# Patient Record
Sex: FEMALE | Race: WHITE | NOT HISPANIC OR LATINO | Employment: FULL TIME | URBAN - METROPOLITAN AREA
[De-identification: names, ages, dates, MRNs, and addresses within clinical notes are randomized per-mention and may not be internally consistent; named-entity substitution may affect disease eponyms.]

---

## 2018-03-19 ENCOUNTER — HOSPITAL ENCOUNTER (INPATIENT)
Facility: HOSPITAL | Age: 28
LOS: 9 days | Discharge: HOME | DRG: 871 | End: 2018-03-28
Attending: EMERGENCY MEDICINE | Admitting: INTERNAL MEDICINE
Payer: COMMERCIAL

## 2018-03-19 ENCOUNTER — APPOINTMENT (EMERGENCY)
Dept: RADIOLOGY | Facility: HOSPITAL | Age: 28
DRG: 871 | End: 2018-03-19
Payer: COMMERCIAL

## 2018-03-19 DIAGNOSIS — E86.0 DEHYDRATION: ICD-10-CM

## 2018-03-19 DIAGNOSIS — N17.9 ACUTE KIDNEY INJURY (HCC): ICD-10-CM

## 2018-03-19 DIAGNOSIS — D50.9 IRON DEFICIENCY ANEMIA, UNSPECIFIED IRON DEFICIENCY ANEMIA TYPE: ICD-10-CM

## 2018-03-19 DIAGNOSIS — J18.9 PNEUMONIA: Primary | ICD-10-CM

## 2018-03-19 DIAGNOSIS — A41.9 SEPSIS (HCC): ICD-10-CM

## 2018-03-19 LAB
ALBUMIN SERPL BCP-MCNC: 2.8 G/DL (ref 3.5–5)
ALP SERPL-CCNC: 60 U/L (ref 46–116)
ALT SERPL W P-5'-P-CCNC: 18 U/L (ref 12–78)
ANION GAP SERPL CALCULATED.3IONS-SCNC: 14 MMOL/L (ref 4–13)
ANISOCYTOSIS BLD QL SMEAR: PRESENT
APTT PPP: 38 SECONDS (ref 23–35)
AST SERPL W P-5'-P-CCNC: 27 U/L (ref 5–45)
BACTERIA UR QL AUTO: ABNORMAL /HPF
BASOPHILS # BLD MANUAL: 0 THOUSAND/UL (ref 0–0.1)
BASOPHILS NFR MAR MANUAL: 0 % (ref 0–1)
BILIRUB SERPL-MCNC: 0.9 MG/DL (ref 0.2–1)
BILIRUB UR QL STRIP: NEGATIVE
BUN SERPL-MCNC: 42 MG/DL (ref 5–25)
CALCIUM SERPL-MCNC: 8.2 MG/DL (ref 8.3–10.1)
CHLORIDE SERPL-SCNC: 98 MMOL/L (ref 100–108)
CLARITY UR: CLEAR
CO2 SERPL-SCNC: 23 MMOL/L (ref 21–32)
COLOR UR: YELLOW
CREAT SERPL-MCNC: 2.02 MG/DL (ref 0.6–1.3)
EOSINOPHIL # BLD MANUAL: 0 THOUSAND/UL (ref 0–0.4)
EOSINOPHIL NFR BLD MANUAL: 0 % (ref 0–6)
ERYTHROCYTE [DISTWIDTH] IN BLOOD BY AUTOMATED COUNT: 15.1 % (ref 11.6–15.1)
EXT PREG TEST URINE: NEGATIVE
GFR SERPL CREATININE-BSD FRML MDRD: 33 ML/MIN/1.73SQ M
GLUCOSE SERPL-MCNC: 125 MG/DL (ref 65–140)
GLUCOSE UR STRIP-MCNC: NEGATIVE MG/DL
HCT VFR BLD AUTO: 30.1 % (ref 34.8–46.1)
HGB BLD-MCNC: 10.3 G/DL (ref 11.5–15.4)
HGB UR QL STRIP.AUTO: ABNORMAL
INR PPP: 1.41 (ref 0.86–1.16)
KETONES UR STRIP-MCNC: NEGATIVE MG/DL
LACTATE SERPL-SCNC: 2 MMOL/L (ref 0.5–2)
LACTATE SERPL-SCNC: 2.8 MMOL/L (ref 0.5–2)
LEUKOCYTE ESTERASE UR QL STRIP: NEGATIVE
LIPASE SERPL-CCNC: 34 U/L (ref 73–393)
LYMPHOCYTES # BLD AUTO: 0.35 THOUSAND/UL (ref 0.6–4.47)
LYMPHOCYTES # BLD AUTO: 3 % (ref 14–44)
MCH RBC QN AUTO: 27 PG (ref 26.8–34.3)
MCHC RBC AUTO-ENTMCNC: 34.2 G/DL (ref 31.4–37.4)
MCV RBC AUTO: 79 FL (ref 82–98)
METAMYELOCYTES NFR BLD MANUAL: 1 % (ref 0–1)
MICROCYTES BLD QL AUTO: PRESENT
MONOCYTES # BLD AUTO: 0.23 THOUSAND/UL (ref 0–1.22)
MONOCYTES NFR BLD: 2 % (ref 4–12)
NEUTROPHILS # BLD MANUAL: 10.87 THOUSAND/UL (ref 1.85–7.62)
NEUTS BAND NFR BLD MANUAL: 22 % (ref 0–8)
NEUTS SEG NFR BLD AUTO: 72 % (ref 43–75)
NITRITE UR QL STRIP: NEGATIVE
NON-SQ EPI CELLS URNS QL MICRO: ABNORMAL /HPF
NRBC BLD AUTO-RTO: 0 /100 WBCS
PH UR STRIP.AUTO: 5.5 [PH] (ref 4.5–8)
PLATELET # BLD AUTO: 183 THOUSANDS/UL (ref 149–390)
PLATELET BLD QL SMEAR: ADEQUATE
PMV BLD AUTO: 11 FL (ref 8.9–12.7)
POTASSIUM SERPL-SCNC: 3.9 MMOL/L (ref 3.5–5.3)
PROT SERPL-MCNC: 6.8 G/DL (ref 6.4–8.2)
PROT UR STRIP-MCNC: ABNORMAL MG/DL
PROTHROMBIN TIME: 17.3 SECONDS (ref 12.1–14.4)
RBC # BLD AUTO: 3.82 MILLION/UL (ref 3.81–5.12)
RBC #/AREA URNS AUTO: ABNORMAL /HPF
SODIUM SERPL-SCNC: 135 MMOL/L (ref 136–145)
SP GR UR STRIP.AUTO: 1.01 (ref 1–1.03)
TOTAL CELLS COUNTED SPEC: 100
UROBILINOGEN UR QL STRIP.AUTO: 0.2 E.U./DL
WBC # BLD AUTO: 11.56 THOUSAND/UL (ref 4.31–10.16)
WBC #/AREA URNS AUTO: ABNORMAL /HPF

## 2018-03-19 PROCEDURE — 99285 EMERGENCY DEPT VISIT HI MDM: CPT

## 2018-03-19 PROCEDURE — 85610 PROTHROMBIN TIME: CPT | Performed by: EMERGENCY MEDICINE

## 2018-03-19 PROCEDURE — 83605 ASSAY OF LACTIC ACID: CPT | Performed by: EMERGENCY MEDICINE

## 2018-03-19 PROCEDURE — 81001 URINALYSIS AUTO W/SCOPE: CPT | Performed by: EMERGENCY MEDICINE

## 2018-03-19 PROCEDURE — 85730 THROMBOPLASTIN TIME PARTIAL: CPT | Performed by: EMERGENCY MEDICINE

## 2018-03-19 PROCEDURE — 80053 COMPREHEN METABOLIC PANEL: CPT | Performed by: EMERGENCY MEDICINE

## 2018-03-19 PROCEDURE — 83690 ASSAY OF LIPASE: CPT | Performed by: EMERGENCY MEDICINE

## 2018-03-19 PROCEDURE — 36415 COLL VENOUS BLD VENIPUNCTURE: CPT | Performed by: EMERGENCY MEDICINE

## 2018-03-19 PROCEDURE — 96375 TX/PRO/DX INJ NEW DRUG ADDON: CPT

## 2018-03-19 PROCEDURE — 87798 DETECT AGENT NOS DNA AMP: CPT | Performed by: EMERGENCY MEDICINE

## 2018-03-19 PROCEDURE — 87040 BLOOD CULTURE FOR BACTERIA: CPT | Performed by: EMERGENCY MEDICINE

## 2018-03-19 PROCEDURE — 96365 THER/PROPH/DIAG IV INF INIT: CPT

## 2018-03-19 PROCEDURE — 85007 BL SMEAR W/DIFF WBC COUNT: CPT | Performed by: EMERGENCY MEDICINE

## 2018-03-19 PROCEDURE — 71046 X-RAY EXAM CHEST 2 VIEWS: CPT

## 2018-03-19 PROCEDURE — 96361 HYDRATE IV INFUSION ADD-ON: CPT

## 2018-03-19 PROCEDURE — 99223 1ST HOSP IP/OBS HIGH 75: CPT | Performed by: PHYSICIAN ASSISTANT

## 2018-03-19 PROCEDURE — 85027 COMPLETE CBC AUTOMATED: CPT | Performed by: EMERGENCY MEDICINE

## 2018-03-19 PROCEDURE — 93005 ELECTROCARDIOGRAM TRACING: CPT

## 2018-03-19 PROCEDURE — 81025 URINE PREGNANCY TEST: CPT | Performed by: EMERGENCY MEDICINE

## 2018-03-19 RX ORDER — ALBUTEROL SULFATE 2.5 MG/3ML
2.5 SOLUTION RESPIRATORY (INHALATION) EVERY 6 HOURS PRN
Status: DISCONTINUED | OUTPATIENT
Start: 2018-03-19 | End: 2018-03-28 | Stop reason: HOSPADM

## 2018-03-19 RX ORDER — ONDANSETRON 2 MG/ML
4 INJECTION INTRAMUSCULAR; INTRAVENOUS ONCE
Status: COMPLETED | OUTPATIENT
Start: 2018-03-19 | End: 2018-03-19

## 2018-03-19 RX ORDER — ALBUTEROL SULFATE 90 UG/1
2 AEROSOL, METERED RESPIRATORY (INHALATION) EVERY 4 HOURS PRN
Status: DISCONTINUED | OUTPATIENT
Start: 2018-03-19 | End: 2018-03-28 | Stop reason: HOSPADM

## 2018-03-19 RX ORDER — ONDANSETRON 2 MG/ML
4 INJECTION INTRAMUSCULAR; INTRAVENOUS EVERY 6 HOURS PRN
Status: DISCONTINUED | OUTPATIENT
Start: 2018-03-19 | End: 2018-03-20

## 2018-03-19 RX ORDER — ACETAMINOPHEN 325 MG/1
650 TABLET ORAL EVERY 6 HOURS PRN
Status: DISCONTINUED | OUTPATIENT
Start: 2018-03-19 | End: 2018-03-28 | Stop reason: HOSPADM

## 2018-03-19 RX ORDER — ACETAMINOPHEN 325 MG/1
650 TABLET ORAL ONCE
Status: COMPLETED | OUTPATIENT
Start: 2018-03-19 | End: 2018-03-19

## 2018-03-19 RX ORDER — SODIUM CHLORIDE 9 MG/ML
75 INJECTION, SOLUTION INTRAVENOUS CONTINUOUS
Status: DISCONTINUED | OUTPATIENT
Start: 2018-03-19 | End: 2018-03-23

## 2018-03-19 RX ORDER — GUAIFENESIN 600 MG
600 TABLET, EXTENDED RELEASE 12 HR ORAL EVERY 12 HOURS SCHEDULED
Status: DISCONTINUED | OUTPATIENT
Start: 2018-03-19 | End: 2018-03-28 | Stop reason: HOSPADM

## 2018-03-19 RX ADMIN — SODIUM CHLORIDE 1000 ML: 0.9 INJECTION, SOLUTION INTRAVENOUS at 20:17

## 2018-03-19 RX ADMIN — ACETAMINOPHEN 650 MG: 325 TABLET, FILM COATED ORAL at 17:23

## 2018-03-19 RX ADMIN — SODIUM CHLORIDE 125 ML/HR: 0.9 INJECTION, SOLUTION INTRAVENOUS at 20:17

## 2018-03-19 RX ADMIN — CEFEPIME HYDROCHLORIDE 2000 MG: 2 INJECTION, POWDER, FOR SOLUTION INTRAVENOUS at 18:02

## 2018-03-19 RX ADMIN — ONDANSETRON 4 MG: 2 INJECTION INTRAMUSCULAR; INTRAVENOUS at 17:09

## 2018-03-19 RX ADMIN — GUAIFENESIN 600 MG: 600 TABLET, EXTENDED RELEASE ORAL at 22:26

## 2018-03-19 RX ADMIN — SODIUM CHLORIDE 200 ML: 0.9 INJECTION, SOLUTION INTRAVENOUS at 18:50

## 2018-03-19 RX ADMIN — SODIUM CHLORIDE 2000 ML: 0.9 INJECTION, SOLUTION INTRAVENOUS at 17:09

## 2018-03-19 NOTE — ED PROVIDER NOTES
History  Chief Complaint   Patient presents with    Vomiting     Patient reports vomting, diarrhea, and fever for two days  reports seen by PCP and reffered to ED   Shortness of Breath     Patient reports shortness of breath and dry cough which began on saturday  HPI    19-year-old female with past medical history of depression presents with chief complaint of shortness of breath which began Saturday  Patient admits to a mildly productive cough yellow sputum  Patient has had accompanying nausea and vomiting  Patient has vomited 3-4 times each day since Sunday  The vomitus is nonbloody nonbilious food like material   Patient admits to fevers at home has not taken temperature she admits to chills rigors  Patient feels unwell  Patient presented to PCPs office and was found to be mildly hypoxic down to 91% and an abnormal lung exam concerning for pneumonia  Patient has been persistent felt extreme fatigue  Patient does not smoke  Patient tried TheraFlu Patient denie but she could not keep this down  Denies prior history of thromboembolism currently is not on birth control  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, working in a office  Patient no recent surgeries  Patient denies neck pain neck stiffness chest pain palpitations hemoptysis pleurisy abdominal pain diarrhea constipation urinary symptoms motor weakness numbness and tingling  None       Past Medical History:   Diagnosis Date    Depressed        Past Surgical History:   Procedure Laterality Date    BREAST LUMPECTOMY Right        History reviewed  No pertinent family history  I have reviewed and agree with the history as documented  Social History   Substance Use Topics    Smoking status: Never Smoker    Smokeless tobacco: Never Used    Alcohol use Yes      Comment: social        Review of Systems   Constitutional: Positive for chills, fatigue and fever   Negative for activity change, appetite change, diaphoresis and unexpected weight change  HENT: Negative for congestion, ear discharge, ear pain, facial swelling, hearing loss, nosebleeds, postnasal drip, rhinorrhea, sinus pressure, sneezing, sore throat and tinnitus  Eyes: Negative for photophobia, pain, redness, itching and visual disturbance  Respiratory: Positive for cough and shortness of breath  Negative for chest tightness, wheezing and stridor  Cardiovascular: Negative for chest pain, palpitations and leg swelling  Gastrointestinal: Positive for diarrhea, nausea and vomiting  Negative for abdominal distention, abdominal pain, anal bleeding, blood in stool and constipation  Endocrine: Negative for polydipsia, polyphagia and polyuria  Genitourinary: Negative for decreased urine volume, difficulty urinating, dysuria, enuresis, flank pain, frequency, hematuria, menstrual problem, urgency, vaginal bleeding, vaginal discharge and vaginal pain  Musculoskeletal: Positive for arthralgias and myalgias  Negative for back pain, gait problem, joint swelling, neck pain and neck stiffness  Skin: Negative for rash and wound  Allergic/Immunologic: Negative for environmental allergies, food allergies and immunocompromised state  Neurological: Negative for dizziness, tremors, seizures, syncope, facial asymmetry, speech difficulty, weakness, light-headedness, numbness and headaches  Hematological: Negative for adenopathy  Psychiatric/Behavioral: Negative for agitation, behavioral problems, confusion, dysphoric mood, hallucinations, self-injury, sleep disturbance and suicidal ideas  The patient is not nervous/anxious and is not hyperactive          Physical Exam  ED Triage Vitals [03/19/18 1613]   Temperature Pulse Respirations Blood Pressure SpO2   99 1 °F (37 3 °C) (!) 132 20 139/57 96 %      Temp Source Heart Rate Source Patient Position - Orthostatic VS BP Location FiO2 (%)   Oral Monitor Sitting Right arm --      Pain Score       7           Orthostatic Vital Signs  Vitals:    03/19/18 2145 03/19/18 2359 03/20/18 0820 03/20/18 1521   BP: 116/79 110/60 100/52    Pulse: (!) 108 (!) 110 (!) 106 (!) 107   Patient Position - Orthostatic VS: Lying Lying Lying Lying       Physical Exam   Constitutional: She is oriented to person, place, and time  She appears well-developed and well-nourished  She appears ill  No distress  HENT:   Head: Normocephalic and atraumatic  Right Ear: External ear normal    Left Ear: External ear normal    Nose: Nose normal    Mouth/Throat: Oropharynx is clear and moist  Mucous membranes are dry  No oropharyngeal exudate  Eyes: Conjunctivae and EOM are normal  Pupils are equal, round, and reactive to light  Right eye exhibits no discharge  Left eye exhibits no discharge  No scleral icterus  Neck: Trachea normal, normal range of motion, full passive range of motion without pain and phonation normal  Neck supple  No JVD present  No spinous process tenderness and no muscular tenderness present  No neck rigidity  No tracheal deviation, no edema, no erythema and normal range of motion present  No thyromegaly present  Cardiovascular: Normal rate, regular rhythm, normal heart sounds and intact distal pulses  No murmur heard  Pulses:       Carotid pulses are 2+ on the right side, and 2+ on the left side  Dorsalis pedis pulses are 2+ on the right side, and 2+ on the left side  Posterior tibial pulses are 2+ on the right side, and 2+ on the left side  Pulmonary/Chest: Effort normal  No stridor  No respiratory distress  She has decreased breath sounds in the right lower field and the left lower field  She has no wheezes  She has no rhonchi  She has no rales  Abdominal: Soft  Bowel sounds are normal  She exhibits no distension and no mass  There is no tenderness  There is no rigidity, no rebound, no guarding, no CVA tenderness, no tenderness at McBurney's point and negative Greer's sign  No hernia     Musculoskeletal: Normal range of motion  She exhibits no edema, tenderness or deformity  Lymphadenopathy:     She has no cervical adenopathy  Neurological: She is alert and oriented to person, place, and time  She displays normal reflexes  No cranial nerve deficit  She exhibits normal muscle tone  Skin: Skin is warm and dry  No rash noted  She is not diaphoretic  No erythema  Psychiatric: She has a normal mood and affect  Her behavior is normal  Judgment and thought content normal    Nursing note and vitals reviewed        ED Medications  Medications   sodium chloride 0 9 % infusion (125 mL/hr Intravenous New Bag 3/20/18 1235)   ondansetron (ZOFRAN) injection 4 mg (4 mg Intravenous Given 3/20/18 1742)   cefepime (MAXIPIME) 2 g/50 mL dextrose IVPB (2,000 mg Intravenous New Bag 3/20/18 1738)   acetaminophen (TYLENOL) tablet 650 mg (650 mg Oral Given 3/20/18 1842)   guaiFENesin (MUCINEX) 12 hr tablet 600 mg (600 mg Oral Given 3/20/18 0949)   albuterol inhalation solution 2 5 mg (2 5 mg Nebulization Given 3/20/18 1640)   albuterol (PROVENTIL HFA,VENTOLIN HFA) inhaler 2 puff (2 puffs Inhalation Given 3/20/18 1738)   ferrous sulfate tablet 325 mg (not administered)   sodium chloride 0 9 % bolus 2,000 mL (0 mL Intravenous Stopped 3/19/18 1850)   acetaminophen (TYLENOL) tablet 650 mg (650 mg Oral Given 3/19/18 1723)   ondansetron (ZOFRAN) injection 4 mg (4 mg Intravenous Given 3/19/18 1709)   cefepime (MAXIPIME) 2 g/50 mL dextrose IVPB (0 mg Intravenous Stopped 3/19/18 1844)   sodium chloride 0 9 % bolus 200 mL (0 mL Intravenous Stopped 3/19/18 1925)   sodium chloride 0 9 % bolus 1,000 mL (0 mL Intravenous Stopped 3/20/18 0726)       Diagnostic Studies  Results Reviewed     Procedure Component Value Units Date/Time    Influenza A/B and RSV by PCR (indicated for patients >2 mo of age) [[id number]]  (Normal) Collected:  03/19/18 1849    Lab Status:  Final result Specimen:  Nasopharyngeal from Nasopharyngeal Swab Updated:  03/20/18 1015     INFLU A PCR None Detected     INFLU B PCR None Detected     RSV PCR None Detected    Basic metabolic panel [[id number]]  (Abnormal) Collected:  03/20/18 0545    Lab Status:  Final result Specimen:  Blood from Arm, Left Updated:  03/20/18 0756     Sodium 140 mmol/L      Potassium 3 5 mmol/L      Chloride 108 mmol/L      CO2 20 (L) mmol/L      Anion Gap 12 mmol/L      BUN 27 (H) mg/dL      Creatinine 1 34 (H) mg/dL      Glucose 112 mg/dL      Calcium 8 0 (L) mg/dL      eGFR 54 ml/min/1 73sq m     Narrative:         National Kidney Disease Education Program recommendations are as follows:  GFR calculation is accurate only with a steady state creatinine  Chronic Kidney disease less than 60 ml/min/1 73 sq  meters  Kidney failure less than 15 ml/min/1 73 sq  meters  APTT [[id number]]  (Abnormal) Collected:  03/20/18 0545    Lab Status:  Final result Specimen:  Blood from Arm, Left Updated:  03/20/18 0642     PTT 41 (H) seconds     Narrative: Therapeutic Heparin Range = 60-90 seconds    Protime-INR [[id number]]  (Abnormal) Collected:  03/20/18 0545    Lab Status:  Final result Specimen:  Blood from Arm, Left Updated:  03/20/18 2927     Protime 17 0 (H) seconds      INR 1 38 (H)    CBC (With Platelets) [[id number]]  (Abnormal) Collected:  03/20/18 0545    Lab Status:  Final result Specimen:  Blood from Arm, Left Updated:  03/20/18 0638     WBC 8 65 Thousand/uL      RBC 3 41 (L) Million/uL      Hemoglobin 9 2 (L) g/dL      Hematocrit 27 3 (L) %      MCV 80 (L) fL      MCH 27 0 pg      MCHC 33 7 g/dL      RDW 15 6 (H) %      Platelets 384 Thousands/uL      MPV 11 3 fL     Lactic acid x2 Q2H [[id number]]  (Abnormal) Collected:  03/19/18 1903    Lab Status:  Final result Specimen:  Blood from Arm, Right Updated:  03/19/18 1930     LACTIC ACID 2 8 (HH) mmol/L     Narrative:         Result may be elevated if tourniquet was used during collection      Urine Microscopic [[id number]]  (Abnormal) Collected:  03/19/18 1903    Lab Status:  Final result Specimen:  Urine from Urine, Clean Catch Updated:  03/19/18 1919     RBC, UA 2-4 (A) /hpf      WBC, UA None Seen /hpf      Epithelial Cells Occasional /hpf      Bacteria, UA Occasional /hpf     POCT pregnancy, urine [[id number]]  (Normal) Resulted:  03/19/18 1904    Lab Status:  Final result Updated:  03/19/18 1918     EXT PREG TEST UR (Ref: Negative) NEGATIVE    UA w Reflex to Microscopic w Reflex to Culture [[id number]]  (Abnormal) Collected:  03/19/18 1903    Lab Status:  Final result Specimen:  Urine from Urine, Clean Catch Updated:  03/19/18 1912     Color, UA Yellow     Clarity, UA Clear     Specific Gravity, UA 1 015     pH, UA 5 5     Leukocytes, UA Negative     Nitrite, UA Negative     Protein, UA 30 (1+) (A) mg/dl      Glucose, UA Negative mg/dl      Ketones, UA Negative mg/dl      Urobilinogen, UA 0 2 E U /dl      Bilirubin, UA Negative     Blood, UA Small (A)    Lactic acid x2 Q2H [[id number]]     Lab Status:  No result Specimen:  Blood     CBC and differential [[id number]]  (Abnormal) Collected:  03/19/18 1705    Lab Status:  Final result Specimen:  Blood from Arm, Right Updated:  03/19/18 1753     WBC 11 56 (H) Thousand/uL      RBC 3 82 Million/uL      Hemoglobin 10 3 (L) g/dL      Hematocrit 30 1 (L) %      MCV 79 (L) fL      MCH 27 0 pg      MCHC 34 2 g/dL      RDW 15 1 %      MPV 11 0 fL      Platelets 191 Thousands/uL      nRBC 0 /100 WBCs     Narrative: This is an appended report  These results have been appended to a previously verified report  Lactic Acid x2 [[id number]]  (Normal) Collected:  03/19/18 1705    Lab Status:  Final result Specimen:  Blood from Arm, Right Updated:  03/19/18 1743     LACTIC ACID 2 0 mmol/L     Narrative:         Result may be elevated if tourniquet was used during collection      Comprehensive metabolic panel [[id number]]  (Abnormal) Collected:  03/19/18 1705    Lab Status:  Final result Specimen:  Blood from Arm, Right Updated:  03/19/18 1731     Sodium 135 (L) mmol/L      Potassium 3 9 mmol/L      Chloride 98 (L) mmol/L      CO2 23 mmol/L      Anion Gap 14 (H) mmol/L      BUN 42 (H) mg/dL      Creatinine 2 02 (H) mg/dL      Glucose 125 mg/dL      Calcium 8 2 (L) mg/dL      AST 27 U/L      ALT 18 U/L      Alkaline Phosphatase 60 U/L      Total Protein 6 8 g/dL      Albumin 2 8 (L) g/dL      Total Bilirubin 0 90 mg/dL      eGFR 33 ml/min/1 73sq m     Narrative:         National Kidney Disease Education Program recommendations are as follows:  GFR calculation is accurate only with a steady state creatinine  Chronic Kidney disease less than 60 ml/min/1 73 sq  meters  Kidney failure less than 15 ml/min/1 73 sq  meters  Lipase [[id number]]  (Abnormal) Collected:  03/19/18 1705    Lab Status:  Final result Specimen:  Blood from Arm, Right Updated:  03/19/18 1731     Lipase 34 (L) u/L     Protime-INR [[id number]]  (Abnormal) Collected:  03/19/18 1705    Lab Status:  Final result Specimen:  Blood from Arm, Right Updated:  03/19/18 1729     Protime 17 3 (H) seconds      INR 1 41 (H)    APTT [[id number]]  (Abnormal) Collected:  03/19/18 1705    Lab Status:  Final result Specimen:  Blood from Arm, Right Updated:  03/19/18 1729     PTT 38 (H) seconds     Narrative: Therapeutic Heparin Range = 60-90 seconds    Blood culture #2 [[id number]] Collected:  03/19/18 1705    Lab Status: In process Specimen:  Blood from Arm, Right Updated:  03/19/18 1709    Blood culture #1 [[id number]] Collected:  03/19/18 1705    Lab Status: In process Specimen:  Blood from Arm, Left Updated:  03/19/18 1709                 XR chest 2 views   ED Interpretation by Alek Villareal DO (03/19 1758)   Right lower lobe consolidation      Final Result by Sadie Francois MD (03/20 0124)      Right middle and lower lobe opacification compatible with pneumonia              Workstation performed: [id number]               Procedures  ECG 12 Lead Documentation  Date/Time: 3/19/2018 5:45 PM  Performed by: Carol Hale LESTER  Authorized by: Sandy Degroot     Indications / Diagnosis:  Tachycardia  ECG reviewed by me, the ED Provider: yes    Patient location:  ED  Previous ECG:     Previous ECG:  Compared to current    Similarity:  Changes noted  Interpretation:     Interpretation: normal    Rate:     ECG rate:  121    ECG rate assessment: normal    Rhythm:     Rhythm: sinus tachycardia    Ectopy:     Ectopy: none    QRS:     QRS axis:  Normal  Conduction:     Conduction: normal    ST segments:     ST segments:  Normal  T waves:     T waves: normal    Comments:      Sinus tachycardia ventricular rate has increased by 58 bpm          Phone Consults  ED Phone Contact    ED Course  ED Course                          Initial Sepsis Screening     [address] Name 03/19/18 1816                Is the patient's history suggestive of a new or worsening infection? (!)  Yes (Proceed)  -TD        Suspected source of infection pneumonia  -TD        Are two or more of the following signs & symptoms of infection both present and new to the patient?         Indicate SIRS criteria Tachycardia > 90 bpm;WBC > 10% bands  -TD        If the answer is yes to both questions, suspicion of sepsis is present          If severe sepsis is present AND tissue hypoperfusion perists in the hour after fluid resuscitation or lactate > 4, the patient meets criteria for SEPTIC SHOCK          Are any of the following organ dysfunction criteria present within 6 hours of suspected infection and SIRS criteria that are NOT considered to be chronic conditions?  (!)  Yes  -TD        Organ dysfunction Creatinine > 2 0 mg/dL  -TD        Date of presentation of severe sepsis 03/19/18  -TD        Time of presentation of severe sepsis 1817  -TD        Tissue hypoperfusion persists in the hour after crystalloid fluid administration, evidenced, by either: SBP < 90 mm/Hg ( ___ mm/Hg in comment field)  -TD        Was hypotension present within one hour of the conclusion of crystalloid fluid administration? Yes  -TD        Date of presentation of septic shock 03/19/18  -TD        Time of presentation of septic shock 1818  -TD          User Key  (r) = Recorded By, (t) = Taken By, (c) = Cosigned By    [address] Name Provider Type    DO Nicole Lucas           Default Flowsheet Data (last 720 hours)      Sepsis Reassessment     Row Name 03/19/18 0054                   Volume Status and Tissue Perfusion Post Fluid Resuscitation- Must Document ALL of the Following:    Vital Signs Reviewed Yes  -TD        Cardio Normal S1/S2; Regular rate and rhythm  -TD        Pulmonary Normal effort;Clear to auscultation  -TD        Capillary Refill Brisk  -TD        Peripheral Pulses Radial;Dorsalis Pedis; Posterior Tibialis  -TD        Peripheral Pulse +2  -TD        Dorsalis Pedis +2  -TD        Posterior Tibialis +2  -TD        Skin Warm  -TD           *OR*   Intensive Monitoring- Must Document Two * of the Following Four *:    Vital Signs Reviewed          * Central Venous Pressure (CVP or RAP)          * Central Venous Oxygen (SVO2, ScvO2 or Oxygen saturation via central catheter)          * Bedside Cardiovascular US in IVC diameter and % collapse          * Passive Leg Raise OR Crystalloid Challenge            User Key  (r) = Recorded By, (t) = Taken By, (c) = Cosigned By    Initials Name Provider Type    MONICA Adhikari DO Resident                MDM  Number of Diagnoses or Management Options  Diagnosis management comments: 22-year-old female presenting with chief complaint fatigue fever chills and a nonproductive cough with accompanying nausea vomiting  On exam patient is tachycardic, afebrile  Patient appears clinically dehydrated with dry laps  Patient appears ill but nontoxic  Decreased breath sounds at bilateral lobes  Nontender abdomen  Chest x-ray showed a right lower lobe middle lobe pneumonia  CBC significant for leukocytosis, bandemia, sirs criteria met    Initial lactate 2 0   Creatinine over 2, severe sepsis met  After fluids patient's blood pressure systolic BP 89, septic shock present, sepsis alert called  Rocephin started as mono agent therapy  30 cc/kilos met curve from this with nursing  Repeat lactate 2 8  Additional fluids given  Patient given aggressive fluid rehydration, patient reassessed multiple times, patient's blood pressure was stable at  systolic early  Patient felt better  Patient's urine output began to improve, patient urinated multiple times in the ED  Flu swab negative  Patient admitted to [facility] Internal Medicine service after Critical Care evaluate patient in the ED  Patient admitted to [facility] Internal Medicine service for sepsis pneumonia source with a KI  Family and patient updated on care plan  CritCare Time    Disposition  Final diagnoses:   None     ED Disposition     None      Follow-up Information    None       There are no discharge medications for this patient  No discharge procedures on file  ED Provider  Attending physically available and evaluated Sanjeev Luna I managed the patient along with the ED Attending      Electronically Signed by         Darin Oliva DO  03/20/18 3290

## 2018-03-19 NOTE — SEPSIS NOTE
Sepsis Note   Ivana Chavez 29 y o  female MRN: [id number]  Unit/Bed#: ED 15 Encounter: [id number]            Initial Sepsis Screening     Row Name 03/19/18 1816                Is the patient's history suggestive of a new or worsening infection? (!)  Yes (Proceed)  -TD        Suspected source of infection pneumonia  -TD        Are two or more of the following signs & symptoms of infection both present and new to the patient?         Indicate SIRS criteria Tachycardia > 90 bpm;WBC > 10% bands  -TD        If the answer is yes to both questions, suspicion of sepsis is present          If severe sepsis is present AND tissue hypoperfusion perists in the hour after fluid resuscitation or lactate > 4, the patient meets criteria for SEPTIC SHOCK          Are any of the following organ dysfunction criteria present within 6 hours of suspected infection and SIRS criteria that are NOT considered to be chronic conditions? (!)  Yes  -TD        Organ dysfunction Creatinine > 2 0 mg/dL  -TD        Date of presentation of severe sepsis 03/19/18  -TD        Time of presentation of severe sepsis 1817  -TD        Tissue hypoperfusion persists in the hour after crystalloid fluid administration, evidenced, by either: SBP < 90 mm/Hg ( ___ mm/Hg in comment field)  -TD        Was hypotension present within one hour of the conclusion of crystalloid fluid administration? Yes  -TD        Date of presentation of septic shock 03/19/18  -TD        Time of presentation of septic shock 1818  -TD          User Key  (r) = Recorded By, (t) = Taken By, (c) = Cosigned By    [address] Name Provider Type    TD London Boateng DO Resident               Default Flowsheet Data (last 720 hours)      Sepsis Reassessment     Row Name 03/19/18 1855                   Volume Status and Tissue Perfusion Post Fluid Resuscitation- Must Document ALL of the Following:    Vital Signs Reviewed Yes  -TD        Cardio Normal S1/S2; Regular rate and rhythm  -TD Pulmonary Normal effort;Clear to auscultation  -TD        Capillary Refill Brisk  -TD        Peripheral Pulses Radial;Dorsalis Pedis; Posterior Tibialis  -TD        Peripheral Pulse +2  -TD        Dorsalis Pedis +2  -TD        Posterior Tibialis +2  -TD        Skin Warm  -TD           *OR*   Intensive Monitoring- Must Document Two * of the Following Four *:    Vital Signs Reviewed          * Central Venous Pressure (CVP or RAP)          * Central Venous Oxygen (SVO2, ScvO2 or Oxygen saturation via central catheter)          * Bedside Cardiovascular US in IVC diameter and % collapse          * Passive Leg Raise OR Crystalloid Challenge            User Key  (r) = Recorded By, (t) = Taken By, (c) = Cosigned By    Initials Name Provider Type    TD Marguerite Marinellir, DO Resident

## 2018-03-19 NOTE — ED ATTENDING ATTESTATION
Edson Medina MD, saw and evaluated the patient  I have discussed the patient with the resident/non-physician practitioner and agree with the resident's/non-physician practitioner's findings, Plan of Care, and MDM as documented in the resident's/non-physician practitioner's note, except where noted  All available labs and Radiology studies were reviewed  At this point I agree with the current assessment done in the Emergency Department  I have conducted an independent evaluation of this patient a history and physical is as follows:    26-year-old female presents for evaluation of right flank pain, fever, cough, diarrhea without blood or mucus shortness of breath x2 days  Patient denies chest pain, abdominal pain, proceeding viral prodrome, dysuria, urinary frequency, nausea, vomiting, anorexia  Ten systems reviewed otherwise negative  On exam no acute distress, l HEENT normal, lungs normal, cardiac tachycardic, regular no murmurs rubs or gallops, abdomen soft nontender nondistended, right CVA tenderness   Medical decision making;-will do septic workup, IV fluids, treat symptoms, reassess    Critical Care Time  CritCare Time    Procedures

## 2018-03-19 NOTE — ED NOTES
Dr Dena Arreguin at [facility], [facility], [facility]  03/19/18 1961

## 2018-03-19 NOTE — ED PROCEDURE NOTE
PROCEDURE  CriticalCare Time  Performed by: Jacqui Cooney by: Sandy Degroot     Critical care provider statement:     Critical care time (minutes):  35    Critical care time was exclusive of:  Separately billable procedures and treating other patients and teaching time    Critical care was necessary to treat or prevent imminent or life-threatening deterioration of the following conditions:  Sepsis    Critical care was time spent personally by me on the following activities:  Blood draw for specimens, obtaining history from patient or surrogate, development of treatment plan with patient or surrogate, discussions with consultants, evaluation of patient's response to treatment, examination of patient, interpretation of cardiac output measurements, ordering and performing treatments and interventions, ordering and review of laboratory studies, ordering and review of radiographic studies, re-evaluation of patient's condition and review of christy Masters MD  03/19/18 4626

## 2018-03-19 NOTE — ED NOTES
Dr Maximilian Long made aware of patients most recent blood pressure     Tory Hung RN  03/19/18 8183

## 2018-03-20 PROBLEM — N17.9 ACUTE KIDNEY INJURY (HCC): Status: ACTIVE | Noted: 2018-03-20

## 2018-03-20 PROBLEM — D50.9 IRON DEFICIENCY ANEMIA: Status: ACTIVE | Noted: 2018-03-20

## 2018-03-20 PROBLEM — E86.0 DEHYDRATION: Status: ACTIVE | Noted: 2018-03-20

## 2018-03-20 LAB
ANION GAP SERPL CALCULATED.3IONS-SCNC: 12 MMOL/L (ref 4–13)
APTT PPP: 41 SECONDS (ref 23–35)
ATRIAL RATE: 121 BPM
BUN SERPL-MCNC: 27 MG/DL (ref 5–25)
CALCIUM SERPL-MCNC: 8 MG/DL (ref 8.3–10.1)
CHLORIDE SERPL-SCNC: 108 MMOL/L (ref 100–108)
CO2 SERPL-SCNC: 20 MMOL/L (ref 21–32)
CREAT SERPL-MCNC: 1.34 MG/DL (ref 0.6–1.3)
ERYTHROCYTE [DISTWIDTH] IN BLOOD BY AUTOMATED COUNT: 15.6 % (ref 11.6–15.1)
FLUAV AG SPEC QL: NORMAL
FLUBV AG SPEC QL: NORMAL
GFR SERPL CREATININE-BSD FRML MDRD: 54 ML/MIN/1.73SQ M
GLUCOSE SERPL-MCNC: 112 MG/DL (ref 65–140)
HCT VFR BLD AUTO: 27.3 % (ref 34.8–46.1)
HGB BLD-MCNC: 9.2 G/DL (ref 11.5–15.4)
INR PPP: 1.38 (ref 0.86–1.16)
L PNEUMO1 AG UR QL IA.RAPID: NEGATIVE
LACTATE SERPL-SCNC: 2 MMOL/L (ref 0.5–2)
MCH RBC QN AUTO: 27 PG (ref 26.8–34.3)
MCHC RBC AUTO-ENTMCNC: 33.7 G/DL (ref 31.4–37.4)
MCV RBC AUTO: 80 FL (ref 82–98)
P AXIS: 65 DEGREES
PLATELET # BLD AUTO: 191 THOUSANDS/UL (ref 149–390)
PMV BLD AUTO: 11.3 FL (ref 8.9–12.7)
POTASSIUM SERPL-SCNC: 3.5 MMOL/L (ref 3.5–5.3)
PR INTERVAL: 136 MS
PROTHROMBIN TIME: 17 SECONDS (ref 12.1–14.4)
QRS AXIS: 75 DEGREES
QRSD INTERVAL: 72 MS
QT INTERVAL: 290 MS
QTC INTERVAL: 411 MS
RBC # BLD AUTO: 3.41 MILLION/UL (ref 3.81–5.12)
RSV B RNA SPEC QL NAA+PROBE: NORMAL
S PNEUM AG UR QL: NEGATIVE
SODIUM SERPL-SCNC: 140 MMOL/L (ref 136–145)
T WAVE AXIS: 42 DEGREES
VENTRICULAR RATE: 121 BPM
WBC # BLD AUTO: 8.65 THOUSAND/UL (ref 4.31–10.16)

## 2018-03-20 PROCEDURE — 93010 ELECTROCARDIOGRAM REPORT: CPT | Performed by: INTERNAL MEDICINE

## 2018-03-20 PROCEDURE — 83605 ASSAY OF LACTIC ACID: CPT | Performed by: INTERNAL MEDICINE

## 2018-03-20 PROCEDURE — 99232 SBSQ HOSP IP/OBS MODERATE 35: CPT | Performed by: INTERNAL MEDICINE

## 2018-03-20 PROCEDURE — 85730 THROMBOPLASTIN TIME PARTIAL: CPT | Performed by: PHYSICIAN ASSISTANT

## 2018-03-20 PROCEDURE — 94760 N-INVAS EAR/PLS OXIMETRY 1: CPT

## 2018-03-20 PROCEDURE — 85610 PROTHROMBIN TIME: CPT | Performed by: PHYSICIAN ASSISTANT

## 2018-03-20 PROCEDURE — 94640 AIRWAY INHALATION TREATMENT: CPT

## 2018-03-20 PROCEDURE — 87449 NOS EACH ORGANISM AG IA: CPT | Performed by: INTERNAL MEDICINE

## 2018-03-20 PROCEDURE — 85027 COMPLETE CBC AUTOMATED: CPT | Performed by: PHYSICIAN ASSISTANT

## 2018-03-20 PROCEDURE — 80048 BASIC METABOLIC PNL TOTAL CA: CPT | Performed by: PHYSICIAN ASSISTANT

## 2018-03-20 PROCEDURE — 94762 N-INVAS EAR/PLS OXIMTRY CONT: CPT

## 2018-03-20 RX ORDER — FERROUS SULFATE 325(65) MG
325 TABLET ORAL
Status: DISCONTINUED | OUTPATIENT
Start: 2018-03-21 | End: 2018-03-28 | Stop reason: HOSPADM

## 2018-03-20 RX ADMIN — ACETAMINOPHEN 650 MG: 325 TABLET, FILM COATED ORAL at 00:56

## 2018-03-20 RX ADMIN — ONDANSETRON 8 MG: 2 INJECTION INTRAMUSCULAR; INTRAVENOUS at 21:36

## 2018-03-20 RX ADMIN — ONDANSETRON 4 MG: 2 INJECTION INTRAMUSCULAR; INTRAVENOUS at 09:56

## 2018-03-20 RX ADMIN — GUAIFENESIN 600 MG: 600 TABLET, EXTENDED RELEASE ORAL at 09:49

## 2018-03-20 RX ADMIN — ALBUTEROL SULFATE 2 PUFF: 90 AEROSOL, METERED RESPIRATORY (INHALATION) at 17:38

## 2018-03-20 RX ADMIN — ACETAMINOPHEN 650 MG: 325 TABLET, FILM COATED ORAL at 09:49

## 2018-03-20 RX ADMIN — CEFEPIME 2000 MG: 2 INJECTION, POWDER, FOR SOLUTION INTRAMUSCULAR; INTRAVENOUS at 05:33

## 2018-03-20 RX ADMIN — ALBUTEROL SULFATE 2.5 MG: 2.5 SOLUTION RESPIRATORY (INHALATION) at 16:40

## 2018-03-20 RX ADMIN — ONDANSETRON 4 MG: 2 INJECTION INTRAMUSCULAR; INTRAVENOUS at 17:42

## 2018-03-20 RX ADMIN — ACETAMINOPHEN 650 MG: 325 TABLET, FILM COATED ORAL at 18:42

## 2018-03-20 RX ADMIN — GUAIFENESIN 600 MG: 600 TABLET, EXTENDED RELEASE ORAL at 21:11

## 2018-03-20 RX ADMIN — ALBUTEROL SULFATE 2 PUFF: 90 AEROSOL, METERED RESPIRATORY (INHALATION) at 09:45

## 2018-03-20 RX ADMIN — SODIUM CHLORIDE 125 ML/HR: 0.9 INJECTION, SOLUTION INTRAVENOUS at 21:10

## 2018-03-20 RX ADMIN — CEFEPIME 2000 MG: 2 INJECTION, POWDER, FOR SOLUTION INTRAMUSCULAR; INTRAVENOUS at 17:38

## 2018-03-20 RX ADMIN — SODIUM CHLORIDE 125 ML/HR: 0.9 INJECTION, SOLUTION INTRAVENOUS at 12:35

## 2018-03-20 NOTE — NURSING NOTE
Pt was sating with only room air ok, until falling into deep sleep  She stated she was having "shortness of breath"  Her pulse ox was 88%  Place pt on 2 then 3 liters of oxygen via nasal canula but still sating at only 89-91%  Called attending SLIM on call, a respiratory protocol was put in and the attending SLIM mentioned calling respiratory for a breathing treatment, respiratory stated to increase the pt oxygen, which was then bumped up to 5 via nasal cannula the pt is now sating at 93-94%  Will continue to monitor

## 2018-03-20 NOTE — ASSESSMENT & PLAN NOTE
Present on admission  Most likely secondary to sepsis, dehydration  Creatinine trending down today at 1 34  Aggressive IV fluids to continue

## 2018-03-20 NOTE — PROGRESS NOTES
Patient parents wanted to ask about daughters progress and felt as though she was not getting the care that she needed  Patient C/O being dehydrated even though she is drinking PO fluids and has IVF running at 125mL/hr  Provided information regarding the results of her WBC decreased from when she was admitted and informed that the IV ABX are working  Paged on call SLIM to see if they would s/w parents since they requested  Awaiting call back

## 2018-03-20 NOTE — CASE MANAGEMENT
Initial Clinical Review    Admission: Date/Time/Statement: 3/19/18 @ 1852     Orders Placed This Encounter   Procedures    Inpatient Admission (expected length of stay for this patient is greater than two midnights)     Standing Status:   Standing     Number of Occurrences:   1     Order Specific Question:   Admitting Physician     Answer:   Nicol Fontana [[id number]]     Order Specific Question:   Level of Care     Answer:   Med Surg [[id number]]     Order Specific Question:   Estimated length of stay     Answer:   More than 2 Midnights     Order Specific Question:   Certification     Answer:   I certify that inpatient services are medically necessary for this patient for a duration of greater than two midnights  See H&P and MD Progress Notes for additional information about the patient's course of treatment  ED: Date/Time/Mode of Arrival:   ED Arrival Information     Expected Arrival Acuity Means of Arrival Escorted By Service Admission Type    - 3/19/2018 16:04 Urgent Walk-In Family Member General Medicine Urgent    Arrival Complaint    Vomiting; Fever          Chief Complaint:   Chief Complaint   Patient presents with    Vomiting     Patient reports vomting, diarrhea, and fever for two days  reports seen by PCP and reffered to ED   Shortness of Breath     Patient reports shortness of breath and dry cough which began on saturday  History of Illness:  Jennifer Mcpherson is a 29 y o  female who presents with fever and cough for 2 days  She also developed vomiting and diarrhea  She has been unable to keep anything down, including medicine for her cough  She recently found out 2 of her coworkers were diagnosed with pneumonia  She denies chest pain but does have back pain only with coughing  She denies any other medical problems and takes no medications at home     ED Vital Signs:   ED Triage Vitals [03/19/18 1613]   Temperature Pulse Respirations Blood Pressure SpO2   99 1 °F (37 3 °C) (!) 132 20 139/57 96 % Temp Source Heart Rate Source Patient Position - Orthostatic VS BP Location FiO2 (%)   Oral Monitor Sitting Right arm --      Pain Score       7        Wt Readings from Last 1 Encounters:   03/19/18 71 8 kg (158 lb 3 2 oz)       Vital Signs (abnormal):   above    Abnormal Labs/Diagnostic Test Results:   Lactic  Acid  2 8  WBC   11 56  H/H  10 3/30 1  NA  135  Chloride   98  AG  14  BUN/Creat   42/2 02  Albumin  2 8  PT  17 3     INR  1 41     PTT  38  CXR"      Right middle and lower lobe opacification compatible with pneumonia  ED Treatment:   Medication Administration from 03/19/2018 1603 to 03/19/2018 2139       Date/Time Order Dose Route Action Action by Comments     03/19/2018 1850 sodium chloride 0 9 % bolus 2,000 mL 0 mL Intravenous Stopped Asiya Carter RN      03/19/2018 1709 sodium chloride 0 9 % bolus 2,000 mL 2,000 mL Intravenous [address] Asiya Carter RN      03/19/2018 1723 acetaminophen (TYLENOL) tablet 650 mg 650 mg Oral Given Asiya Carter RN      03/19/2018 1709 ondansetron (ZOFRAN) injection 4 mg 4 mg Intravenous Given Asiya Carter RN      03/19/2018 1844 cefepime (MAXIPIME) 2 g/50 mL dextrose IVPB 0 mg Intravenous Stopped Asiya Carter RN      03/19/2018 1802 cefepime (MAXIPIME) 2 g/50 mL dextrose IVPB 2,000 mg Intravenous [address] Asiya Carter RN      03/19/2018 1925 sodium chloride 0 9 % bolus 200 mL 0 mL Intravenous Stopped Asiya Carter RN      03/19/2018 1850 sodium chloride 0 9 % bolus 200 mL 200 mL Intravenous [address] Asiya Carter RN      03/19/2018 2017 sodium chloride 0 9 % infusion 125 mL/hr Intravenous [address] Asiya Carter RN      03/19/2018 2017 sodium chloride 0 9 % bolus 1,000 mL 1,000 mL Intravenous Gallo Reilly RN           Past Medical/Surgical History:    Active Ambulatory Problems     Diagnosis Date Noted    No Active Ambulatory Problems     Resolved Ambulatory Problems     Diagnosis Date Noted    No Resolved Ambulatory Problems     Past Medical History:   Diagnosis Date    Depressed        Admitting Diagnosis: Vomiting [R11 10]  SOB (shortness of breath) [R06 02]    Age/Sex: 29 y o  female    Assessment/Plan:    Plan for the Primary Problem(s):  · Sepsis/pneumonia  ? Admit to med/surg  Started on cefepime in ED, will continue  Will order nebs PRN  R/o flu  Droplet precautions for now  Monitor WBC count  Continue IV fluids       Plan for Additional Problems:   · EMBER, secondary to dehydration- continue IV fluids  Avoid nephrotoxic medications  Zofran for vomiting  Recheck labs in AM     VTE Prophylaxis: Pharmacologic VTE Prophylaxis contraindicated due to low risk  / reason for no mechanical VTE prophylaxis low risk   Code Status: full code  POLST: There is no POLST form on file for this patient (pre-hospital)     Anticipated Length of Stay:  Patient will be admitted on an Inpatient basis with an anticipated length of stay of  Greater than 2 midnights     Justification for Hospital Stay: patient requires IV fluids and antibiotics    Admission Orders:   IP    3/19  @   3210  Scheduled Meds:   Current Facility-Administered Medications:  acetaminophen 650 mg Oral Q6H PRN Cy Shackle, PA-C    albuterol 2 puff Inhalation Q4H PRN Alexis Wills MD    albuterol 2 5 mg Nebulization Q6H PRN Cy Mi, PA-C    cefepime 2,000 mg Intravenous Q12H Cy Shackle, PA-C Last Rate: 2,000 mg (03/20/18 0533)   [START ON 3/21/2018] ferrous sulfate 325 mg Oral Daily With Breakfast Dia Gandara MD    guaiFENesin 600 mg Oral Q12H [address] Cy Shackle, PA-C    ondansetron 4 mg Intravenous Q6H PRN Cy Shackle, PA-C    sodium chloride 125 mL/hr Intravenous Continuous Cy Shackle, PA-C Last Rate: Stopped (03/20/18 1232)     Continuous Infusions:   sodium chloride 125 mL/hr Last Rate: Stopped (03/20/18 1232)     PRN Meds:   acetaminophen    albuterol    albuterol    ondansetron     Droplet precautions  Tele  O2  4 5  L    PROGRESS NOTE  3/20  Sepsis ([facility] [address] )   Assessment & Plan     PRESENT ON ADMISSION  Secondary to community-acquired pneumonia  Aggressive IV fluids and antibiotics as mentioned  Recheck lactic acid levels  Follow-up culture results           Pneumonia   Assessment & Plan     Present on admission  Continue IV cefepime for gram-negative coverage  Patient has allergy to azithromycin -- severe rash  Aggressive IV fluids, supportive care  Check flu swab             Acute kidney injury [facility])   Assessment & Plan     Present on admission  Most likely secondary to sepsis, dehydration  Creatinine trending down today at 1 34  Aggressive IV fluids to continue  Thank you,  [address] [place] [address] in the [employer] for 2017  Network Utilization Review Department  Phone: [phone]; Fax [phone]  ATTENTION: The Network Utilization Review Department is now centralized for our 7 Facilities  Make a note that we have a new phone and fax numbers for our Department  Please call with any questions or concerns to [phone] and carefully follow the prompts so that you are directed to the right person  All voicemails are confidential  Fax any determinations, approvals, denials, and requests for initial or continue stay review clinical to [phone]  Due to HIGH CALL volume, it would be easier if you could please send faxed requests to expedite your requests and in part, help us provide discharge notifications faster

## 2018-03-20 NOTE — ASSESSMENT & PLAN NOTE
Present on admission  Continue IV cefepime for gram-negative coverage  Patient has allergy to azithromycin -- severe rash  Aggressive IV fluids, supportive care  Check flu swab

## 2018-03-20 NOTE — PROGRESS NOTES
Patient had a fever of 103 4 temporal  Gave patient tylenol and will repeat temp check in around an hour   Will continue to monitor

## 2018-03-20 NOTE — ED NOTES
Patient transported to [place] [address] with RN while on cardiac monitor at this time      Shivani Rendon RN  03/19/18 3308

## 2018-03-20 NOTE — PROGRESS NOTES
Progress Note - An Bills 1990, 29 y o  female MRN: [id number]    Unit/Bed#: E5 -01 Encounter: [id number]    Primary Care Provider: Bebeto Carr DO   Date and time admitted to hospital: 3/19/2018  4:26 PM        * Sepsis [facility])   Assessment & Plan    PRESENT ON ADMISSION  Secondary to community-acquired pneumonia  Aggressive IV fluids and antibiotics as mentioned  Recheck lactic acid levels  Follow-up culture results  Pneumonia   Assessment & Plan    Present on admission  Continue IV cefepime for gram-negative coverage  Patient has allergy to azithromycin -- severe rash  Aggressive IV fluids, supportive care  Check flu swab  Acute kidney injury [facility])   Assessment & Plan    Present on admission  Most likely secondary to sepsis, dehydration  Creatinine trending down today at 1 34  Aggressive IV fluids to continue  Dehydration   Assessment & Plan    Secondary to sepsis  Refer above  Iron deficiency anemia   Assessment & Plan    Hemoglobin low on presentation  MCV low  Check iron panel  Start ferrous sulfate once daily  _____________________________________________________________________    SUBJECTIVE:   Patient seen and examined  Appears weak/flushed  Continues to have cough  Continue aggressive IV fluids and IV antibiotics  Follow up infective workup  OBJECTIVE:     Vitals:   HR:  [106-132] 106  Resp:  [18-20] 18  BP: ()/(51-79) 100/52  SpO2:  [88 %-96 %] 93 %  Temp (24hrs), Av 1 °F (37 3 °C), Min:98 1 °F (36 7 °C), Max:100 8 °F (38 2 °C)  Current: Temperature: 98 1 °F (36 7 °C)    Intake/Output Summary (Last 24 hours) at 18 1148  Last data filed at 18 0112   Gross per 24 hour   Intake             2250 ml   Output              300 ml   Net             1950 ml       Physical Exam:   GENERAL: AAO x 3  HEENT: atraumatic, normocephalic  Oral mucosa moist, no icterus, pallor  PERRLA +   Neck supple, no JVD, no lymphadenopathy, no thryomegaly  CHEST: B/L breath sounds heard, occasional wheezing  CVS: S1, S2  No cyanosis/clubbing or edema  ABDOMEN: Soft/obese/NT/BS heard  NEUROLOGICAL: CN II -XI grossly intact  No focal motor or sensory deficits  No signs of meningeal irritation or cerebellar dysfunction  EXTREMITIES: No cyanosis/clubbing or edema  LABS:  Results for Zenobia Lee (MRN [id number]) as of 3/20/2018 11:45   3/20/2018 05:45   eGFR 54   Sodium 140   Potassium 3 5   Chloride 108   CO2 20 (L)   Anion Gap 12   BUN 27 (H)   Creatinine 1 34 (H)   Glucose 112   Calcium 8 0 (L)   WBC 8 65   RBC 3 41 (L)   Hemoglobin 9 2 (L)   Hematocrit 27 3 (L)   MCV 80 (L)   MCH 27 0   MCHC 33 7   RDW 15 6 (H)   MPV 11 3   Protime 17 0 (H)   INR 1 38 (H)   PTT 41 (H)     Scheduled Meds:    Current Facility-Administered Medications:  acetaminophen 650 mg Oral Q6H PRN Eyad Diaz, PA-C    albuterol 2 puff Inhalation Q4H PRN Mack Morel MD    albuterol 2 5 mg Nebulization Q6H PRN Eyad Diaz, PA-C    cefepime 2,000 mg Intravenous Q12H Eyad Diaz, PA-C Last Rate: 2,000 mg (03/20/18 0533)   guaiFENesin 600 mg Oral Q12H [facility] Eyad Diaz, PA-C    ondansetron 4 mg Intravenous Q6H PRN Eyad Diaz, PA-C    sodium chloride 125 mL/hr Intravenous Continuous Eyad Nolanop, PA-C Last Rate: 125 mL/hr (03/19/18 2017)       Continuous Infusions:    sodium chloride 125 mL/hr Last Rate: 125 mL/hr (03/19/18 2017)       PRN Meds:    acetaminophen    albuterol    albuterol    ondansetron    VTE Prophylaxis:SCDs  DISPOSITION: Pending clinical stability  Anticipate hospitalization for the next 2-3 days  Time Spent for Care: 20 minutes   More than 50% of total time spent on counseling and coordination of care as described above  Family will be updated      Ignacio Cid MD  HOSPITALIST SERVICES  3/20/2018    PLEASE NOTE:  This encounter was completed utilizing the [employer] Direct Speech Voice Recognition Software  Grammatical errors, random word insertions, pronoun errors and incomplete sentences are occasional consequences of the system due to software limitations, ambient noise and hardware issues  These may be missed by proof reading prior to affixing electronic signature  Any questions or concerns about the content, text or information contained within the body of this dictation should be directly addressed to the physician for clarification   Please do not hesitate to call me directly if you have any any questions or concerns

## 2018-03-20 NOTE — ASSESSMENT & PLAN NOTE
PRESENT ON ADMISSION  Secondary to community-acquired pneumonia  Aggressive IV fluids and antibiotics as mentioned  Recheck lactic acid levels  Follow-up culture results

## 2018-03-20 NOTE — H&P
History and Physical - [facility]    Patient Information: Mariah Cho 29 y o  female MRN: [id number]  Unit/Bed#: ED 15 Encounter: [id number]  Admitting Physician: Lizzie Pimentel PA-C  PCP: James Day DO  Date of Admission:  03/19/18    Assessment/Plan:    Hospital Problem List:     Principal Problem:    Sepsis [facility])  Active Problems:    Pneumonia    EMBER    Plan for the Primary Problem(s):  · Sepsis/pneumonia  · Admit to med/surg  Started on cefepime in ED, will continue  Will order nebs PRN  R/o flu  Droplet precautions for now  Monitor WBC count  Continue IV fluids  Plan for Additional Problems:   · EMBER, secondary to dehydration- continue IV fluids  Avoid nephrotoxic medications  Zofran for vomiting  Recheck labs in AM    VTE Prophylaxis: Pharmacologic VTE Prophylaxis contraindicated due to low risk  / reason for no mechanical VTE prophylaxis low risk   Code Status: full code  POLST: There is no POLST form on file for this patient (pre-hospital)    Anticipated Length of Stay:  Patient will be admitted on an Inpatient basis with an anticipated length of stay of  Greater than 2 midnights  Justification for Hospital Stay: patient requires IV fluids and antibiotics    Total Time for Visit, including Counseling / Coordination of Care: 45 minutes  Greater than 50% of this total time spent on direct patient counseling and coordination of care  Chief Complaint:   Fever, cough    History of Present Illness:    Mariah Cho is a 29 y o  female who presents with fever and cough for 2 days  She also developed vomiting and diarrhea  She has been unable to keep anything down, including medicine for her cough  She recently found out 2 of her coworkers were diagnosed with pneumonia  She denies chest pain but does have back pain only with coughing  She denies any other medical problems and takes no medications at home       Review of Systems:    Review of Systems   Constitutional: Positive for appetite change, fatigue and fever  HENT: Negative  Eyes: Negative  Respiratory: Positive for cough and shortness of breath  Cardiovascular: Negative  Gastrointestinal: Positive for diarrhea, nausea and vomiting  Endocrine: Negative  Genitourinary: Negative  Musculoskeletal: Positive for back pain  Skin: Negative  Allergic/Immunologic: Negative  Neurological: Negative  Hematological: Negative  Psychiatric/Behavioral: Negative  Past Medical and Surgical History:     Past Medical History:   Diagnosis Date    Depressed        Past Surgical History:   Procedure Laterality Date    BREAST LUMPECTOMY Right        Meds/Allergies:    Prior to Admission medications    Medication Sig Start Date End Date Taking? Authorizing Provider   albuterol (PROVENTIL HFA,VENTOLIN HFA) 90 mcg/act inhaler Inhale 1-2 puffs every 6 (six) hours as needed for wheezing 11/25/16 3/19/18  Naman White DO   hydrocodone-chlorpheniramine polistirex (TUSSIONEX) 10-8 mg/5 mL ER suspension Take 5 mL by mouth every 12 (twelve) hours as needed for cough Max Daily Amount: 10 mL 11/25/16 3/19/18  Naman White DO     I have reviewed home medications with patient personally  Allergies:    Allergies   Allergen Reactions    Prednisone Hives    Penicillins Hives and Rash    Zithromax [Azithromycin] Hives and Rash       Social History:     Marital Status: Single   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  Patient Pre-hospital Living Situation: lives with parents  Patient Pre-hospital Level of Mobility: ambulatory  Patient Pre-hospital Diet Restrictions: none  Substance Use History:   History   Alcohol Use    Yes     Comment: social     History   Smoking Status    Never Smoker   Smokeless Tobacco    Never Used     History   Drug Use No       Family History:    non-contributory    Physical Exam:     Vitals:   Blood Pressure: 114/52 (03/19/18 2014)  Pulse: (!) 118 (03/19/18 2014)  Temperature: 98 9 °F (37 2 °C) (03/19/18 2014)  Temp Source: Oral (03/19/18 2014)  Respirations: 18 (03/19/18 2014)  Weight - Scale: 71 8 kg (158 lb 3 2 oz) (03/19/18 1613)  SpO2: 94 % (03/19/18 2014)    Physical Exam   Constitutional: She is oriented to person, place, and time  She appears well-developed and well-nourished  No distress  HENT:   Head: Normocephalic and atraumatic  Eyes: Conjunctivae are normal  Pupils are equal, round, and reactive to light  Neck: Normal range of motion  Neck supple  No JVD present  No tracheal deviation present  No thyromegaly present  Cardiovascular: Regular rhythm  tachy   Pulmonary/Chest: Effort normal  No respiratory distress  Occasional wheeze   Abdominal: Soft  Bowel sounds are normal  She exhibits no distension  There is no tenderness  Musculoskeletal: Normal range of motion  She exhibits no edema, tenderness or deformity  Lymphadenopathy:     She has no cervical adenopathy  Neurological: She is alert and oriented to person, place, and time  Skin: Skin is warm and dry  No rash noted  She is not diaphoretic  No erythema  No pallor  Psychiatric: She has a normal mood and affect  Her behavior is normal    Vitals reviewed  Additional Data:     Lab Results: I have personally reviewed pertinent reports  Results from last 7 days  Lab Units 03/19/18  1705   WBC Thousand/uL 11 56*   HEMOGLOBIN g/dL 10 3*   HEMATOCRIT % 30 1*   PLATELETS Thousands/uL 183   LYMPHO PCT % 3*   MONO PCT MAN % 2*   EOSINO PCT MANUAL % 0       Results from last 7 days  Lab Units 03/19/18  1705   SODIUM mmol/L 135*   POTASSIUM mmol/L 3 9   CHLORIDE mmol/L 98*   CO2 mmol/L 23   BUN mg/dL 42*   CREATININE mg/dL 2 02*   CALCIUM mg/dL 8 2*   TOTAL PROTEIN g/dL 6 8   BILIRUBIN TOTAL mg/dL 0 90   ALK PHOS U/L 60   ALT U/L 18   AST U/L 27   GLUCOSE RANDOM mg/dL 125       Results from last 7 days  Lab Units 03/19/18  1705   INR  1 41*       Imaging: I have personally reviewed pertinent reports        No results found  EKG, Pathology, and Other Studies Reviewed on Admission:   · EKG: tachy    Allscripts / Epic Records Reviewed: Yes     ** Please Note: This note has been constructed using a voice recognition system   **

## 2018-03-21 ENCOUNTER — APPOINTMENT (INPATIENT)
Dept: RADIOLOGY | Facility: HOSPITAL | Age: 28
DRG: 871 | End: 2018-03-21
Payer: COMMERCIAL

## 2018-03-21 ENCOUNTER — APPOINTMENT (INPATIENT)
Dept: CT IMAGING | Facility: HOSPITAL | Age: 28
DRG: 871 | End: 2018-03-21
Payer: COMMERCIAL

## 2018-03-21 PROBLEM — E86.0 DEHYDRATION: Status: RESOLVED | Noted: 2018-03-20 | Resolved: 2018-03-21

## 2018-03-21 LAB
ANION GAP SERPL CALCULATED.3IONS-SCNC: 12 MMOL/L (ref 4–13)
BUN SERPL-MCNC: 16 MG/DL (ref 5–25)
CALCIUM SERPL-MCNC: 7.8 MG/DL (ref 8.3–10.1)
CHLORIDE SERPL-SCNC: 108 MMOL/L (ref 100–108)
CO2 SERPL-SCNC: 20 MMOL/L (ref 21–32)
CREAT SERPL-MCNC: 1.08 MG/DL (ref 0.6–1.3)
ERYTHROCYTE [DISTWIDTH] IN BLOOD BY AUTOMATED COUNT: 15.7 % (ref 11.6–15.1)
FERRITIN SERPL-MCNC: 152 NG/ML (ref 8–388)
GFR SERPL CREATININE-BSD FRML MDRD: 70 ML/MIN/1.73SQ M
GLUCOSE SERPL-MCNC: 107 MG/DL (ref 65–140)
HCT VFR BLD AUTO: 26 % (ref 34.8–46.1)
HGB BLD-MCNC: 8.7 G/DL (ref 11.5–15.4)
IRON SATN MFR SERPL: 4 %
IRON SERPL-MCNC: 11 UG/DL (ref 50–170)
MCH RBC QN AUTO: 26.4 PG (ref 26.8–34.3)
MCHC RBC AUTO-ENTMCNC: 33.5 G/DL (ref 31.4–37.4)
MCV RBC AUTO: 79 FL (ref 82–98)
PLATELET # BLD AUTO: 213 THOUSANDS/UL (ref 149–390)
PMV BLD AUTO: 10.2 FL (ref 8.9–12.7)
POTASSIUM SERPL-SCNC: 3 MMOL/L (ref 3.5–5.3)
RBC # BLD AUTO: 3.29 MILLION/UL (ref 3.81–5.12)
SODIUM SERPL-SCNC: 140 MMOL/L (ref 136–145)
TIBC SERPL-MCNC: 246 UG/DL (ref 250–450)
WBC # BLD AUTO: 9.22 THOUSAND/UL (ref 4.31–10.16)

## 2018-03-21 PROCEDURE — 71250 CT THORAX DX C-: CPT

## 2018-03-21 PROCEDURE — 94640 AIRWAY INHALATION TREATMENT: CPT

## 2018-03-21 PROCEDURE — 94760 N-INVAS EAR/PLS OXIMETRY 1: CPT

## 2018-03-21 PROCEDURE — 82728 ASSAY OF FERRITIN: CPT | Performed by: INTERNAL MEDICINE

## 2018-03-21 PROCEDURE — 83550 IRON BINDING TEST: CPT | Performed by: INTERNAL MEDICINE

## 2018-03-21 PROCEDURE — 94660 CPAP INITIATION&MGMT: CPT

## 2018-03-21 PROCEDURE — 85027 COMPLETE CBC AUTOMATED: CPT | Performed by: INTERNAL MEDICINE

## 2018-03-21 PROCEDURE — 71046 X-RAY EXAM CHEST 2 VIEWS: CPT

## 2018-03-21 PROCEDURE — 83540 ASSAY OF IRON: CPT | Performed by: INTERNAL MEDICINE

## 2018-03-21 PROCEDURE — 80048 BASIC METABOLIC PNL TOTAL CA: CPT | Performed by: INTERNAL MEDICINE

## 2018-03-21 PROCEDURE — 87081 CULTURE SCREEN ONLY: CPT | Performed by: INTERNAL MEDICINE

## 2018-03-21 PROCEDURE — 99254 IP/OBS CNSLTJ NEW/EST MOD 60: CPT | Performed by: INTERNAL MEDICINE

## 2018-03-21 PROCEDURE — 99232 SBSQ HOSP IP/OBS MODERATE 35: CPT | Performed by: INTERNAL MEDICINE

## 2018-03-21 RX ORDER — ALPRAZOLAM 0.25 MG/1
0.25 TABLET ORAL 3 TIMES DAILY PRN
Status: DISCONTINUED | OUTPATIENT
Start: 2018-03-21 | End: 2018-03-28 | Stop reason: HOSPADM

## 2018-03-21 RX ORDER — POTASSIUM CHLORIDE 20 MEQ/1
40 TABLET, EXTENDED RELEASE ORAL DAILY
Status: COMPLETED | OUTPATIENT
Start: 2018-03-21 | End: 2018-03-22

## 2018-03-21 RX ORDER — LEVALBUTEROL 1.25 MG/.5ML
1.25 SOLUTION, CONCENTRATE RESPIRATORY (INHALATION) EVERY 6 HOURS PRN
Status: DISCONTINUED | OUTPATIENT
Start: 2018-03-21 | End: 2018-03-23

## 2018-03-21 RX ORDER — LEVALBUTEROL 1.25 MG/.5ML
SOLUTION, CONCENTRATE RESPIRATORY (INHALATION)
Status: COMPLETED
Start: 2018-03-21 | End: 2018-03-21

## 2018-03-21 RX ORDER — VANCOMYCIN HYDROCHLORIDE 1 G/200ML
15 INJECTION, SOLUTION INTRAVENOUS EVERY 12 HOURS
Status: DISCONTINUED | OUTPATIENT
Start: 2018-03-21 | End: 2018-03-23

## 2018-03-21 RX ORDER — LEVALBUTEROL 1.25 MG/.5ML
1.25 SOLUTION, CONCENTRATE RESPIRATORY (INHALATION) EVERY 8 HOURS PRN
Status: DISCONTINUED | OUTPATIENT
Start: 2018-03-21 | End: 2018-03-21

## 2018-03-21 RX ORDER — METHYLPREDNISOLONE SODIUM SUCCINATE 40 MG/ML
40 INJECTION, POWDER, LYOPHILIZED, FOR SOLUTION INTRAMUSCULAR; INTRAVENOUS EVERY 8 HOURS SCHEDULED
Status: DISCONTINUED | OUTPATIENT
Start: 2018-03-21 | End: 2018-03-21

## 2018-03-21 RX ADMIN — SODIUM CHLORIDE 100 ML/HR: 0.9 INJECTION, SOLUTION INTRAVENOUS at 14:10

## 2018-03-21 RX ADMIN — ACETAMINOPHEN 650 MG: 325 TABLET, FILM COATED ORAL at 17:35

## 2018-03-21 RX ADMIN — ACETAMINOPHEN 650 MG: 325 TABLET, FILM COATED ORAL at 09:16

## 2018-03-21 RX ADMIN — ALPRAZOLAM 0.25 MG: 0.25 TABLET ORAL at 03:58

## 2018-03-21 RX ADMIN — METHYLPREDNISOLONE SODIUM SUCCINATE 40 MG: 40 INJECTION, POWDER, FOR SOLUTION INTRAMUSCULAR; INTRAVENOUS at 13:12

## 2018-03-21 RX ADMIN — ACETAMINOPHEN 650 MG: 325 TABLET, FILM COATED ORAL at 03:45

## 2018-03-21 RX ADMIN — CEFEPIME 2000 MG: 2 INJECTION, POWDER, FOR SOLUTION INTRAMUSCULAR; INTRAVENOUS at 17:35

## 2018-03-21 RX ADMIN — LEVALBUTEROL 1.25 MG: 1.25 SOLUTION, CONCENTRATE RESPIRATORY (INHALATION) at 10:35

## 2018-03-21 RX ADMIN — FERROUS SULFATE TAB 325 MG (65 MG ELEMENTAL FE) 325 MG: 325 (65 FE) TAB at 09:17

## 2018-03-21 RX ADMIN — CEFEPIME 2000 MG: 2 INJECTION, POWDER, FOR SOLUTION INTRAMUSCULAR; INTRAVENOUS at 05:35

## 2018-03-21 RX ADMIN — POTASSIUM CHLORIDE 40 MEQ: 1500 TABLET, EXTENDED RELEASE ORAL at 09:16

## 2018-03-21 RX ADMIN — GUAIFENESIN 600 MG: 600 TABLET, EXTENDED RELEASE ORAL at 09:17

## 2018-03-21 RX ADMIN — SODIUM CHLORIDE 125 ML/HR: 0.9 INJECTION, SOLUTION INTRAVENOUS at 03:43

## 2018-03-21 RX ADMIN — GUAIFENESIN 600 MG: 600 TABLET, EXTENDED RELEASE ORAL at 20:11

## 2018-03-21 RX ADMIN — VANCOMYCIN HYDROCHLORIDE 1000 MG: 1 INJECTION, SOLUTION INTRAVENOUS at 11:34

## 2018-03-21 NOTE — ASSESSMENT & PLAN NOTE
Present on admission  Most likely secondary to sepsis, dehydration    Creatinine trending down to normal   Normal saline to continue at 100 mL/hour

## 2018-03-21 NOTE — ASSESSMENT & PLAN NOTE
Present on admission  Continue IV cefepime for gram-negative coverage  Will add IV vancomycin for possible MRSA/Gram-positive coverage  Patient has allergy to azithromycin -- severe rash  Aggressive IV fluids, IV steroids, supportive care  Repeat chest x-ray from today -- Worsening bilateral pneumonia with new left lower lobe consolidation  Will consult Pulmonary Medicine in view of the above findings  Low threshold to transfer to step-down unit

## 2018-03-21 NOTE — CONSULTS
Consultation - Pulmonary Medicine   Daisy Teresa 29 y o  female MRN: [id number]  Unit/Bed#: E5 -01 Encounter: [id number]      Assessment/Plan:    1  Acute hypoxic respiratory failure likely secondary to community-acquired pneumonia  1  Continue monitor oxygen saturation change oxygen therapy to maintain a pulse ox of equal to or greater than 90%-currently on 5 LNC with O2 saturation of 95%  2  Continue to encourage aggressive pulmonary toilet including: Incentive spirometry  3  Mucinex for secretion clearance  2  Right parapneumonic pleural effusion  1  CT chest STAT, possible IR consult pending CT results  2  Ultrasound at bedside did not reveal large enough fluid collection for thoracentesis at bedside  3  Sepsis likely secondary to community-acquired pneumonia  1  Initially treated with solely with Cefepime, due to increasing infiltrates was started on vancomycin  2  Strep/Legionella/FLU/RSV negative  3  Blood cultures negative x 24 hrs  History of Present Illness   Physician Requesting Consult: Cee Ramos MD  Reason for Consult / Principal Problem: Pneumonia   Hx and PE limited by: none  Chief Complaint: "I started coughing, vomiting and very tired  HPI: Daisy Teresa is a 29 y o   female who presented to [place] [address]  with complaints of:  Cough, vomiting and fatigue  Her only significant past medical history depression/anxiety  She reports the coughing started 5 days prior, and denies any sputum production  At the same time she also developed intractable vomiting and fatigue  She was also experiencing shortness of breath, but denied chest pain pain with inspiration or wheezing at this time  At home she attempted treatment with TheraFlu, but was unable to keep it down     She reports that 2 people at her work or recently diagnosed with pneumonia  She also denies fevers chills and night sweats at the time of admission or currently    She does not history of lung disease follow the pulmonologist or take nebulizers inhalers at home  She has never been intubated for breathing issues or hospitalized in the past   Upon admission to the hospital on 03/19/2018 a chest x-ray demonstrated right lower lobe room consolidation consistent with pneumonia  She was initially started on cefepime forcommunity-acquired pneumonia  She was initially found to be mildly hypoxic with the pulse ox of 91%  Her oxygen demands increased to requiring 6 L nasal cannula with an O2 sat of 90%  Add repeat chest x-ray today demonstrated increasing right lower lobe consolidation with a new left lower lobe consolidation  She was started on IV Solu-Medrol 40 mg q 8 hours as well as vancomycin  She denies a history of asthma or frequent steroid use  She does report an allergy to prednisone at which time she was treated for strep throat many years prior, resulted in hives  She continues to report a nonproductive cough, fatigue, nausea that has improved with Zofran administration, and shortness of breath  She currently denies:  Chest pain, pain with inspiration, fevers, chills, night sweats, diarrhea, vomiting, headache, dizziness, bronchospasm or hemoptysis    Inpatient consult to Pulmonology  Consult performed by: Gracia Tate ordered by: Yana Pierre          Review of Systems   Constitutional: Positive for appetite change and fatigue  Negative for activity change, chills, diaphoresis, fever and unexpected weight change  HENT: Negative for postnasal drip, rhinorrhea, sinus pain, sinus pressure, sore throat and trouble swallowing  Respiratory: Positive for cough, chest tightness and shortness of breath  Negative for choking, wheezing and stridor  Cardiovascular: Negative for chest pain, palpitations and leg swelling  Gastrointestinal: Positive for nausea and vomiting  Negative for diarrhea  Genitourinary: Negative for difficulty urinating  Musculoskeletal: Positive for myalgias  Skin: Positive for pallor  Negative for color change, rash and wound  Allergic/Immunologic: Negative for environmental allergies and immunocompromised state  Neurological: Negative for dizziness and headaches  Hematological: Does not bruise/bleed easily  Psychiatric/Behavioral: Negative for agitation, confusion, hallucinations and sleep disturbance  All other systems reviewed and are negative        Historical Information   Past Medical History:   Diagnosis Date    Depressed      Past Surgical History:   Procedure Laterality Date    BREAST LUMPECTOMY Right      Social History   History   Alcohol Use    Yes     Comment: social     History   Drug Use No     History   Smoking Status    Never Smoker   Smokeless Tobacco    Never Used     Occupational History: personal aide at [employer]    Family History: non-contributory    Meds/Allergies   all current active meds have been reviewed, pertinent pulmonary meds have been reviewed, current meds:   Current Facility-Administered Medications   Medication Dose Route Frequency    acetaminophen (TYLENOL) tablet 650 mg  650 mg Oral Q6H PRN    albuterol (PROVENTIL HFA,VENTOLIN HFA) inhaler 2 puff  2 puff Inhalation Q4H PRN    albuterol inhalation solution 2 5 mg  2 5 mg Nebulization Q6H PRN    ALPRAZolam (XANAX) tablet 0 25 mg  0 25 mg Oral TID PRN    cefepime (MAXIPIME) 2 g/50 mL dextrose IVPB  2,000 mg Intravenous Q12H    ferrous sulfate tablet 325 mg  325 mg Oral Daily With Breakfast    guaiFENesin (MUCINEX) 12 hr tablet 600 mg  600 mg Oral Q12H JUDITH    levalbuterol (XOPENEX) inhalation solution 1 25 mg  1 25 mg Nebulization Q6H PRN    methylPREDNISolone sodium succinate (Solu-MEDROL) injection 40 mg  40 mg Intravenous Q8H [address]    ondansetron (ZOFRAN) 8 mg in sodium chloride 0 9 % 50 mL IVPB  8 mg Intravenous Q6H PRN    potassium chloride (K-DUR,KLOR-CON) CR tablet 40 mEq  40 mEq Oral Daily    sodium chloride 0 9 % infusion  100 mL/hr Intravenous Continuous    vancomycin (VANCOCIN) IVPB (premix) 1,000 mg  15 mg/kg Intravenous Q12H    and PTA meds:   None       Allergies   Allergen Reactions    Prednisone Hives    Penicillins Hives and Rash    Zithromax [Azithromycin] Hives and Rash       Objective   Vitals: Blood pressure 111/59, pulse 91, temperature 98 7 °F (37 1 °C), temperature source Temporal, resp  rate 20, height 5' 3" (1 6 m), weight 71 8 kg (158 lb 3 2 oz), SpO2 91 %  5LNC,Body mass index is 28 02 kg/m²  Intake/Output Summary (Last 24 hours) at 03/21/18 1143  Last data filed at 03/20/18 2317   Gross per 24 hour   Intake          3102 08 ml   Output             1400 ml   Net          1702 08 ml     Invasive Devices     Peripheral Intravenous Line            Peripheral IV 03/19/18 Right Antecubital 1 day                Physical Exam   Constitutional: She is oriented to person, place, and time  She appears well-developed and well-nourished  No distress  HENT:   Head: Normocephalic and atraumatic  Eyes: Conjunctivae and EOM are normal  Pupils are equal, round, and reactive to light  Neck: Neck supple  No JVD present  Cardiovascular: Normal rate, regular rhythm and normal heart sounds  Exam reveals no gallop and no friction rub  No murmur heard  Pulmonary/Chest: Effort normal  No respiratory distress  She has decreased breath sounds in the right middle field, the right lower field, the left middle field and the left lower field  She has no wheezes  She has no rhonchi  She has no rales  She exhibits no tenderness  Abdominal: Soft  Bowel sounds are normal    Musculoskeletal: Normal range of motion  She exhibits no edema  Lymphadenopathy:     She has no cervical adenopathy  Neurological: She is oriented to person, place, and time  Skin: Skin is warm and dry  No rash noted  No erythema  There is pallor  Psychiatric: She has a normal mood and affect   Her behavior is normal  Judgment and thought content normal  Vitals reviewed  Lab Results:   I have personally reviewed pertinent lab results  , ABG: No results found for: PHART, XOL8GSU, PO2ART, EYF7NCY, R3MFJEAU, BEART, SOURCE, BNP: No results found for: BNP, CBC:   Lab Results   Component Value Date    WBC 9 22 03/21/2018    HGB 8 7 (L) 03/21/2018    HCT 26 0 (L) 03/21/2018    MCV 79 (L) 03/21/2018     03/21/2018    MCH 26 4 (L) 03/21/2018    MCHC 33 5 03/21/2018    RDW 15 7 (H) 03/21/2018    MPV 10 2 03/21/2018   , CMP:   Lab Results   Component Value Date     03/21/2018    K 3 0 (L) 03/21/2018     03/21/2018    CO2 20 (L) 03/21/2018    ANIONGAP 12 03/21/2018    BUN 16 03/21/2018    CREATININE 1 08 03/21/2018    GLUCOSE 107 03/21/2018    CALCIUM 7 8 (L) 03/21/2018    EGFR 70 03/21/2018     Imaging Studies: I have personally reviewed pertinent reports  and I have personally reviewed pertinent films in PACS  CXR 3/21/2018: bilateral consolidation  New left lower lobe consolidation with worsening right lower lobe consolidation  EKG, Pathology, and Other Studies: I have personally reviewed pertinent reports  and I have personally reviewed pertinent films in PACS  EKG 3/19/2018: ST  Pulmonary Results (PFTs, PSG): none    VTE Prophylaxis: Sequential compression device (Venodyne)     Code Status: Level 1 - Full Code        Portions of the record may have been created with voice recognition software  Occasional wrong word or "sound a like" substitutions may have occurred due to the inherent limitations of voice recognition software  Read the chart carefully and recognize, using context, where substitutions have occurred

## 2018-03-21 NOTE — ASSESSMENT & PLAN NOTE
PRESENT ON ADMISSION  Secondary to community-acquired pneumonia  Aggressive IV fluids and antibiotics as mentioned  Lactic acid levels normalized  Follow-up culture results

## 2018-03-21 NOTE — PROGRESS NOTES
Progress Note - Lopez Bowmaner 1990, 29 y o  female MRN: [id number]    Unit/Bed#: E5 -01 Encounter: [id number]    Primary Care Provider: Corrinne Fennel, DO   Date and time admitted to hospital: 3/19/2018  4:26 PM        * Sepsis [facility])   Assessment & Plan    PRESENT ON ADMISSION  Secondary to community-acquired pneumonia  Aggressive IV fluids and antibiotics as mentioned  Lactic acid levels normalized  Follow-up culture results  Pneumonia   Assessment & Plan    Present on admission  Continue IV cefepime for gram-negative coverage  Will add IV vancomycin for possible MRSA/Gram-positive coverage  Patient has allergy to azithromycin -- severe rash  Aggressive IV fluids, IV steroids, supportive care  Repeat chest x-ray from today -- Worsening bilateral pneumonia with new left lower lobe consolidation  Will consult Pulmonary Medicine in view of the above findings  Low threshold to transfer to step-down unit  Acute kidney injury [facility])   Assessment & Plan    Present on admission  Most likely secondary to sepsis, dehydration  Creatinine trending down to normal   Normal saline to continue at 100 mL/hour        Iron deficiency anemia   Assessment & Plan    Hemoglobin low on presentation  MCV low  Iron panel reviewed  Continue ferrous sulfate once daily  ______________________________________________________________________    SUBJECTIVE:   Patient seen and examined  Continues to have cough with increased respiratory rate  High oxygen requirements to maintain saturations  Chest x-ray in the morning worsening bilateral pneumonia with new left lower lobe consolidation  Minimal to no sputum production      OBJECTIVE:   Vitals:   HR:  [] 91  Resp:  [18-20] 20  BP: (104-111)/(58-59) 111/59  SpO2:  [91 %-95 %] 91 %  Temp (24hrs), Av 2 °F (37 9 °C), Min:98 7 °F (37 1 °C), Max:103 2 °F (39 6 °C)  Current: Temperature: 98 7 °F (37 1 °C)    Intake/Output Summary (Last 24 hours) at 03/21/18 1258  Last data filed at 03/20/18 2317   Gross per 24 hour   Intake          3102 08 ml   Output             1400 ml   Net          1702 08 ml       Physical Exam:   GENERAL: AAO x 3, flushed facies  HEENT: atraumatic, normocephalic  Oral mucosa moist, no icterus, pallor  PERRLA +  Neck supple, no JVD, no lymphadenopathy, no thryomegaly  CHEST: B/L breath sounds heard, occasional wheezing  CVS: S1, S2  No cyanosis/clubbing or edema  ABDOMEN: Soft/obese/NT/BS heard  NEUROLOGICAL: CN II -XI grossly intact  No focal motor or sensory deficits  No signs of meningeal irritation or cerebellar dysfunction  EXTREMITIES: No cyanosis/clubbing or edema      LABS:  Results for Tunde Amaro (MRN [id number]) as of 3/21/2018 12:54   3/21/2018 05:32 3/21/2018 06:14   eGFR 70    Sodium 140    Potassium 3 0 (L)    Chloride 108    CO2 20 (L)    Anion Gap 12    BUN 16    Creatinine 1 08    Glucose 107    Calcium 7 8 (L)    Iron  11 (L)   Ferritin  152   Iron Saturation  4   TIBC  246 (L)   WBC 9 22    RBC 3 29 (L)    Hemoglobin 8 7 (L)    Hematocrit 26 0 (L)    MCV 79 (L)    MCH 26 4 (L)    MCHC 33 5    RDW 15 7 (H)    Platelets 440    MPV 10 2      Scheduled Meds:    Current Facility-Administered Medications:  acetaminophen 650 mg Oral Q6H PRN Sommer Mcclure PA-C    albuterol 2 puff Inhalation Q4H PRN Jovanny Montanez MD    albuterol 2 5 mg Nebulization Q6H PRN Sommer Mcclure PA-C    ALPRAZolam 0 25 mg Oral TID PRN GILBERTO Zepeda    cefepime 2,000 mg Intravenous Q12H Sommer Mcclure PA-C Last Rate: 2,000 mg (03/21/18 0535)   ferrous sulfate 325 mg Oral Daily With Breakfast Mya Villegas MD    guaiFENesin 600 mg Oral Q12H [address] Sommer Mcclure PA-C    levalbuterol 1 25 mg Nebulization Q6H PRN Manuel Cowan MD    methylPREDNISolone sodium succinate 40 mg Intravenous Q8H [address] Tonny Escobar MD    ondansetron 8 mg Intravenous Q6H PRN GILBERTO Zepeda Last Rate: 8 mg (03/20/18 1581) potassium chloride 40 mEq Oral Daily Tonny Decker MD    sodium chloride 100 mL/hr Intravenous Continuous Cidny Knott MD Last Rate: 100 mL/hr (03/21/18 1135)   vancomycin 15 mg/kg Intravenous Q12H Cindy Knott MD Last Rate: 1,000 mg (03/21/18 1134)       Continuous Infusions:    sodium chloride 100 mL/hr Last Rate: 100 mL/hr (03/21/18 1135)       PRN Meds:    acetaminophen    albuterol    albuterol    ALPRAZolam    levalbuterol    ondansetron      VTE Prophylaxis:  SCDs  DISPOSITION: Continue current management, not medically stable for discharge  Time Spent for Care: 20 minutes   More than 50% of total time spent on counseling and coordination of care as described above  Family will be updated  Cosme Chavarria MD  HOSPITALIST SERVICES  3/21/2018    PLEASE NOTE:  This encounter was completed utilizing the [employer] Direct Speech Voice Recognition Software  Grammatical errors, random word insertions, pronoun errors and incomplete sentences are occasional consequences of the system due to software limitations, ambient noise and hardware issues  These may be missed by proof reading prior to affixing electronic signature  Any questions or concerns about the content, text or information contained within the body of this dictation should be directly addressed to the physician for clarification   Please do not hesitate to call me directly if you have any any questions or concerns

## 2018-03-22 LAB
ANION GAP SERPL CALCULATED.3IONS-SCNC: 11 MMOL/L (ref 4–13)
BUN SERPL-MCNC: 16 MG/DL (ref 5–25)
CALCIUM SERPL-MCNC: 8.6 MG/DL (ref 8.3–10.1)
CHLORIDE SERPL-SCNC: 108 MMOL/L (ref 100–108)
CO2 SERPL-SCNC: 21 MMOL/L (ref 21–32)
CREAT SERPL-MCNC: 0.92 MG/DL (ref 0.6–1.3)
ERYTHROCYTE [DISTWIDTH] IN BLOOD BY AUTOMATED COUNT: 16 % (ref 11.6–15.1)
GFR SERPL CREATININE-BSD FRML MDRD: 85 ML/MIN/1.73SQ M
GLUCOSE SERPL-MCNC: 143 MG/DL (ref 65–140)
HCT VFR BLD AUTO: 27.4 % (ref 34.8–46.1)
HGB BLD-MCNC: 9.5 G/DL (ref 11.5–15.4)
MCH RBC QN AUTO: 27.1 PG (ref 26.8–34.3)
MCHC RBC AUTO-ENTMCNC: 34.7 G/DL (ref 31.4–37.4)
MCV RBC AUTO: 78 FL (ref 82–98)
MRSA NOSE QL CULT: NORMAL
PLATELET # BLD AUTO: 220 THOUSANDS/UL (ref 149–390)
PMV BLD AUTO: 9.7 FL (ref 8.9–12.7)
POTASSIUM SERPL-SCNC: 3.9 MMOL/L (ref 3.5–5.3)
RBC # BLD AUTO: 3.51 MILLION/UL (ref 3.81–5.12)
SODIUM SERPL-SCNC: 140 MMOL/L (ref 136–145)
WBC # BLD AUTO: 13.92 THOUSAND/UL (ref 4.31–10.16)

## 2018-03-22 PROCEDURE — 80048 BASIC METABOLIC PNL TOTAL CA: CPT | Performed by: INTERNAL MEDICINE

## 2018-03-22 PROCEDURE — 94660 CPAP INITIATION&MGMT: CPT

## 2018-03-22 PROCEDURE — 85027 COMPLETE CBC AUTOMATED: CPT | Performed by: INTERNAL MEDICINE

## 2018-03-22 PROCEDURE — 99232 SBSQ HOSP IP/OBS MODERATE 35: CPT | Performed by: INTERNAL MEDICINE

## 2018-03-22 PROCEDURE — 87389 HIV-1 AG W/HIV-1&-2 AB AG IA: CPT | Performed by: NURSE PRACTITIONER

## 2018-03-22 RX ADMIN — CEFEPIME HYDROCHLORIDE 2000 MG: 2 INJECTION, SOLUTION INTRAVENOUS at 06:03

## 2018-03-22 RX ADMIN — CEFEPIME HYDROCHLORIDE 2000 MG: 2 INJECTION, SOLUTION INTRAVENOUS at 18:07

## 2018-03-22 RX ADMIN — METRONIDAZOLE 500 MG: 500 INJECTION, SOLUTION INTRAVENOUS at 18:07

## 2018-03-22 RX ADMIN — ALPRAZOLAM 0.25 MG: 0.25 TABLET ORAL at 20:32

## 2018-03-22 RX ADMIN — VANCOMYCIN HYDROCHLORIDE 1000 MG: 1 INJECTION, SOLUTION INTRAVENOUS at 22:47

## 2018-03-22 RX ADMIN — SODIUM CHLORIDE 100 ML/HR: 0.9 INJECTION, SOLUTION INTRAVENOUS at 01:38

## 2018-03-22 RX ADMIN — ENOXAPARIN SODIUM 40 MG: 40 INJECTION SUBCUTANEOUS at 08:42

## 2018-03-22 RX ADMIN — SODIUM CHLORIDE 100 ML/HR: 0.9 INJECTION, SOLUTION INTRAVENOUS at 14:52

## 2018-03-22 RX ADMIN — ACETAMINOPHEN 650 MG: 325 TABLET, FILM COATED ORAL at 20:32

## 2018-03-22 RX ADMIN — GUAIFENESIN 600 MG: 600 TABLET, EXTENDED RELEASE ORAL at 20:31

## 2018-03-22 RX ADMIN — VANCOMYCIN HYDROCHLORIDE 1000 MG: 1 INJECTION, SOLUTION INTRAVENOUS at 00:32

## 2018-03-22 RX ADMIN — FERROUS SULFATE TAB 325 MG (65 MG ELEMENTAL FE) 325 MG: 325 (65 FE) TAB at 08:43

## 2018-03-22 RX ADMIN — METRONIDAZOLE 500 MG: 500 INJECTION, SOLUTION INTRAVENOUS at 01:49

## 2018-03-22 RX ADMIN — GUAIFENESIN 600 MG: 600 TABLET, EXTENDED RELEASE ORAL at 08:43

## 2018-03-22 RX ADMIN — METRONIDAZOLE 500 MG: 500 INJECTION, SOLUTION INTRAVENOUS at 08:42

## 2018-03-22 RX ADMIN — POTASSIUM CHLORIDE 40 MEQ: 1500 TABLET, EXTENDED RELEASE ORAL at 08:43

## 2018-03-22 RX ADMIN — VANCOMYCIN HYDROCHLORIDE 1000 MG: 1 INJECTION, SOLUTION INTRAVENOUS at 10:45

## 2018-03-22 NOTE — ASSESSMENT & PLAN NOTE
PRESENT ON ADMISSION  Secondary to community-acquired pneumonia/ parapneumonic effusion  Aggressive IV fluids and IV antibiotics coverage broadened  Patient transferred to step-down yesterday evening in view of worsening respiratory status  Follow-up blood culture x2 have been negative  MRSA screen in process

## 2018-03-22 NOTE — PROGRESS NOTES
Progress Note - Pulmonary   Eren Godfrey 29 y o  female MRN: [id number]  Unit/Bed#: ICU 09 Encounter: [id number]    Assessment/Plan:  1  Sepsis secondary to community acquired pneumonia  1  Continue Cefepime D#4 and Vancomycin D#2 and flagyl D#2  2  Cultures remain negative  2  Acute hypoxic respiratory failure secondary CAP  1  Patient was transitioned to high flow nasal cannula last evening due to increased work of breath and hypoxia  2  Wean oxygen to keep saturation > 88%  3  Loculated Right parapneumonic pleural effusion    ----------------------------------------------------------------------------------------------------------------------    HPI/Interval History:   Last evening patient became more hypoxic and increased work of breathing  Patient was placed on high flow nasal cannula with significant improvement  Vitals:   Blood pressure 113/72, pulse 74, temperature 98 3 °F (36 8 °C), temperature source Temporal, resp  rate 20, height 5' 3" (1 6 m), weight 71 8 kg (158 lb 3 2 oz), SpO2 97 %  ,Body mass index is 28 02 kg/m²  SpO2: 97 %  SpO2 Activity: At Rest  O2 Device: None (Room air)    Physical Exam:   Gen: mild distress  HEENT: NC/AT PERRLA EOMI  Neck: supple, no JVD, trachea midline, no adenopathy  Chest: scattered rhonchi   Cardiac: Clear S1 and S2  Abdomen: Soft Nt/ND +BS  Extremities: no edema      Labs:    CBC:    Results from last 7 days  Lab Units 03/22/18  0442   WBC Thousand/uL 13 92*   HEMOGLOBIN g/dL 9 5*   HEMATOCRIT % 27 4*   PLATELETS Thousands/uL 220         BMP:     Results from last 7 days  Lab Units 03/22/18  0442   SODIUM mmol/L 140   POTASSIUM mmol/L 3 9   CHLORIDE mmol/L 108   CO2 mmol/L 21   BUN mg/dL 16   CREATININE mg/dL 0 92   GLUCOSE RANDOM mg/dL 143*   CALCIUM mg/dL 8 6           Imaging studies:  Ct chest: Multi lobar pneumonia (worse in the right lower lobe)  Small bilateral effusions        GILBERTO Polo

## 2018-03-22 NOTE — SOCIAL WORK
CM met with the patient to do a general open; the patient lives in a bi-level home with her family  Her br/ba are on the 2nd floor  She drives  She works as a teachers aid  She is independent with adls and ambulation  No hx of VNA or STR  PCP is Dr Claudette Veras in [place]  No POA, she would wish for her parents to be her healthcare agent if she was ever unable to make decisions for herself  She uses the [facility] in [place] for rx needs  CM following

## 2018-03-22 NOTE — PLAN OF CARE
Absence or prevention of progression during hospitalization Not Progressing    Pt xray and CT scan show worsening pneumonia, admitted to ICU for close monitoring overnight  Pt WBC up 13 92 from 9 32  Pt on vanco, cefepime, and flagyl  Afebrile overnight

## 2018-03-22 NOTE — NURSING NOTE
Pt to ICU from E5 for higher level of care  Pt transferred on 6L nasal canula satting 88-92%; WOB slightly increased  Approx 2100 pt became tachypnic and sats dropped to 80%  Per ICU NP switching pt to High Flow  Pt A+O x4, able to make needs known appropriately  Will continue to monitor

## 2018-03-22 NOTE — PROGRESS NOTES
Progress Note - Jennifer Mcpherson 1990, 29 y o  female MRN: [id number]    Unit/Bed#: ICU 09 Encounter: [id number]    Primary Care Provider: Lynette Kaur DO   Date and time admitted to hospital: 3/19/2018  4:26 PM        * Sepsis [facility])   Assessment & Plan    PRESENT ON ADMISSION  Secondary to community-acquired pneumonia/ parapneumonic effusion  Aggressive IV fluids and IV antibiotics coverage broadened  Patient transferred to step-down yesterday evening in view of worsening respiratory status  Follow-up blood culture x2 have been negative  MRSA screen in process  Pneumonia   Assessment & Plan    Present on admission  Development of right-sided loculated parapneumonic effusion  Much improved on high-flow oxygen  Continue IV cefepime for gram-negative coverage (Day 4) + IV vancomycin for possible MRSA/Gram-positive coverage(Day 2) + IV Flagyl for possible anaerobic coverage ( Day 2)  Patient has allergy to azithromycin -- severe rash  Aggressive IV fluids, supportive care, maintain step-down status  Appreciate Pulmonary Medicine input  Patient consented for HIV testing  Discussed with ICU nurse practitioner  Acute kidney injury [facility])   Assessment & Plan    Present on admission  Serum creatinine at baseline  Acute kidney has resolved  Iron deficiency anemia   Assessment & Plan    Hemoglobin low on presentation  MCV low  Iron panel reviewed  Continue ferrous sulfate once daily  _____________________________________________________________________    SUBJECTIVE:   Patient seen and examined  She states she feels a lot better compared to last 2 days  She states she finally had a good night's sleep  Responding well to high-flow oxygen  Patient has remained afebrile for the last 24 hours      OBJECTIVE:     Vitals:   HR:  [] 66  Resp:  [19-35] 35  BP: (100-121)/(56-72) 107/62  SpO2:  [90 %-99 %] 95 %  Temp (24hrs), Av °F (37 2 °C), Min:98 3 °F (36 8 °C), Max:99 9 °F (37 7 °C)  Current: Temperature: 98 9 °F (37 2 °C)    Intake/Output Summary (Last 24 hours) at 03/22/18 1033  Last data filed at 03/22/18 0001   Gross per 24 hour   Intake             1900 ml   Output             1000 ml   Net              900 ml       Physical Exam:   GENERAL: AAO x 3  HEENT: atraumatic, normocephalic  Oral mucosa moist, no icterus, pallor  PERRLA +  Neck supple, no JVD, no lymphadenopathy, no thryomegaly  CHEST: B/L breath sounds heard, occasional basal crackles  CVS: S1, S2  No cyanosis/clubbing or edema  ABDOMEN: Soft/obese/NT/BS heard  NEUROLOGICAL: CN II -XI grossly intact  No focal motor or sensory deficits  No signs of meningeal irritation or cerebellar dysfunction  EXTREMITIES: No cyanosis/clubbing or edema      LABS:  Results for Rose Argueta (MRN [id number]) as of 3/22/2018 10:27   3/22/2018 04:42   eGFR 85   Sodium 140   Potassium 3 9   Chloride 108   CO2 21   Anion Gap 11   BUN 16   Creatinine 0 92   Glucose 143 (H)   Calcium 8 6   WBC 13 92 (H)   RBC 3 51 (L)   Hemoglobin 9 5 (L)   Hematocrit 27 4 (L)   MCV 78 (L)   MCH 27 1   MCHC 34 7   RDW 16 0 (H)   Platelets 625   MPV 9 7     Scheduled Meds:    Current Facility-Administered Medications:  acetaminophen 650 mg Oral Q6H PRN Pradip Reich PA-C    albuterol 2 puff Inhalation Q4H PRN Gissel Cohen MD    albuterol 2 5 mg Nebulization Q6H PRN Pradip Reich PA-C    ALPRAZolam 0 25 mg Oral TID PRN [facility], CRNP    cefepime 2,000 mg Intravenous Q12H Marianne Cardenas MD Last Rate: 2,000 mg (03/22/18 0603)   enoxaparin 40 mg Subcutaneous Q24H [address] Vanesa Villegas MD    ferrous sulfate 325 mg Oral Daily With Breakfast Marianne Cardenas MD    guaiFENesin 600 mg Oral Q12H [address] Pradip Reich PA-C    levalbuterol 1 25 mg Nebulization Q6H PRN Scott Ritter MD    metroNIDAZOLE 500 mg Intravenous Q8H Vanesa Villegas MD Last Rate: 500 mg (03/22/18 0709)   ondansetron 8 mg Intravenous Q6H PRN [facility], CRNP Last Rate: 8 mg (03/20/18 2136)   sodium chloride 100 mL/hr Intravenous Continuous Gurjit Cho MD Last Rate: 100 mL/hr (03/22/18 0138)   vancomycin 15 mg/kg Intravenous Q12H Gurjit Cho MD Last Rate: 1,000 mg (03/22/18 0032)       Continuous Infusions:    sodium chloride 100 mL/hr Last Rate: 100 mL/hr (03/22/18 0138)       PRN Meds:    acetaminophen    albuterol    albuterol    ALPRAZolam    levalbuterol    ondansetron      VTE Prophylaxis: Enoxaparin subcutaneously  DISPOSITION: Continue step-down status for today  Time Spent for Care: 20 minutes   More than 50% of total time spent on counseling and coordination of care as described above  Patient's mother has been updated  Rayna Jackson MD  HOSPITALIST SERVICES  3/22/2018    PLEASE NOTE:  This encounter was completed utilizing the [employer] Direct Speech Voice Recognition Software  Grammatical errors, random word insertions, pronoun errors and incomplete sentences are occasional consequences of the system due to software limitations, ambient noise and hardware issues  These may be missed by proof reading prior to affixing electronic signature  Any questions or concerns about the content, text or information contained within the body of this dictation should be directly addressed to the physician for clarification   Please do not hesitate to call me directly if you have any any questions or concerns

## 2018-03-22 NOTE — ASSESSMENT & PLAN NOTE
Present on admission  Development of right-sided loculated parapneumonic effusion  Much improved on high-flow oxygen  Continue IV cefepime for gram-negative coverage (Day 4) + IV vancomycin for possible MRSA/Gram-positive coverage(Day 2) + IV Flagyl for possible anaerobic coverage ( Day 2)  Patient has allergy to azithromycin -- severe rash  Aggressive IV fluids, supportive care, maintain step-down status  Appreciate Pulmonary Medicine input  Patient consented for HIV testing  Discussed with ICU nurse practitioner

## 2018-03-23 PROBLEM — N17.9 ACUTE KIDNEY INJURY (HCC): Status: RESOLVED | Noted: 2018-03-20 | Resolved: 2018-03-23

## 2018-03-23 PROBLEM — J96.01 ACUTE RESPIRATORY FAILURE WITH HYPOXIA (HCC): Status: ACTIVE | Noted: 2018-03-23

## 2018-03-23 LAB
ERYTHROCYTE [DISTWIDTH] IN BLOOD BY AUTOMATED COUNT: 16.4 % (ref 11.6–15.1)
HCT VFR BLD AUTO: 22.3 % (ref 34.8–46.1)
HGB BLD-MCNC: 7.6 G/DL (ref 11.5–15.4)
HIV 1+2 AB+HIV1 P24 AG SERPL QL IA: NORMAL
MCH RBC QN AUTO: 27 PG (ref 26.8–34.3)
MCHC RBC AUTO-ENTMCNC: 34.1 G/DL (ref 31.4–37.4)
MCV RBC AUTO: 79 FL (ref 82–98)
PLATELET # BLD AUTO: 211 THOUSANDS/UL (ref 149–390)
PMV BLD AUTO: 9.9 FL (ref 8.9–12.7)
RBC # BLD AUTO: 2.81 MILLION/UL (ref 3.81–5.12)
WBC # BLD AUTO: 13.05 THOUSAND/UL (ref 4.31–10.16)

## 2018-03-23 PROCEDURE — 85027 COMPLETE CBC AUTOMATED: CPT | Performed by: INTERNAL MEDICINE

## 2018-03-23 PROCEDURE — 99232 SBSQ HOSP IP/OBS MODERATE 35: CPT | Performed by: INTERNAL MEDICINE

## 2018-03-23 PROCEDURE — 94660 CPAP INITIATION&MGMT: CPT

## 2018-03-23 PROCEDURE — 94640 AIRWAY INHALATION TREATMENT: CPT

## 2018-03-23 RX ORDER — SODIUM CHLORIDE FOR INHALATION 0.9 %
3 VIAL, NEBULIZER (ML) INHALATION EVERY 6 HOURS PRN
Status: DISCONTINUED | OUTPATIENT
Start: 2018-03-23 | End: 2018-03-28 | Stop reason: HOSPADM

## 2018-03-23 RX ORDER — ONDANSETRON 2 MG/ML
INJECTION INTRAMUSCULAR; INTRAVENOUS
Status: COMPLETED
Start: 2018-03-23 | End: 2018-03-23

## 2018-03-23 RX ORDER — LEVALBUTEROL 1.25 MG/.5ML
1.25 SOLUTION, CONCENTRATE RESPIRATORY (INHALATION)
Status: DISCONTINUED | OUTPATIENT
Start: 2018-03-23 | End: 2018-03-28 | Stop reason: HOSPADM

## 2018-03-23 RX ADMIN — ONDANSETRON 4 MG: 2 INJECTION INTRAMUSCULAR; INTRAVENOUS at 18:43

## 2018-03-23 RX ADMIN — ENOXAPARIN SODIUM 40 MG: 40 INJECTION SUBCUTANEOUS at 09:32

## 2018-03-23 RX ADMIN — LEVALBUTEROL HYDROCHLORIDE 1.25 MG: 1.25 SOLUTION, CONCENTRATE RESPIRATORY (INHALATION) at 13:29

## 2018-03-23 RX ADMIN — CEFEPIME HYDROCHLORIDE 2000 MG: 2 INJECTION, SOLUTION INTRAVENOUS at 05:21

## 2018-03-23 RX ADMIN — METRONIDAZOLE 500 MG: 500 INJECTION, SOLUTION INTRAVENOUS at 09:32

## 2018-03-23 RX ADMIN — ONDANSETRON 8 MG: 2 INJECTION INTRAMUSCULAR; INTRAVENOUS at 20:26

## 2018-03-23 RX ADMIN — METRONIDAZOLE 500 MG: 500 INJECTION, SOLUTION INTRAVENOUS at 17:30

## 2018-03-23 RX ADMIN — GUAIFENESIN 600 MG: 600 TABLET, EXTENDED RELEASE ORAL at 21:16

## 2018-03-23 RX ADMIN — CEFEPIME HYDROCHLORIDE 2000 MG: 2 INJECTION, SOLUTION INTRAVENOUS at 17:11

## 2018-03-23 RX ADMIN — LEVALBUTEROL HYDROCHLORIDE 1.25 MG: 1.25 SOLUTION, CONCENTRATE RESPIRATORY (INHALATION) at 19:04

## 2018-03-23 RX ADMIN — LEVALBUTEROL HYDROCHLORIDE 1.25 MG: 1.25 SOLUTION, CONCENTRATE RESPIRATORY (INHALATION) at 04:38

## 2018-03-23 RX ADMIN — GUAIFENESIN 600 MG: 600 TABLET, EXTENDED RELEASE ORAL at 09:32

## 2018-03-23 RX ADMIN — METRONIDAZOLE 500 MG: 500 INJECTION, SOLUTION INTRAVENOUS at 01:13

## 2018-03-23 RX ADMIN — ISODIUM CHLORIDE 3 ML: 0.03 SOLUTION RESPIRATORY (INHALATION) at 19:04

## 2018-03-23 RX ADMIN — ISODIUM CHLORIDE 3 ML: 0.03 SOLUTION RESPIRATORY (INHALATION) at 04:38

## 2018-03-23 RX ADMIN — FERROUS SULFATE TAB 325 MG (65 MG ELEMENTAL FE) 325 MG: 325 (65 FE) TAB at 09:30

## 2018-03-23 RX ADMIN — VANCOMYCIN HYDROCHLORIDE 1000 MG: 1 INJECTION, SOLUTION INTRAVENOUS at 12:14

## 2018-03-23 RX ADMIN — ISODIUM CHLORIDE 3 ML: 0.03 SOLUTION RESPIRATORY (INHALATION) at 13:29

## 2018-03-23 NOTE — PROGRESS NOTES
Progress Note - Pulmonary   Steffen Simmons 29 y o  female MRN: [id number]  Unit/Bed#: ICU 09 Encounter: [id number]      Assessment/Plan:  1  Acute hypoxic respiratory failure secondary to multi-lobar community acquired pneumonia  1  Continue to monitor oxygen saturation and titrate oxygen therapy to maintain pulse oxymetry of equal to or greater than 90%  2  Continue pulmonary toilet with: incentive spirometry, add flutter valve, continue to encourage OOB to chair  2  Sepsis secondary to Multi-lobar community acquired pneumonia  1  Change bronchodilators to TID  2  Leukocytosis remains stable at 13 05  3  Tmax 99 6  4  She is continuing to demonstrate clinical improvement and has been transitioned to NC at 6L  5  Today is Cefepime day 5/7, Flagyl day 3 for suspected aspiration and gram negative coverage  6   vancomycin day 3-discontinue as MRSA culture negative  3  Small right parapneumonic effusion  1  Thoracentesis not indicated at this time        Subjective:   Paulo Huerta was seen sitting in the chair upon entering the room  She complains of continued chest tightness overnight, that improved with nebulizer treatment  She reports mild improvement in presenting symptoms  She continues to have a nonproductive cough, and feels that it is difficult to expectorate her secretions  She also continues to experience extreme fatigue and weakness and is concerned with her weakness while ambulating  She currently denies: chest pain, pain with inspiration, fevers, chills, nausea, vomiting, diarrhea, headache, hemoptysis or bronchospasm  Objective:         Vitals: Blood pressure 146/76, pulse 96, temperature 99 6 °F (37 6 °C), temperature source Temporal, resp  rate (!) 28, height 5' 3" (1 6 m), weight 71 8 kg (158 lb 3 2 oz), SpO2 93 %  , 6LNC, Body mass index is 28 02 kg/m²        Intake/Output Summary (Last 24 hours) at 03/23/18 0919  Last data filed at 03/23/18 0521   Gross per 24 hour   Intake          2886 67 ml Output              900 ml   Net          1986 67 ml         Physical Exam  Gen: Awake, alert, oriented x 3, no acute distress  HEENT: Mucous membranes moist, no oral lesions, no thrush  NECK: No accessory muscle use, JVP not elevated  Cardiac: Regular, single S1, single S2, no murmurs, no rubs, no gallops  Lungs: bronchial breath sounds on the right lower lobe, diminished bilaterally with-improved aeration from previous exam right lower lobe  Abdomen: normoactive bowel sounds, soft nontender, nondistended, no rebound or rigidity, no guarding  Extremities: no cyanosis, no clubbing, no edema    Labs: I have personally reviewed pertinent lab results  , CBC:   Lab Results   Component Value Date    WBC 13 05 (H) 03/23/2018    HGB 7 6 (L) 03/23/2018    HCT 22 3 (L) 03/23/2018    MCV 79 (L) 03/23/2018     03/23/2018    MCH 27 0 03/23/2018    MCHC 34 1 03/23/2018    RDW 16 4 (H) 03/23/2018    MPV 9 9 03/23/2018     Imaging and other studies: no new pulmonary imaging to review      GILBERTO Prasad

## 2018-03-23 NOTE — ASSESSMENT & PLAN NOTE
PRESENT ON ADMISSION  Secondary to community-acquired pneumonia/ parapneumonic effusion  Aggressive IV fluids and IV antibiotics coverage broadened  Patient transferred to step-down evening of 03/21 in view of worsening respiratory status  Follow-up blood culture x2 have been negative  MRSA screen in process

## 2018-03-23 NOTE — PROGRESS NOTES
Progress Note - Vincent Arguelles 1990, 29 y o  female MRN: [id number]    Unit/Bed#: ICU 09 Encounter: [id number]    Primary Care Provider: Soo Gandhi,    Date and time admitted to hospital: 3/19/2018  4:26 PM        * Sepsis [facility])   Assessment & Plan    PRESENT ON ADMISSION  Secondary to community-acquired pneumonia/ parapneumonic effusion  Aggressive IV fluids and IV antibiotics coverage broadened  Patient transferred to step-down evening of 03/21 in view of worsening respiratory status  Follow-up blood culture x2 have been negative  MRSA screen in process  Pneumonia   Assessment & Plan    Present on admission  Development of right-sided loculated parapneumonic effusion  Much improved on high-flow oxygen  Continue IV cefepime for gram-negative coverage (Day 5) + IV vancomycin for possible MRSA/Gram-positive coverage(Day 3) + IV Flagyl for possible anaerobic coverage ( Day 3)  Patient has allergy to azithromycin -- severe rash  Decrease IV fluids, supportive care, maintain step-down status  Appreciate Pulmonary Medicine input  HIV test result in process  Acute respiratory failure with hypoxia [facility])   Assessment & Plan    Secondary severe bilateral pneumonia and parapneumonic effusion  Continue with scheduled nebulizations IV antibiotics and oxygen support  Appreciate input from Pulmonary Medicine/Critical Care  Acute kidney injury (HCC)resolved as of 3/23/2018   Assessment & Plan    Present on admission  Serum creatinine at baseline  Acute kidney has resolved  Iron deficiency anemia   Assessment & Plan    Hemoglobin low on presentation  MCV low  Iron panel reviewed  Continue ferrous sulfate once daily  Will transfuse if Hb less than 7 0             ______________________________________________________________________    SUBJECTIVE:   Patient seen and examined  Patient seen and examined  She appears a lot comfortable sitting in chair    Episode shortness of breath early in the morning  Continues to have nonproductive cough, difficult culture expectorating  Continues to be weak with easy fatigability  OBJECTIVE:     Vitals:   HR:  [68-96] 86  Resp:  [18-43] 25  BP: (114-146)/(61-76) 146/76  SpO2:  [88 %-95 %] 92 %  Temp (24hrs), Av °F (37 2 °C), Min:96 9 °F (36 1 °C), Max:101 2 °F (38 4 °C)  Current: Temperature: 98 3 °F (36 8 °C)    Intake/Output Summary (Last 24 hours) at 18 1219  Last data filed at 18 3537   Gross per 24 hour   Intake          2886 67 ml   Output              900 ml   Net          1986 67 ml     Physical Exam:   GENERAL: AAO x 3  HEENT: atraumatic, normocephalic  Oral mucosa moist, no icterus, pallor  PERRLA +  Neck supple, no JVD, no lymphadenopathy, no thryomegaly  CHEST: B/L breath sounds heard,B/L wheezing  CVS: S1, S2  No cyanosis/clubbing or edema  ABDOMEN: Soft/obese/NT/BS heard  NEUROLOGICAL: CN II -XI grossly intact  No focal motor or sensory deficits  No signs of meningeal irritation or cerebellar dysfunction  EXTREMITIES: No cyanosis/clubbing or edema      LABS:  Results for London Scott (MRN [id number]) as of 3/23/2018 12:20   3/23/2018 04:55   WBC 13 05 (H)   RBC 2 81 (L)   Hemoglobin 7 6 (L)   Hematocrit 22 3 (L)   MCV 79 (L)   MCH 27 0   MCHC 34 1   RDW 16 4 (H)   Platelets 578   MPV 9 9     Scheduled Meds:    Current Facility-Administered Medications:  acetaminophen 650 mg Oral Q6H PRN Ezella TAWNY Wong-SAMIR    albuterol 2 puff Inhalation Q4H PRN Pernell Leslie MD    albuterol 2 5 mg Nebulization Q6H PRN Ezella Marvin PA-C    ALPRAZolam 0 25 mg Oral TID PRN Juan Francisco Tito, CRNP    cefepime 2,000 mg Intravenous Q12H Kely Gee MD Last Rate: 2,000 mg (18 0521)   enoxaparin 40 mg Subcutaneous Q24H [address] Aashish Weinberg MD    ferrous sulfate 325 mg Oral Daily With Breakfast Kely Gee MD    guaiFENesin 600 mg Oral Q12H [address] Bentley Wong PA-C    levalbuterol 1 25 mg Nebulization TID GILBERTO Martini    metroNIDAZOLE 500 mg Intravenous Q8H Kiara Holley MD Last Rate: 500 mg (03/23/18 0932)   ondansetron 8 mg Intravenous Q6H PRN GILBERTO Walton Last Rate: 8 mg (03/20/18 2136)   sodium chloride 100 mL/hr Intravenous Continuous Tonny Call MD Last Rate: 100 mL/hr (03/22/18 1452)   sodium chloride 3 mL Nebulization Q6H PRN Tonny Call MD    vancomycin 15 mg/kg Intravenous Q12H Alek Marie MD Last Rate: 1,000 mg (03/23/18 1214)       Continuous Infusions:    sodium chloride 100 mL/hr Last Rate: 100 mL/hr (03/22/18 1452)       PRN Meds:    acetaminophen    albuterol    albuterol    ALPRAZolam    ondansetron    sodium chloride      VTE Prophylaxis: Enoxaparin subcutaneously  DISPOSITION: Pending medical stability  Continue step-down status  Time Spent for Care: 20 minutes   More than 50% of total time spent on counseling and coordination of care as described above  Family will be updated  Sundar Trivedi MD  HOSPITALIST SERVICES  3/23/2018    PLEASE NOTE:  This encounter was completed utilizing the [employer] Direct Speech Voice Recognition Software  Grammatical errors, random word insertions, pronoun errors and incomplete sentences are occasional consequences of the system due to software limitations, ambient noise and hardware issues  These may be missed by proof reading prior to affixing electronic signature  Any questions or concerns about the content, text or information contained within the body of this dictation should be directly addressed to the physician for clarification   Please do not hesitate to call me directly if you have any any questions or concerns

## 2018-03-23 NOTE — ASSESSMENT & PLAN NOTE
Secondary severe bilateral pneumonia and parapneumonic effusion  Continue with scheduled nebulizations IV antibiotics and oxygen support  Appreciate input from Pulmonary Medicine/Critical Care

## 2018-03-23 NOTE — ASSESSMENT & PLAN NOTE
Present on admission  Development of right-sided loculated parapneumonic effusion  Much improved on high-flow oxygen  Continue IV cefepime for gram-negative coverage (Day 5) + IV vancomycin for possible MRSA/Gram-positive coverage(Day 3) + IV Flagyl for possible anaerobic coverage ( Day 3)  Patient has allergy to azithromycin -- severe rash  Decrease IV fluids, supportive care, maintain step-down status  Appreciate Pulmonary Medicine input  HIV test result in process

## 2018-03-23 NOTE — NURSING NOTE
Pt c/o SOB, sats mid 80s, given nebulizer tx; minimal improvement  Per NP Spatzer, placing pt back on HFNC  Will continue to monitor

## 2018-03-24 ENCOUNTER — APPOINTMENT (INPATIENT)
Dept: RADIOLOGY | Facility: HOSPITAL | Age: 28
DRG: 871 | End: 2018-03-24
Payer: COMMERCIAL

## 2018-03-24 ENCOUNTER — APPOINTMENT (INPATIENT)
Dept: CT IMAGING | Facility: HOSPITAL | Age: 28
DRG: 871 | End: 2018-03-24
Payer: COMMERCIAL

## 2018-03-24 LAB
ANION GAP SERPL CALCULATED.3IONS-SCNC: 11 MMOL/L (ref 4–13)
BACTERIA BLD CULT: NORMAL
BACTERIA BLD CULT: NORMAL
BUN SERPL-MCNC: 10 MG/DL (ref 5–25)
CALCIUM SERPL-MCNC: 7.8 MG/DL (ref 8.3–10.1)
CHLORIDE SERPL-SCNC: 103 MMOL/L (ref 100–108)
CO2 SERPL-SCNC: 26 MMOL/L (ref 21–32)
CREAT SERPL-MCNC: 0.83 MG/DL (ref 0.6–1.3)
ERYTHROCYTE [DISTWIDTH] IN BLOOD BY AUTOMATED COUNT: 16.4 % (ref 11.6–15.1)
GFR SERPL CREATININE-BSD FRML MDRD: 96 ML/MIN/1.73SQ M
GLUCOSE SERPL-MCNC: 96 MG/DL (ref 65–140)
HCT VFR BLD AUTO: 26.3 % (ref 34.8–46.1)
HGB BLD-MCNC: 8.8 G/DL (ref 11.5–15.4)
MCH RBC QN AUTO: 26.4 PG (ref 26.8–34.3)
MCHC RBC AUTO-ENTMCNC: 33.5 G/DL (ref 31.4–37.4)
MCV RBC AUTO: 79 FL (ref 82–98)
PLATELET # BLD AUTO: 227 THOUSANDS/UL (ref 149–390)
PMV BLD AUTO: 9.4 FL (ref 8.9–12.7)
POTASSIUM SERPL-SCNC: 3.1 MMOL/L (ref 3.5–5.3)
RBC # BLD AUTO: 3.33 MILLION/UL (ref 3.81–5.12)
SODIUM SERPL-SCNC: 140 MMOL/L (ref 136–145)
WBC # BLD AUTO: 16.96 THOUSAND/UL (ref 4.31–10.16)

## 2018-03-24 PROCEDURE — 85027 COMPLETE CBC AUTOMATED: CPT | Performed by: INTERNAL MEDICINE

## 2018-03-24 PROCEDURE — 99233 SBSQ HOSP IP/OBS HIGH 50: CPT | Performed by: NURSE PRACTITIONER

## 2018-03-24 PROCEDURE — 99232 SBSQ HOSP IP/OBS MODERATE 35: CPT | Performed by: INTERNAL MEDICINE

## 2018-03-24 PROCEDURE — 99254 IP/OBS CNSLTJ NEW/EST MOD 60: CPT | Performed by: INTERNAL MEDICINE

## 2018-03-24 PROCEDURE — 94660 CPAP INITIATION&MGMT: CPT

## 2018-03-24 PROCEDURE — 80048 BASIC METABOLIC PNL TOTAL CA: CPT | Performed by: INTERNAL MEDICINE

## 2018-03-24 PROCEDURE — 71260 CT THORAX DX C+: CPT

## 2018-03-24 PROCEDURE — 94640 AIRWAY INHALATION TREATMENT: CPT

## 2018-03-24 PROCEDURE — 71046 X-RAY EXAM CHEST 2 VIEWS: CPT

## 2018-03-24 RX ORDER — POTASSIUM CHLORIDE 20MEQ/15ML
40 LIQUID (ML) ORAL DAILY
Status: DISCONTINUED | OUTPATIENT
Start: 2018-03-24 | End: 2018-03-25

## 2018-03-24 RX ORDER — PANTOPRAZOLE SODIUM 40 MG/1
40 INJECTION, POWDER, FOR SOLUTION INTRAVENOUS
Status: DISCONTINUED | OUTPATIENT
Start: 2018-03-25 | End: 2018-03-28 | Stop reason: HOSPADM

## 2018-03-24 RX ORDER — VANCOMYCIN HYDROCHLORIDE 1 G/200ML
15 INJECTION, SOLUTION INTRAVENOUS EVERY 12 HOURS
Status: COMPLETED | OUTPATIENT
Start: 2018-03-24 | End: 2018-03-26

## 2018-03-24 RX ORDER — LINEZOLID 2 MG/ML
600 INJECTION, SOLUTION INTRAVENOUS EVERY 12 HOURS
Status: DISCONTINUED | OUTPATIENT
Start: 2018-03-24 | End: 2018-03-28

## 2018-03-24 RX ADMIN — LEVALBUTEROL HYDROCHLORIDE 1.25 MG: 1.25 SOLUTION, CONCENTRATE RESPIRATORY (INHALATION) at 07:39

## 2018-03-24 RX ADMIN — LEVALBUTEROL HYDROCHLORIDE 1.25 MG: 1.25 SOLUTION, CONCENTRATE RESPIRATORY (INHALATION) at 12:41

## 2018-03-24 RX ADMIN — FERROUS SULFATE TAB 325 MG (65 MG ELEMENTAL FE) 325 MG: 325 (65 FE) TAB at 09:04

## 2018-03-24 RX ADMIN — ISODIUM CHLORIDE 3 ML: 0.03 SOLUTION RESPIRATORY (INHALATION) at 12:41

## 2018-03-24 RX ADMIN — VANCOMYCIN HYDROCHLORIDE 1000 MG: 1 INJECTION, SOLUTION INTRAVENOUS at 17:45

## 2018-03-24 RX ADMIN — IOHEXOL 85 ML: 350 INJECTION, SOLUTION INTRAVENOUS at 13:40

## 2018-03-24 RX ADMIN — LINEZOLID 600 MG: 600 INJECTION, SOLUTION INTRAVENOUS at 14:43

## 2018-03-24 RX ADMIN — ENOXAPARIN SODIUM 40 MG: 40 INJECTION SUBCUTANEOUS at 09:04

## 2018-03-24 RX ADMIN — METRONIDAZOLE 500 MG: 500 INJECTION, SOLUTION INTRAVENOUS at 17:47

## 2018-03-24 RX ADMIN — ONDANSETRON 8 MG: 2 INJECTION INTRAMUSCULAR; INTRAVENOUS at 10:53

## 2018-03-24 RX ADMIN — METRONIDAZOLE 500 MG: 500 INJECTION, SOLUTION INTRAVENOUS at 00:58

## 2018-03-24 RX ADMIN — ISODIUM CHLORIDE 3 ML: 0.03 SOLUTION RESPIRATORY (INHALATION) at 19:04

## 2018-03-24 RX ADMIN — CEFEPIME HYDROCHLORIDE 2000 MG: 2 INJECTION, SOLUTION INTRAVENOUS at 10:24

## 2018-03-24 RX ADMIN — ONDANSETRON 8 MG: 2 INJECTION INTRAMUSCULAR; INTRAVENOUS at 17:45

## 2018-03-24 RX ADMIN — ISODIUM CHLORIDE 3 ML: 0.03 SOLUTION RESPIRATORY (INHALATION) at 07:39

## 2018-03-24 RX ADMIN — LEVALBUTEROL HYDROCHLORIDE 1.25 MG: 1.25 SOLUTION, CONCENTRATE RESPIRATORY (INHALATION) at 19:04

## 2018-03-24 RX ADMIN — GUAIFENESIN 600 MG: 600 TABLET, EXTENDED RELEASE ORAL at 09:05

## 2018-03-24 RX ADMIN — POTASSIUM CHLORIDE 40 MEQ: 20 SOLUTION ORAL at 09:04

## 2018-03-24 RX ADMIN — CEFEPIME HYDROCHLORIDE 2000 MG: 2 INJECTION, SOLUTION INTRAVENOUS at 17:45

## 2018-03-24 RX ADMIN — METRONIDAZOLE 500 MG: 500 INJECTION, SOLUTION INTRAVENOUS at 09:04

## 2018-03-24 RX ADMIN — GUAIFENESIN 600 MG: 600 TABLET, EXTENDED RELEASE ORAL at 20:36

## 2018-03-24 RX ADMIN — ALPRAZOLAM 0.25 MG: 0.25 TABLET ORAL at 23:04

## 2018-03-24 NOTE — ASSESSMENT & PLAN NOTE
Present on admission  Very slow to respond  Multi lobar pneumonia more on the right side with right-sided loculated parapneumonic effusion  Continue IV cefepime for gram-negative coverage (Day 6/7) + IV Flagyl for possible anaerobic coverage ( Day 4/7)  Patient has allergy to azithromycin -- severe rash  Repeat chest x-ray and possible CT scan chest to rule out necrotizing pneumonia/abscess  Appreciate Pulmonary Medicine input  HIV screening is negative

## 2018-03-24 NOTE — PROGRESS NOTES
Ct chest done s/p worsening CXR  No s/s of necrotizing pna  Consult done with ID, abx therapy changed  Pt upset regarding slow progress, tearful  Refuses xanax, emotional support given

## 2018-03-24 NOTE — PROGRESS NOTES
Progress Note - Sanjeev Luna 1990, 29 y o  female MRN: [id number]    Unit/Bed#: ICU 09 Encounter: [id number]    Primary Care Provider: April Cerda DO   Date and time admitted to hospital: 3/19/2018  4:26 PM        * Sepsis [facility])   Assessment & Plan    PRESENT ON ADMISSION  Secondary to community-acquired pneumonia/ parapneumonic effusion  IV antibiotics coverage broadened with the addition of vancomycin and Zyvox  Patient was transferred to step-down evening of 03/21 in view of worsening respiratory status  Follow-up blood culture x2 have been negative  ID on board -- restarted vancomycin added linezolid  Patient not able to bring up sputum  Although MRSA screen negative -- ID recommends restarting this  Pneumonia   Assessment & Plan    Present on admission  Very slow to respond  Multi lobar pneumonia more on the right side with right-sided loculated parapneumonic effusion  Chest x-ray and CT chest from today reviewed  Continue IV cefepime for gram-negative coverage (Day 6/7) + IV Flagyl for possible anaerobic coverage (Day 4/7)  ID consulted, appreciate input -- vancomycin restarted for possible MRSA, and linezolid added  Patient has allergy to azithromycin -- severe rash  Appreciate Pulmonary Medicine + ID input  HIV screening is negative  Guarded prognosis  ?bronchoscopy  Acute respiratory failure with hypoxia [facility])   Assessment & Plan    Secondary severe bilateral pneumonia and parapneumonic effusion  Continue with scheduled nebulizations IV antibiotics and oxygen support  Appreciate input from Pulmonary Medicine/Critical Care  Repeat chest x-ray today and if needed CT chest         Iron deficiency anemia   Assessment & Plan    Hemoglobin low on presentation  MCV low  Iron panel reviewed  Continue ferrous sulfate once daily  Will transfuse if Hb less than 7 0  Hypokalemia  Potassium at 3 1  Replace with potassium chloride oral solution once daily x 3 days  __________________________________________________________________    SUBJECTIVE:   Patient seen and examined  Patient states she feels a lot better compared to last 3 days  Repeat CT chest from today: again seen is dense airspace consolidation in the lower lobes and patchy airspace consolidation throughout the rest of the lung  The upper lobe airspace disease has mildly worsened compared to 3/21/2018  There are small bilateral water density pleural effusions  There is no evidence of loculation or pleural enhancement to suggest empyema formation  Very slow to respond still on high-flow oxygen  Patient has been afebrile the last 24 hours  Respirations are still labored  Continue high-flow oxygen and titrate for pulse ox 90% or more  Continue with antibiotics as mentioned above  Continue step-down unit  Patient's mother updated  OBJECTIVE:     Vitals:   HR:  [68-98] 98  Resp:  [25-29] 26  BP: (118-132)/(61-79) 132/79  SpO2:  [90 %-96 %] 94 %  Temp (24hrs), Av °F (36 7 °C), Min:96 5 °F (35 8 °C), Max:99 2 °F (37 3 °C)  Current: Temperature: (!) 96 5 °F (35 8 °C)    Intake/Output Summary (Last 24 hours) at 18 1243  Last data filed at 18 1201   Gross per 24 hour   Intake          2493 33 ml   Output             1250 ml   Net          1243 33 ml       Physical Exam:   GENERAL: AAO x 3, pale, does not appear in distress  HEENT: atraumatic, normocephalic  Oral mucosa moist, no icterus, pallor  PERRLA +  Neck supple, no JVD, no lymphadenopathy, no thryomegaly  CHEST: B/L breath sounds heard, occasional wheezing  CVS: S1, S2  No cyanosis/clubbing or edema  ABDOMEN: Soft/obese/NT/BS heard  NEUROLOGICAL: CN II -XI grossly intact  No focal motor or sensory deficits  No signs of meningeal irritation or cerebellar dysfunction  EXTREMITIES:  Mild bilateral pitting pedal edema      LABS:  Results for Bucky Armas (MRN [id number]) as of 3/24/2018 12:38   3/24/2018 05:47   eGFR 96   Sodium 140 Potassium 3 1 (L)   Chloride 103   CO2 26   Anion Gap 11   BUN 10   Creatinine 0 83   Glucose 96   Calcium 7 8 (L)   WBC 16 96 (H)   RBC 3 33 (L)   Hemoglobin 8 8 (L)   Hematocrit 26 3 (L)   MCV 79 (L)   MCH 26 4 (L)   MCHC 33 5   RDW 16 4 (H)   Platelets 848   MPV 9 4      CT CHEST FROM 03/24:  Again seen is dense airspace consolidation in the lower lobes and patchy airspace consolidation throughout the rest of the lung  The upper lobe airspace disease has mildly worsened compared to 3/21/2018  There are small bilateral water density pleural effusions  There is no evidence of loculation or pleural enhancement to suggest empyema formation  Scheduled Meds:    Current Facility-Administered Medications:  acetaminophen 650 mg Oral Q6H PRN Mariana Crespo PA-C    albuterol 2 puff Inhalation Q4H PRN Ramila Lawton MD    albuterol 2 5 mg Nebulization Q6H PRN Mariana Crespo PA-C    ALPRAZolam 0 25 mg Oral TID PRN GILBERTO oRsenthal    cefepime 2,000 mg Intravenous Q12H Lurdes Vargas MD Last Rate: Stopped (03/24/18 1054)   enoxaparin 40 mg Subcutaneous Q24H [address] Caroline Murray MD    ferrous sulfate 325 mg Oral Daily With Breakfast Lurdes Vargas MD    guaiFENesin 600 mg Oral Q12H [address] Mariana Crespo PA-C    levalbuterol 1 25 mg Nebulization TID GILBERTO Luther    linezolid 600 mg Intravenous Q12H GILBERTO De Paz    metroNIDAZOLE 500 mg Intravenous Q8H Caroline Murray MD Last Rate: Stopped (03/24/18 0934)   ondansetron 8 mg Intravenous Q6H PRN GILBERTO Rosenthal Last Rate: Stopped (03/24/18 1108)   potassium chloride 40 mEq Oral Daily Tonny Ramirez MD    sodium chloride 3 mL Nebulization Q6H PRN Lurdes Vargas MD    vancomycin 15 mg/kg (Adjusted) Intravenous Q12H GILBERTO De Paz           PRN Meds:    acetaminophen    albuterol    albuterol    ALPRAZolam    ondansetron    sodium chloride      VTE Prophylaxis:  Enoxaparin subcutaneously      DISPOSITION:  Condition is guarded  Continue step-down status  Patient's mother updated of the events  Time Spent for Care: 20 minutes   More than 50% of total time spent on counseling and coordination of care as described above  Mother updated of the events of today  Of today  [address], MD  HOSPITALIST SERVICES  3/24/2018    PLEASE NOTE:  This encounter was completed utilizing the [employer] Direct Speech Voice Recognition Software  Grammatical errors, random word insertions, pronoun errors and incomplete sentences are occasional consequences of the system due to software limitations, ambient noise and hardware issues  These may be missed by proof reading prior to affixing electronic signature  Any questions or concerns about the content, text or information contained within the body of this dictation should be directly addressed to the physician for clarification   Please do not hesitate to call me directly if you have any any questions or concerns

## 2018-03-24 NOTE — ASSESSMENT & PLAN NOTE
Secondary severe bilateral pneumonia and parapneumonic effusion  Continue with scheduled nebulizations IV antibiotics and oxygen support  Appreciate input from Pulmonary Medicine/Critical Care    Repeat chest x-ray today and if needed CT chest

## 2018-03-24 NOTE — ASSESSMENT & PLAN NOTE
Hemoglobin low on presentation  MCV low  Iron panel reviewed  Continue ferrous sulfate once daily  Will transfuse if Hb less than 7 0

## 2018-03-24 NOTE — PROGRESS NOTES
Progress Note - Pulmonary   Edda Mark 29 y o  female MRN: [id number]  Unit/Bed#: ICU 09 Encounter: [id number]    Assessment/Plan:  1  Acute hypoxic respiratory failure secondary to multilobar community-acquired pneumonia  · Slowly improving however patient still having desaturation with exertion  Was on 6 L nasal cannula the majority of the day yesterday then developed some mild increased work of breathing was transition to high-flow nasal cannula which was continued overnight  Weaned back to nasal cannula with goal to maintain oxygen saturation greater than 90%  · Continue aggressive pulmonary toileting  Encourage cough and deep breathing  Use incentive spirometer  Get out of bed during the day  Increase activity and ambulation as tolerated  2  Sepsis secondary to multilobar community-acquired pneumonia  · Patient remains afebrile  She does not appear systemically toxic  Low suspicion for aspiration  Would consider discontinuing Flagyl  Continue cefepime for 1 more day to complete 7 days of total antibiotic therapy  3  Small right parapneumonic effusion  · No indication for thoracentesis currently     ----------------------------------------------------------------------------------------------------------------------    HPI/Interval History:   Afebrile  On HFNC overnight  No acute events overnight  Vitals:   Blood pressure 118/62, pulse 68, temperature 98 4 °F (36 9 °C), temperature source Temporal, resp  rate (!) 28, height 5' 3" (1 6 m), weight 71 8 kg (158 lb 3 2 oz), SpO2 95 %  ,Body mass index is 28 02 kg/m²    SpO2: 95 %  SpO2 Activity: At Rest  O2 Device: Nasal cannula    Physical Exam:   Gen:  Sleeping but easily arousable, appropriate, no acute distress  HEENT:  Atraumatic, normocephalic, extraocular movements intact, pupils 3 mm equal and reactive, oropharynx clear  Neck:  Supple, trachea midline, no JVD, lymphadenopathy  Chest:  Clear to auscultation bilaterally, no wheeze, rales, rhonchi, respirations even and nonlabored  Cardiac:  Single S1/S2, no murmurs, rubs, gallops, regular rate and rhythm  Abdomen:  Soft, nontender, nondistended, bowel sounds normoactive  Extremities:  No edema, no clubbing or cyanosis      Labs:    CBC:    Results from last 7 days  Lab Units 03/23/18  0455   WBC Thousand/uL 13 05*   HEMOGLOBIN g/dL 7 6*   HEMATOCRIT % 22 3*   PLATELETS Thousands/uL 211         BMP:     Results from last 7 days  Lab Units 03/22/18  0442   SODIUM mmol/L 140   POTASSIUM mmol/L 3 9   CHLORIDE mmol/L 108   CO2 mmol/L 21   BUN mg/dL 16   CREATININE mg/dL 0 92   GLUCOSE RANDOM mg/dL 143*   CALCIUM mg/dL 8 6           Imaging studies:  None GILBERTO Hernandez

## 2018-03-24 NOTE — ASSESSMENT & PLAN NOTE
PRESENT ON ADMISSION  Secondary to community-acquired pneumonia/ parapneumonic effusion  Aggressive IV fluids and IV antibiotics coverage broadened  Patient transferred to step-down evening of 03/21 in view of worsening respiratory status  Follow-up blood culture x2 have been negative  MRSA screen is negative

## 2018-03-24 NOTE — CONSULTS
Consultation - Infectious Disease   Christopher Deutsch 29 y o  female MRN: [id number]  Unit/Bed#: ICU 09 Encounter: [id number]      IMPRESSION & RECOMMENDATIONS:   Impression/Recommendations: This is a 29 y o  female, otherwise healthy, admitted 5 days ago with community acquire multilobar pneumonia  Despite being on broad-spectrum antibiotic, patient is only marginally improved clinically  She remains on O2 support via high-flow nasal cannula  1   Multi lobar pneumonia, marginally improved clinically  Given influenza like symptoms the week prior to acute infection, together with lack of influenza vaccination, I am concerned about the possibility of post influenza pneumonia in this patient  Influenza PCR is negative, which would be likely if patient already recovered from her influenza infection  Given severity of pneumonia with this background, community-acquired MRSA infection is possible  Given lack of improvement with current antibiotic regimen, including vancomycin, it would be worthwhile to give patient a trial of linezolid  Unfortunately, with patient unable to produce sputum, we have no culture guidance  Given her tenuous respiratory status, bronchoscopy will most likely to mechanical ventilation requirement  Therefore, it is reasonable to hold off for now  Continue vancomycin/cefepime/Flagyl for now  Add linezolid  Monitor respiratory symptoms closely  Monitor temperature/WBC  Plan for repeat chest CT noted  If patient ends up be on ventilator, bronchoscopy would be helpful  2   Sepsis, POA  This is secondary to pneumonia above  Patient is systemically improved  Blood cultures remain negative  Antibiotic plan as in above  Monitor hemodynamics  Follow-up on pending blood cultures  3  Acute hypoxic respiratory failure  This is secondary to severe pneumonia above  Patient's respiratory status remains tenuous but relatively stable  She is on high-flow O2 support    As stated above, given her tenuous respiratory status, it is reasonable to hold off on bronchoscopy for now  O2 support per primary service  Monitor respiratory symptoms  4   Small pleural effusions  On prior CT, id effusion appears to be small on free-flowing, unlikely to be empyema  Plan for repeat chest CT noted  If diffusion is larger or more loculated, patient may need thoracentesis  Monitor pleural effusion on repeat CT  5   Persistent leukocytosis  This may be secondary to persistent pneumonia above  Patient did receive 1 dose of corticosteroid previously  Persistent leukocytosis may be secondary to it  Monitor WBC  Hospitalization records reviewed in detail  Discussed with the patient and her parents in detail  Discussed with Dr Marlene Pop from [facility]  Thank you for this consultation  We will follow along with you  HISTORY OF PRESENT ILLNESS:  Reason for Consult:  Multi lobar pneumonia, slow to respond  HPI: Amy Greenberg is a 29 y o  female, otherwise healthy, came to the ER on 03/19 with 2 day history of fever, dyspnea, cough with nausea, vomiting and diarrhea  CXR with multilobar pneumonia  Patient was admitted and placed on IV cefepime  Patient deteriorated and was transferred to ICU on 03/21  Vancomycin was added  Flagyl was also added for possible aspiration  Chest CT was done, showing severe consolidations involving both lungs  Patient was also placed on high-flow O2 to support her hypoxic respiratory failure  Patient is only marginally improved  For these reasons, we are asked to evaluate the patient  Of note, vancomycin was briefly discontinue and was restarted  Patient did not receive influenza vaccination  In fact, she states she has never received influenza vaccine  Patient states for the week prior to her acute illness, she has been having fatigue and myalgia    In addition, a co-worker had pneumonia, requiring hospitalization a few days prior to her illness  Patient denies prior pneumonia  At present, her cough is mostly dry  No sputum was able to be obtained  Influenza PCR negative  HIV negative  Legionella antigen negative  REVIEW OF SYSTEMS:  A complete 12 point system-based review of systems is otherwise negative  PAST MEDICAL HISTORY:  Past Medical History:   Diagnosis Date    Depressed      Past Surgical History:   Procedure Laterality Date    BREAST LUMPECTOMY Right      Problem list reviewed  FAMILY HISTORY:  Non-contributory    SOCIAL HISTORY:  History   Alcohol Use    Yes     Comment: social     History   Drug Use No     History   Smoking Status    Never Smoker   Smokeless Tobacco    Never Used       ALLERGIES:  Allergies   Allergen Reactions    Prednisone Hives    Penicillins Hives and Rash    Zithromax [Azithromycin] Hives and Rash       MEDICATIONS:  All current active medications have been reviewed  Patient is currently on vancomycin/cefepime/Flagyl  PHYSICAL EXAM:  Vitals:  HR:  [68-98] 70  Resp:  [25-29] 25  BP: (118-141)/(61-81) 141/81  SpO2:  [90 %-96 %] 94 %  Temp (24hrs), Av 2 °F (36 8 °C), Min:96 5 °F (35 8 °C), Max:99 2 °F (37 3 °C)  Current: Temperature: 98 9 °F (37 2 °C)     Physical Exam:  General:  Acutely ill-appearing, dyspnea but nontoxic, in no acute distress  Awake, alert and oriented x 3  Eyes:  Conjunctive clear with no hemorrhages or effusions  Oropharynx:  No ulcers, no lesions, pharynx benign, no tonsillitis  Neck:  Supple, no lymphadenopathy, no mass, nontender  Lungs:  Expansion symmetric, bilateral rales 1/2 up both lung fields, no wheezing, no accessory muscle use  Cardiac:  Regular rate and rhythm, normal S1, normal S2, no murmurs  Abdomen:  Soft, nondistended, non-tender, no HSM  Extremities:  No edema, no erythema, nontender   No ulcers  Skin:  No rashes, no ulcers  Neurological:  Moves all four extremities spontaneously, sensation grossly intact    LABS, IMAGING, & OTHER STUDIES:  Lab Results:  I have personally reviewed pertinent labs  Results from last 7 days  Lab Units 03/24/18  0547 03/22/18  0442 03/21/18  0532  03/19/18  1705   SODIUM mmol/L 140 140 140  < > 135*   POTASSIUM mmol/L 3 1* 3 9 3 0*  < > 3 9   CHLORIDE mmol/L 103 108 108  < > 98*   CO2 mmol/L 26 21 20*  < > 23   ANION GAP mmol/L 11 11 12  < > 14*   BUN mg/dL 10 16 16  < > 42*   CREATININE mg/dL 0 83 0 92 1 08  < > 2 02*   EGFR ml/min/1 73sq m 96 85 70  < > 33   GLUCOSE RANDOM mg/dL 96 143* 107  < > 125   CALCIUM mg/dL 7 8* 8 6 7 8*  < > 8 2*   AST U/L  --   --   --   --  27   ALT U/L  --   --   --   --  18   ALK PHOS U/L  --   --   --   --  60   TOTAL PROTEIN g/dL  --   --   --   --  6 8   BILIRUBIN TOTAL mg/dL  --   --   --   --  0 90   < > = values in this interval not displayed  Results from last 7 days  Lab Units 03/24/18  0547 03/23/18  0455 03/22/18  0442   WBC Thousand/uL 16 96* 13 05* 13 92*   HEMOGLOBIN g/dL 8 8* 7 6* 9 5*   PLATELETS Thousands/uL 227 211 220       Results from last 7 days  Lab Units 03/21/18  1410 03/20/18  1237 03/19/18  1849 03/19/18  1705   BLOOD CULTURE   --   --   --  No Growth After 4 Days  No Growth After 4 Days  MRSA CULTURE ONLY  No Methicillin Resistant Staphlyococcus aureus (MRSA) isolated  --   --   --    INFLUENZA A PCR   --   --  None Detected  --    INFLUENZA B PCR   --   --  None Detected  --    RSV PCR   --   --  None Detected  --    LEGIONELLA URINARY ANTIGEN   --  Negative  --   --        Imaging Studies:   I have personally reviewed pertinent imaging study reports and images in PACS   3/21 chest CT reviewed personally  Multi lobar pneumonia involving the whole right lung and left lower lung  There are small bilateral pleural effusions, R>L  Admission CXR reviewed personally  There is multi lobar pneumonia involving both sides of lung  EKG, Pathology, and Other Studies:   I have personally reviewed pertinent reports

## 2018-03-25 PROBLEM — E87.6 HYPOKALEMIA: Status: ACTIVE | Noted: 2018-03-25

## 2018-03-25 LAB
ANION GAP SERPL CALCULATED.3IONS-SCNC: 9 MMOL/L (ref 4–13)
BUN SERPL-MCNC: 9 MG/DL (ref 5–25)
CALCIUM SERPL-MCNC: 8.2 MG/DL (ref 8.3–10.1)
CHLORIDE SERPL-SCNC: 102 MMOL/L (ref 100–108)
CO2 SERPL-SCNC: 28 MMOL/L (ref 21–32)
CREAT SERPL-MCNC: 0.87 MG/DL (ref 0.6–1.3)
ERYTHROCYTE [DISTWIDTH] IN BLOOD BY AUTOMATED COUNT: 16.4 % (ref 11.6–15.1)
GFR SERPL CREATININE-BSD FRML MDRD: 91 ML/MIN/1.73SQ M
GLUCOSE SERPL-MCNC: 112 MG/DL (ref 65–140)
HCT VFR BLD AUTO: 27.6 % (ref 34.8–46.1)
HGB BLD-MCNC: 9.2 G/DL (ref 11.5–15.4)
MCH RBC QN AUTO: 26.8 PG (ref 26.8–34.3)
MCHC RBC AUTO-ENTMCNC: 33.3 G/DL (ref 31.4–37.4)
MCV RBC AUTO: 81 FL (ref 82–98)
PLATELET # BLD AUTO: 201 THOUSANDS/UL (ref 149–390)
PMV BLD AUTO: 10.9 FL (ref 8.9–12.7)
POTASSIUM SERPL-SCNC: 3.2 MMOL/L (ref 3.5–5.3)
RBC # BLD AUTO: 3.43 MILLION/UL (ref 3.81–5.12)
SODIUM SERPL-SCNC: 139 MMOL/L (ref 136–145)
WBC # BLD AUTO: 18.8 THOUSAND/UL (ref 4.31–10.16)

## 2018-03-25 PROCEDURE — 99232 SBSQ HOSP IP/OBS MODERATE 35: CPT | Performed by: INTERNAL MEDICINE

## 2018-03-25 PROCEDURE — 99233 SBSQ HOSP IP/OBS HIGH 50: CPT | Performed by: INTERNAL MEDICINE

## 2018-03-25 PROCEDURE — 85027 COMPLETE CBC AUTOMATED: CPT | Performed by: INTERNAL MEDICINE

## 2018-03-25 PROCEDURE — C9113 INJ PANTOPRAZOLE SODIUM, VIA: HCPCS | Performed by: NURSE PRACTITIONER

## 2018-03-25 PROCEDURE — 99233 SBSQ HOSP IP/OBS HIGH 50: CPT | Performed by: NURSE PRACTITIONER

## 2018-03-25 PROCEDURE — 80048 BASIC METABOLIC PNL TOTAL CA: CPT | Performed by: INTERNAL MEDICINE

## 2018-03-25 PROCEDURE — 94640 AIRWAY INHALATION TREATMENT: CPT

## 2018-03-25 PROCEDURE — 94660 CPAP INITIATION&MGMT: CPT

## 2018-03-25 RX ORDER — POTASSIUM CHLORIDE 20 MEQ/1
40 TABLET, EXTENDED RELEASE ORAL ONCE
Status: COMPLETED | OUTPATIENT
Start: 2018-03-25 | End: 2018-03-25

## 2018-03-25 RX ORDER — SACCHAROMYCES BOULARDII 250 MG
250 CAPSULE ORAL 2 TIMES DAILY
Status: DISCONTINUED | OUTPATIENT
Start: 2018-03-25 | End: 2018-03-28 | Stop reason: HOSPADM

## 2018-03-25 RX ADMIN — ISODIUM CHLORIDE 3 ML: 0.03 SOLUTION RESPIRATORY (INHALATION) at 13:34

## 2018-03-25 RX ADMIN — Medication 250 MG: at 17:29

## 2018-03-25 RX ADMIN — GUAIFENESIN 600 MG: 600 TABLET, EXTENDED RELEASE ORAL at 08:53

## 2018-03-25 RX ADMIN — ALPRAZOLAM 0.25 MG: 0.25 TABLET ORAL at 22:24

## 2018-03-25 RX ADMIN — FERROUS SULFATE TAB 325 MG (65 MG ELEMENTAL FE) 325 MG: 325 (65 FE) TAB at 08:52

## 2018-03-25 RX ADMIN — VANCOMYCIN HYDROCHLORIDE 1000 MG: 1 INJECTION, SOLUTION INTRAVENOUS at 14:59

## 2018-03-25 RX ADMIN — METRONIDAZOLE 500 MG: 500 INJECTION, SOLUTION INTRAVENOUS at 02:13

## 2018-03-25 RX ADMIN — METRONIDAZOLE 500 MG: 500 INJECTION, SOLUTION INTRAVENOUS at 08:50

## 2018-03-25 RX ADMIN — ISODIUM CHLORIDE 3 ML: 0.03 SOLUTION RESPIRATORY (INHALATION) at 19:02

## 2018-03-25 RX ADMIN — VANCOMYCIN HYDROCHLORIDE 1000 MG: 1 INJECTION, SOLUTION INTRAVENOUS at 02:13

## 2018-03-25 RX ADMIN — LEVALBUTEROL HYDROCHLORIDE 1.25 MG: 1.25 SOLUTION, CONCENTRATE RESPIRATORY (INHALATION) at 07:01

## 2018-03-25 RX ADMIN — ENOXAPARIN SODIUM 40 MG: 40 INJECTION SUBCUTANEOUS at 08:54

## 2018-03-25 RX ADMIN — LEVALBUTEROL HYDROCHLORIDE 1.25 MG: 1.25 SOLUTION, CONCENTRATE RESPIRATORY (INHALATION) at 19:02

## 2018-03-25 RX ADMIN — CEFEPIME HYDROCHLORIDE 2000 MG: 2 INJECTION, SOLUTION INTRAVENOUS at 05:22

## 2018-03-25 RX ADMIN — METRONIDAZOLE 500 MG: 500 INJECTION, SOLUTION INTRAVENOUS at 18:49

## 2018-03-25 RX ADMIN — CEFEPIME HYDROCHLORIDE 2000 MG: 2 INJECTION, SOLUTION INTRAVENOUS at 18:06

## 2018-03-25 RX ADMIN — POTASSIUM CHLORIDE 40 MEQ: 1500 TABLET, EXTENDED RELEASE ORAL at 09:25

## 2018-03-25 RX ADMIN — PANTOPRAZOLE SODIUM 40 MG: 40 INJECTION, POWDER, FOR SOLUTION INTRAVENOUS at 08:53

## 2018-03-25 RX ADMIN — LINEZOLID 600 MG: 600 INJECTION, SOLUTION INTRAVENOUS at 16:52

## 2018-03-25 RX ADMIN — ISODIUM CHLORIDE 3 ML: 0.03 SOLUTION RESPIRATORY (INHALATION) at 07:01

## 2018-03-25 RX ADMIN — LEVALBUTEROL HYDROCHLORIDE 1.25 MG: 1.25 SOLUTION, CONCENTRATE RESPIRATORY (INHALATION) at 13:34

## 2018-03-25 RX ADMIN — LINEZOLID 600 MG: 600 INJECTION, SOLUTION INTRAVENOUS at 02:13

## 2018-03-25 RX ADMIN — GUAIFENESIN 600 MG: 600 TABLET, EXTENDED RELEASE ORAL at 20:08

## 2018-03-25 NOTE — ASSESSMENT & PLAN NOTE
Secondary severe bilateral pneumonia and parapneumonic effusion  Continue with scheduled nebulizations IV antibiotics and oxygen support  Appreciate input from Pulmonary Medicine/Critical Care and Infectious Diseases  Repeat CT scan chest and chest x-ray from 03/24 reviewed

## 2018-03-25 NOTE — PROGRESS NOTES
Progress Note - Anaya Leon 1990, 29 y o  female MRN: [id number]    Unit/Bed#: ICU 09 Encounter: [id number]    Primary Care Provider: Mandy Corral DO   Date and time admitted to hospital: 3/19/2018  4:26 PM        * Sepsis [facility])   Assessment & Plan    PRESENT ON ADMISSION  Secondary to community-acquired pneumonia/ parapneumonic effusion  Aggressive IV fluids and IV antibiotics coverage broadened  Patient transferred to step-down evening of 03/21 in view of worsening respiratory status  Follow-up blood culture x2 have been negative  MRSA screen is negative  Pneumonia   Assessment & Plan    Present on admission  Very slow to respond  Multi lobar pneumonia more on the right side with right-sided loculated parapneumonic effusion  Chest x-ray and CT chest from 03/24 reviewed  Continue IV cefepime for gram-negative coverage (Day 7) + IV Flagyl for possible anaerobic coverage (Day 5) + IV Vancomycin (Day 6) for possible MRSA, and linezolid (Day 2)  Infectious Diseases and Critical Care Medicine actively following patient  Patient has allergy to azithromycin -- severe rash  No history of aspergillosis-- confirm by critical care team   HIV screening is negative  Guarded prognosis  ? Bronchoscopy if not improvement -- will continue recommendations by Pulmonary Medicine/Critical Team as they follow along  Patient's mother updated for these events  Small right parapneumonic effusion that has slightly improved in recent CT scan from yesterday  No evidence of empyema  Thoracentesis not warranted for now  Acute respiratory failure with hypoxia [facility])   Assessment & Plan    Secondary severe bilateral pneumonia and parapneumonic effusion  Continue with scheduled nebulizations IV antibiotics and oxygen support  Appreciate input from Pulmonary Medicine/Critical Care and Infectious Diseases  Repeat CT scan chest and chest x-ray from 03/24 reviewed       Iron deficiency anemia   Assessment & Plan Hemoglobin low on presentation  MCV low  Iron panel reviewed  Continue ferrous sulfate once daily  Will transfuse if Hb less than 7 0   Hypokalemia   Assessment & Plan    Replace with oral potassium supplement  Recheck BMP in a m  SUBJECTIVE:   Patient seen and examined  She appears a lot comfortable lying down  She still requiring high-flow nasal oxygen  No acute events overnight  Continues to have nonproductive cough, difficultly expectorating  Continues to be weak with easy fatigability      OBJECTIVE:   Vitals:   Vitals:    03/25/18 1200   BP:    Pulse: 86   Resp: (!) 29   Temp:    SpO2: (!) 89%       Intake/Output Summary (Last 24 hours) at 03/25/18 1244  Last data filed at 03/25/18 1228   Gross per 24 hour   Intake             1170 ml   Output             5000 ml   Net            -3830 ml       Physical Exam:   GENERAL: AAO x 3  HEENT: atraumatic, normocephalic  Oral mucosa moist, no icterus, pallor  PERRLA +  Neck supple, no JVD, no lymphadenopathy, no thryomegaly  CHEST: B/L breath sounds heard,B/L wheezing  CVS: S1, S2  No cyanosis/clubbing or edema  ABDOMEN: Soft/obese/NT/BS heard  NEUROLOGICAL: CN II -XI grossly intact  No focal motor or sensory deficits  No signs of meningeal irritation or cerebellar dysfunction  EXTREMITIES: No cyanosis/clubbing or edema      LABS:  Results for Olu Pierce (MRN [id number]) as of 3/25/2018 12:35   3/25/2018 06:45 3/25/2018 09:00   eGFR  91   Sodium  139   Potassium  3 2 (L)   Chloride  102   CO2  28   Anion Gap  9   BUN  9   Creatinine  0 87   Glucose  112   Calcium  8 2 (L)   WBC 18 80 (H)    RBC 3 43 (L)    Hemoglobin 9 2 (L)    MCV 81 (L)    MCH 26 8    MCHC 33 3    RDW 16 4 (H)    Platelets 410    MPV 10 9            VTE Prophylaxis: Enoxaparin subcutaneously      DISPOSITION: Pending medical stability   Continue step-down status      Time Spent for Care: 20 minutes   More than 50% of total time spent on counseling and coordination of care as described above  Family will be updated      Rabia Anguiano MD  HOSPITALIST SERVICES  3/23/2018     PLEASE NOTE:  This encounter was completed utilizing the [employer] Direct Speech Voice Recognition Software  Grammatical errors, random word insertions, pronoun errors and incomplete sentences are occasional consequences of the system due to software limitations, ambient noise and hardware issues  These may be missed by proof reading prior to affixing electronic signature  Any questions or concerns about the content, text or information contained within the body of this dictation should be directly addressed to the physician for clarification   Please do not hesitate to call me directly if you have any any questions or concerns

## 2018-03-25 NOTE — ASSESSMENT & PLAN NOTE
Multilobar pneumonia with a right-sided loculated parapneumonic effusion that was present on admission  Critical care and ID following  Possible post influenzae pneumonia  Pt is slowly improving  Continue current IV antibiotics  HIV screening is negative  Wbc is decreasing

## 2018-03-25 NOTE — PROGRESS NOTES
Progress Note - Infectious Disease   Jorden Asutin 29 y o  female MRN: [id number]  Unit/Bed#: ICU 09 Encounter: [id number]      Impression/Recommendations:  1  Multi lobar pneumonia, marginally improved clinically  Given influenza like symptoms the week prior to acute infection, together with lack of influenza vaccination, I am concerned about the possibility of post influenza pneumonia in this patient  Influenza PCR is negative, which would be likely if patient already recovered from her influenza infection  Given severity of pneumonia with this background, community-acquired MRSA infection is possible  linezolid was added to prior antibiotic regimen  Patient is overall stable clinically  Repeat chest CT findings also stable  Continue vancomycin/cefepime/Flagyl for now  Continue linezolid  Monitor respiratory symptoms closely  Monitor temperature/WBC      2  Sepsis, POA  This is secondary to pneumonia above  Patient is systemically improved  Blood cultures remain negative  Antibiotic plan as in above  Monitor hemodynamics  Follow-up on pending blood cultures      3  Acute hypoxic respiratory failure  This is secondary to severe pneumonia above  Patient's respiratory status remains tenuous but relatively stable  She is on high-flow O2 support  As stated above, given her tenuous respiratory status, it is reasonable to hold off on bronchoscopy for now  O2 support per primary service  Monitor respiratory symptoms      4   Small pleural effusions  On prior CT, id effusion appears to be small on free-flowing, unlikely to be empyema  repeat CT also showed small effusion, without loculation  Empyema unlikely     Monitor for now      5   Persistent leukocytosis  This may be secondary to persistent pneumonia above  Patient did receive 1 dose of corticosteroid previously  Persistent leukocytosis may be secondary to it  WBC slightly elevated today    Monitor WBC closely      Discussed with the patient in detail regarding the above plan  Discussed with Dr John Barahona from [address] [facility]  Antibiotics:  Zyvox # 2  Vancomycin/cefepime/Flagyl (Antibiotic # 6)    Subjective:  Patient remains in ICU  She remains on high-flow O2  Dyspnea slowly improving  Cough mostly dry  Temperature stays down  No chills  She is tolerating antibiotics well  No nausea, vomiting or diarrhea  Objective:  Vitals:  HR:  [66-90] 86  Resp:  [18-29] 29  BP: (118-144)/(64-78) 118/64  SpO2:  [89 %-97 %] 89 %  Temp (24hrs), Av °F (36 1 °C), Min:96 4 °F (35 8 °C), Max:97 8 °F (36 6 °C)  Current: Temperature: (!) 97 4 °F (36 3 °C)    Physical Exam:     General: Awake, alert, cooperative, no distress  Lungs: Expansion symmetric, no rales, no wheezing, respirations unlabored  Heart[de-identified]  Regular rate and rhythm, S1 and S2 normal, no murmur  Abdomen: Soft, nondistended, non-tender, bowel sounds active all four quadrants,        no masses, no organomegaly  Extremities: No edema  No erythema/warmth  No ulcer  Nontender to palpation  Skin:  No rash  Invasive Devices     Peripheral Intravenous Line            Peripheral IV 18 Left Antecubital 2 days                Labs studies:   I have personally reviewed pertinent labs      Results from last 7 days  Lab Units 18  0900 18  0547 18  0442  18  1705   SODIUM mmol/L 139 140 140  < > 135*   POTASSIUM mmol/L 3 2* 3 1* 3 9  < > 3 9   CHLORIDE mmol/L 102 103 108  < > 98*   CO2 mmol/L 28 26 21  < > 23   ANION GAP mmol/L 9 11 11  < > 14*   BUN mg/dL 9 10 16  < > 42*   CREATININE mg/dL 0 87 0 83 0 92  < > 2 02*   EGFR ml/min/1 73sq m 91 96 85  < > 33   GLUCOSE RANDOM mg/dL 112 96 143*  < > 125   CALCIUM mg/dL 8 2* 7 8* 8 6  < > 8 2*   AST U/L  --   --   --   --  27   ALT U/L  --   --   --   --  18   ALK PHOS U/L  --   --   --   --  60   TOTAL PROTEIN g/dL  --   --   --   --  6 8   BILIRUBIN TOTAL mg/dL  --   --   --   --  0 90   < > = values in this interval not displayed  Results from last 7 days  Lab Units 03/25/18  0645 03/24/18  0547 03/23/18  0455   WBC Thousand/uL 18 80* 16 96* 13 05*   HEMOGLOBIN g/dL 9 2* 8 8* 7 6*   PLATELETS Thousands/uL 201 227 211       Results from last 7 days  Lab Units 03/21/18  1410 03/20/18  1237 03/19/18  1849 03/19/18  1705   BLOOD CULTURE   --   --   --  No Growth After 5 Days  No Growth After 5 Days  MRSA CULTURE ONLY  No Methicillin Resistant Staphlyococcus aureus (MRSA) isolated  --   --   --    INFLUENZA A PCR   --   --  None Detected  --    INFLUENZA B PCR   --   --  None Detected  --    RSV PCR   --   --  None Detected  --    LEGIONELLA URINARY ANTIGEN   --  Negative  --   --        Imaging Studies:   I have personally reviewed pertinent imaging study reports and images in PACS  Repeat chest CT reviewed personally  Stable multilobar pneumonia  Bilateral pleural effusions remain small, without loculation  EKG, Pathology, and Other Studies:   I have personally reviewed pertinent reports

## 2018-03-25 NOTE — ASSESSMENT & PLAN NOTE
Present on admission  Very slow to respond  Multi lobar pneumonia more on the right side with right-sided loculated parapneumonic effusion  Chest x-ray and CT chest from 02/24 reviewed  Continue IV cefepime for gram-negative coverage (Day 7) + IV Flagyl for possible anaerobic coverage (Day 5) + IV Vancomycin (Day 6) for possible MRSA, and linezolid (Day 2)  Infectious Diseases and Critical Care Medicine actively following patient  Patient has allergy to azithromycin -- severe rash  No history of aspergillosis-- confirm by critical care team   HIV screening is negative  Guarded prognosis  ? Bronchoscopy if not improvement -- will continue recommendations by Pulmonary Medicine/Critical Team as they follow along  Patient's mother updated for these events

## 2018-03-26 LAB
ANION GAP SERPL CALCULATED.3IONS-SCNC: 9 MMOL/L (ref 4–13)
BUN SERPL-MCNC: 6 MG/DL (ref 5–25)
CALCIUM SERPL-MCNC: 8.3 MG/DL (ref 8.3–10.1)
CHLORIDE SERPL-SCNC: 101 MMOL/L (ref 100–108)
CO2 SERPL-SCNC: 27 MMOL/L (ref 21–32)
CREAT SERPL-MCNC: 0.79 MG/DL (ref 0.6–1.3)
ERYTHROCYTE [DISTWIDTH] IN BLOOD BY AUTOMATED COUNT: 16.4 % (ref 11.6–15.1)
GFR SERPL CREATININE-BSD FRML MDRD: 102 ML/MIN/1.73SQ M
GLUCOSE SERPL-MCNC: 133 MG/DL (ref 65–140)
HCT VFR BLD AUTO: 26.9 % (ref 34.8–46.1)
HGB BLD-MCNC: 9 G/DL (ref 11.5–15.4)
MCH RBC QN AUTO: 26.9 PG (ref 26.8–34.3)
MCHC RBC AUTO-ENTMCNC: 33.5 G/DL (ref 31.4–37.4)
MCV RBC AUTO: 81 FL (ref 82–98)
PLATELET # BLD AUTO: 255 THOUSANDS/UL (ref 149–390)
PMV BLD AUTO: 10 FL (ref 8.9–12.7)
POTASSIUM SERPL-SCNC: 3.4 MMOL/L (ref 3.5–5.3)
RBC # BLD AUTO: 3.34 MILLION/UL (ref 3.81–5.12)
SODIUM SERPL-SCNC: 137 MMOL/L (ref 136–145)
WBC # BLD AUTO: 12.77 THOUSAND/UL (ref 4.31–10.16)

## 2018-03-26 PROCEDURE — 99232 SBSQ HOSP IP/OBS MODERATE 35: CPT | Performed by: INTERNAL MEDICINE

## 2018-03-26 PROCEDURE — 94660 CPAP INITIATION&MGMT: CPT

## 2018-03-26 PROCEDURE — 94640 AIRWAY INHALATION TREATMENT: CPT

## 2018-03-26 PROCEDURE — 85027 COMPLETE CBC AUTOMATED: CPT | Performed by: INTERNAL MEDICINE

## 2018-03-26 PROCEDURE — 80048 BASIC METABOLIC PNL TOTAL CA: CPT | Performed by: INTERNAL MEDICINE

## 2018-03-26 PROCEDURE — C9113 INJ PANTOPRAZOLE SODIUM, VIA: HCPCS | Performed by: NURSE PRACTITIONER

## 2018-03-26 PROCEDURE — 99233 SBSQ HOSP IP/OBS HIGH 50: CPT | Performed by: INTERNAL MEDICINE

## 2018-03-26 RX ORDER — POTASSIUM CHLORIDE 20 MEQ/1
40 TABLET, EXTENDED RELEASE ORAL ONCE
Status: COMPLETED | OUTPATIENT
Start: 2018-03-26 | End: 2018-03-26

## 2018-03-26 RX ADMIN — LEVALBUTEROL HYDROCHLORIDE 1.25 MG: 1.25 SOLUTION, CONCENTRATE RESPIRATORY (INHALATION) at 12:56

## 2018-03-26 RX ADMIN — METRONIDAZOLE 500 MG: 500 INJECTION, SOLUTION INTRAVENOUS at 01:06

## 2018-03-26 RX ADMIN — GUAIFENESIN 600 MG: 600 TABLET, EXTENDED RELEASE ORAL at 20:16

## 2018-03-26 RX ADMIN — LINEZOLID 600 MG: 600 INJECTION, SOLUTION INTRAVENOUS at 01:07

## 2018-03-26 RX ADMIN — CEFEPIME HYDROCHLORIDE 2000 MG: 2 INJECTION, SOLUTION INTRAVENOUS at 17:49

## 2018-03-26 RX ADMIN — FERROUS SULFATE TAB 325 MG (65 MG ELEMENTAL FE) 325 MG: 325 (65 FE) TAB at 09:02

## 2018-03-26 RX ADMIN — LEVALBUTEROL HYDROCHLORIDE 1.25 MG: 1.25 SOLUTION, CONCENTRATE RESPIRATORY (INHALATION) at 08:24

## 2018-03-26 RX ADMIN — PANTOPRAZOLE SODIUM 40 MG: 40 INJECTION, POWDER, FOR SOLUTION INTRAVENOUS at 09:01

## 2018-03-26 RX ADMIN — ALPRAZOLAM 0.25 MG: 0.25 TABLET ORAL at 22:16

## 2018-03-26 RX ADMIN — LINEZOLID 600 MG: 600 INJECTION, SOLUTION INTRAVENOUS at 15:14

## 2018-03-26 RX ADMIN — Medication 250 MG: at 18:36

## 2018-03-26 RX ADMIN — LEVALBUTEROL HYDROCHLORIDE 1.25 MG: 1.25 SOLUTION, CONCENTRATE RESPIRATORY (INHALATION) at 19:29

## 2018-03-26 RX ADMIN — ENOXAPARIN SODIUM 40 MG: 40 INJECTION SUBCUTANEOUS at 09:02

## 2018-03-26 RX ADMIN — VANCOMYCIN HYDROCHLORIDE 1000 MG: 1 INJECTION, SOLUTION INTRAVENOUS at 01:07

## 2018-03-26 RX ADMIN — ISODIUM CHLORIDE 3 ML: 0.03 SOLUTION RESPIRATORY (INHALATION) at 19:29

## 2018-03-26 RX ADMIN — CEFEPIME HYDROCHLORIDE 2000 MG: 2 INJECTION, SOLUTION INTRAVENOUS at 05:14

## 2018-03-26 RX ADMIN — Medication 250 MG: at 09:01

## 2018-03-26 RX ADMIN — GUAIFENESIN 600 MG: 600 TABLET, EXTENDED RELEASE ORAL at 09:02

## 2018-03-26 RX ADMIN — METRONIDAZOLE 500 MG: 500 INJECTION, SOLUTION INTRAVENOUS at 09:00

## 2018-03-26 RX ADMIN — VANCOMYCIN HYDROCHLORIDE 1000 MG: 1 INJECTION, SOLUTION INTRAVENOUS at 13:15

## 2018-03-26 RX ADMIN — ISODIUM CHLORIDE 3 ML: 0.03 SOLUTION RESPIRATORY (INHALATION) at 12:57

## 2018-03-26 RX ADMIN — ISODIUM CHLORIDE 3 ML: 0.03 SOLUTION RESPIRATORY (INHALATION) at 08:24

## 2018-03-26 RX ADMIN — POTASSIUM CHLORIDE 40 MEQ: 1500 TABLET, EXTENDED RELEASE ORAL at 09:02

## 2018-03-26 RX ADMIN — METRONIDAZOLE 500 MG: 500 INJECTION, SOLUTION INTRAVENOUS at 18:31

## 2018-03-26 NOTE — ASSESSMENT & PLAN NOTE
Secondary severe bilateral pneumonia and parapneumonic effusion  Treatment of this pneumonia is described above  Pt is currently on high flow  Wean oxygen as tolerated  Pt will try nc today to see if she can tolerate this

## 2018-03-26 NOTE — PROGRESS NOTES
Progress Note - Infectious Disease   Yaneth Oliveros 29 y o  female MRN: [id number]  Unit/Bed#: ICU 09 Encounter: [id number]      Impression/Recommendations:  1   Multi lobar pneumonia, marginally improved clinically   Given influenza like symptoms the week prior to acute infection, together with lack of influenza vaccination, I am concerned about the possibility of post influenza pneumonia in this patient  Valeri Boyd PCR is negative, which would be likely if patient already recovered from her influenza infection   Given severity of pneumonia with this background, community-acquired MRSA infection is possible    Linezolid was added to prior antibiotic regimen  Patient is now clinically much improved     Complete vancomycin/cefepime/Flagyl today  Continue linezolid x 7 days total, another 4 days  Monitor respiratory symptoms closely  Monitor temperature/WBC      2   Sepsis, POA   This is secondary to pneumonia above   Patient is systemically improved   Blood cultures remain negative  Antibiotic plan as in above  Monitor hemodynamics  Follow-up on pending blood cultures      3  Acute hypoxic respiratory failure   This is secondary to severe pneumonia above   Patient's respiratory status is much improved today  O2 has been weaned  No clinical signs of respiratory distress or failure  O2 support per primary service  Monitor respiratory symptoms      4   Small pleural effusions   On prior CT, effusion appears to be small on free-flowing, unlikely to be empyema    Repeat CT also showed small effusion, without loculation  Empyema unlikely     Monitor for now      5   Leukocytosis   This may be secondary to persistent pneumonia above   Patient did receive 1 dose of corticosteroid previously   Persistent leukocytosis may be secondary to it  WBC finally improving today  Monitor WBC closely      Discussed with the patient in detail regarding the above plan    Discussed with Dr Pat Bobo Service      Antibiotics:  Zyvox # 3  Vancomycin/cefepime/Flagyl (Antibiotic # 7)     Subjective:  Patient feels a lot better today  Dyspnea much improved  Cough stable  O2 has been weaned to regular NC  She remains in ICU  Temperature stays down  No chills  She is tolerating antibiotics well  No nausea, vomiting or diarrhea  Objective:  Vitals:  HR:  [] 92  Resp:  [18-29] 21  BP: (110-134)/(59-74) 110/59  SpO2:  [89 %-99 %] 94 %  Temp (24hrs), Av 2 °F (36 8 °C), Min:96 7 °F (35 9 °C), Max:99 2 °F (37 3 °C)  Current: Temperature: (!) 96 7 °F (35 9 °C)    Physical Exam:     General: Awake, alert, cooperative, no distress  Lungs: Expansion symmetric, improved bilateral rales, no wheezing, respirations unlabored  Heart[de-identified]  Mildly tachycardic with regular rhythm, S1 and S2 normal, no murmur  Abdomen: Soft, nondistended, non-tender, bowel sounds active all four quadrants,        no masses, no organomegaly  Extremities: Trace edema  No erythema/warmth  No ulcer  Nontender to palpation  Skin:  No rash  Invasive Devices     Peripheral Intravenous Line            Peripheral IV 18 Left Antecubital 3 days                Labs studies:   I have personally reviewed pertinent labs      Results from last 7 days  Lab Units 18  0520 18  0900 18  0547  18  1705   SODIUM mmol/L 137 139 140  < > 135*   POTASSIUM mmol/L 3 4* 3 2* 3 1*  < > 3 9   CHLORIDE mmol/L 101 102 103  < > 98*   CO2 mmol/L 27 28 26  < > 23   ANION GAP mmol/L 9 9 11  < > 14*   BUN mg/dL 6 9 10  < > 42*   CREATININE mg/dL 0 79 0 87 0 83  < > 2 02*   EGFR ml/min/1 73sq m 102 91 96  < > 33   GLUCOSE RANDOM mg/dL 133 112 96  < > 125   CALCIUM mg/dL 8 3 8 2* 7 8*  < > 8 2*   AST U/L  --   --   --   --  27   ALT U/L  --   --   --   --  18   ALK PHOS U/L  --   --   --   --  60   TOTAL PROTEIN g/dL  --   --   --   --  6 8   BILIRUBIN TOTAL mg/dL  --   --   --   --  0 90   < > = values in this interval not displayed  Results from last 7 days  Lab Units 03/26/18  0520 03/25/18  0645 03/24/18  0547   WBC Thousand/uL 12 77* 18 80* 16 96*   HEMOGLOBIN g/dL 9 0* 9 2* 8 8*   PLATELETS Thousands/uL 255 201 227       Results from last 7 days  Lab Units 03/21/18  1410 03/20/18  1237 03/19/18  1849 03/19/18  1705   BLOOD CULTURE   --   --   --  No Growth After 5 Days  No Growth After 5 Days  MRSA CULTURE ONLY  No Methicillin Resistant Staphlyococcus aureus (MRSA) isolated  --   --   --    INFLUENZA A PCR   --   --  None Detected  --    INFLUENZA B PCR   --   --  None Detected  --    RSV PCR   --   --  None Detected  --    LEGIONELLA URINARY ANTIGEN   --  Negative  --   --        Imaging Studies:   I have personally reviewed pertinent imaging study reports and images in PACS  EKG, Pathology, and Other Studies:   I have personally reviewed pertinent reports

## 2018-03-26 NOTE — PROGRESS NOTES
Progress Note - Pulmonary   Elridge Geovanna 29 y o  female MRN: [id number]  Unit/Bed#: ICU 09 Encounter: [id number]      Assessment/Plan:  1  Acute hypoxic respiratory failure-continues to require high-flow nasal cannula, 60%  1  Continue to monitor oxygen saturation titrate oxygen therapy to maintain a pulse ox of equal to or greater than 90%  2  Continue to encourage aggressive pulmonary toilet:  Incentive spirometry, flutter valve, out of bed with increasing ambulation as tolerated  2  Multilobar community-acquired pneumonia  1  Continue current antibiotic regimen per Infectious Disease including:  Vancomycin day 6, cefepime day, Zyvox day 3  2  Pulmonary toilet as indicated above  3  Small right parapneumonic effusion-no indication for thoracentesis at this time      * she appears to be improving clinically-WBC is trending downward  Subjective:   Marylee Cheek was seen out of bed in the chair upon entering the room  She denies any acute overnight events  She continues to have a nonproductive cough  She feels that her cough has increased in frequency  She also feels that her SOB is improving  She currently denies: chest pain, pain with inspiration, fevers, chills, night sweats, nausea, vomiting, diarrhea, headache, bronchospasm, dizziness, or hemoptysis  Objective:         Vitals: Blood pressure 130/72, pulse 76, temperature (!) 96 7 °F (35 9 °C), temperature source Tympanic, resp  rate (!) 25, height 5' 3" (1 6 m), weight 71 8 kg (158 lb 3 2 oz), SpO2 90 %  ,   Body mass index is 28 02 kg/m²        Intake/Output Summary (Last 24 hours) at 03/26/18 1022  Last data filed at 03/26/18 0901   Gross per 24 hour   Intake             1130 ml   Output             2750 ml   Net            -1620 ml         Physical Exam  Gen: Awake, alert, oriented x 3, no acute distress  HEENT: Mucous membranes moist, no oral lesions, no thrush, HFNC  NECK: No accessory muscle use, JVP not elevated  Cardiac: Regular, single S1, single S2, no murmurs, no rubs, no gallops  Lungs: bronchial lung sounds over right base, no wheezes rhonchi or rales appreciated  Abdomen: normoactive bowel sounds, soft nontender, nondistended, no rebound or rigidity, no guarding  Extremities: no cyanosis, no clubbing, no edema    Labs: I have personally reviewed pertinent lab results  , CBC:   Lab Results   Component Value Date    WBC 12 77 (H) 03/26/2018    HGB 9 0 (L) 03/26/2018    HCT 26 9 (L) 03/26/2018    MCV 81 (L) 03/26/2018     03/26/2018    MCH 26 9 03/26/2018    MCHC 33 5 03/26/2018    RDW 16 4 (H) 03/26/2018    MPV 10 0 03/26/2018   , CMP:   Lab Results   Component Value Date     03/26/2018    K 3 4 (L) 03/26/2018     03/26/2018    CO2 27 03/26/2018    ANIONGAP 9 03/26/2018    BUN 6 03/26/2018    CREATININE 0 79 03/26/2018    GLUCOSE 133 03/26/2018    CALCIUM 8 3 03/26/2018    EGFR 102 03/26/2018       Imaging and other studies: I have personally reviewed pertinent reports     and I have personally reviewed pertinent films in PACS  CT chest 3/24/2018-dense airspace consolidation in bilateral lower lobes with patchy airspace consolidation in all fields and upper lobe airspace disease that has worsened from prior CT 3/21/2018    Caleb Harris

## 2018-03-26 NOTE — PROGRESS NOTES
Progress Note - Jesse Baker 1990, 29 y o  female MRN: [id number]    Unit/Bed#: ICU 09 Encounter: [id number]    Primary Care Provider: Duane Decent, DO   Date and time admitted to hospital: 3/19/2018  4:26 PM        Acute respiratory failure with hypoxia [facility])   Assessment & Plan    Secondary severe bilateral pneumonia and parapneumonic effusion  Treatment of this pneumonia is described above  Pt is currently on high flow  Wean oxygen as tolerated  Pt will try nc today to see if she can tolerate this  Pneumonia   Assessment & Plan     Multilobar pneumonia with a right-sided loculated parapneumonic effusion that was present on admission  Critical care and ID following  Possible post influenzae pneumonia  Pt is slowly improving  Continue current IV antibiotics  HIV screening is negative  Wbc is decreasing  * Sepsis ([address] )   Assessment & Plan    PRESENT ON ADMISSION  Due to the pneumonia described above  Please see above about treatment plan  Hypokalemia   Assessment & Plan    Will replace  Iron deficiency anemia   Assessment & Plan    H/h stable  Will continue to monitor              VTE Pharmacologic Prophylaxis:   Pharmacologic: Enoxaparin (Lovenox)  Mechanical VTE Prophylaxis in Place: Yes      Time Spent for Care: 20 minutes  More than 50% of total time spent on counseling and coordination of care as described above  Current Length of Stay: 7 day(s)    Current Patient Status: Inpatient   Code Status: Level 1 - Full Code      Subjective:   Pt seen and examined  Pt states she is breathing better but still gets winded easily  No f/c no cp no n/v/d no abd pain  She is requesting ensure supplements  No constipation  Objective:     Vitals:   Temp (24hrs), Av 2 °F (36 8 °C), Min:96 7 °F (35 9 °C), Max:99 2 °F (37 3 °C)    HR:  [] 76  Resp:  [18-29] 24  BP: (118-134)/(64-74) 130/72  SpO2:  [89 %-99 %] 97 %  Body mass index is 28 02 kg/m²       Input and Output Summary (last 24 hours): Intake/Output Summary (Last 24 hours) at 03/26/18 0838  Last data filed at 03/26/18 0400   Gross per 24 hour   Intake             1140 ml   Output             3350 ml   Net            -2210 ml       Physical Exam:     Physical Exam   Constitutional: She is oriented to person, place, and time  She appears well-developed and well-nourished  HENT:   Head: Normocephalic and atraumatic  Eyes: EOM are normal  Pupils are equal, round, and reactive to light  Neck: Normal range of motion  Neck supple  Cardiovascular: Normal rate and normal heart sounds  Exam reveals no gallop and no friction rub  No murmur heard  Pulmonary/Chest: Effort normal    Minimal decrease at bases bilateral   Abdominal: Soft  Bowel sounds are normal  She exhibits no distension  There is no tenderness  There is no rebound  Musculoskeletal: Normal range of motion  Neurological: She is alert and oriented to person, place, and time  Skin: Skin is warm and dry  Psychiatric: She has a normal mood and affect  Additional Data:     Labs:      Results from last 7 days  Lab Units 03/26/18  0520  03/19/18  1705   WBC Thousand/uL 12 77*  < > 11 56*   HEMOGLOBIN g/dL 9 0*  < > 10 3*   HEMATOCRIT % 26 9*  < > 30 1*   PLATELETS Thousands/uL 255  < > 183   LYMPHO PCT %  --   --  3*   MONO PCT MAN %  --   --  2*   EOSINO PCT MANUAL %  --   --  0   < > = values in this interval not displayed  Results from last 7 days  Lab Units 03/26/18  0520  03/19/18  1705   SODIUM mmol/L 137  < > 135*   POTASSIUM mmol/L 3 4*  < > 3 9   CHLORIDE mmol/L 101  < > 98*   CO2 mmol/L 27  < > 23   BUN mg/dL 6  < > 42*   CREATININE mg/dL 0 79  < > 2 02*   CALCIUM mg/dL 8 3  < > 8 2*   TOTAL PROTEIN g/dL  --   --  6 8   BILIRUBIN TOTAL mg/dL  --   --  0 90   ALK PHOS U/L  --   --  60   ALT U/L  --   --  18   AST U/L  --   --  27   GLUCOSE RANDOM mg/dL 133  < > 125   < > = values in this interval not displayed      Results from last 7 days  Lab Units 03/20/18  0545   INR  1 38*       * I Have Reviewed All Lab Data Listed Above  Recent Cultures (last 7 days):       Results from last 7 days  Lab Units 03/20/18  1237 03/19/18  1849 03/19/18  1705   BLOOD CULTURE   --   --  No Growth After 5 Days  No Growth After 5 Days     INFLUENZA A PCR   --  None Detected  --    INFLUENZA B PCR   --  None Detected  --    RSV PCR   --  None Detected  --    LEGIONELLA URINARY ANTIGEN  Negative  --   --        Last 24 Hours Medication List:     Current Facility-Administered Medications:  acetaminophen 650 mg Oral Q6H PRN Loli Mcdaniels PA-C    albuterol 2 puff Inhalation Q4H PRN Ninoska Leiva MD    albuterol 2 5 mg Nebulization Q6H PRN Loli Mcdaniels PA-C    ALPRAZolam 0 25 mg Oral TID PRN Essie Severs, CRNP    cefepime 2,000 mg Intravenous Q12H Jean Pierre Elder MD Last Rate: 2,000 mg (03/26/18 0514)   enoxaparin 40 mg Subcutaneous Q24H [facility] Bernadette Preciado MD    ferrous sulfate 325 mg Oral Daily With Breakfast Jean Pierre Elder MD    guaiFENesin 600 mg Oral Q12H [facility] Loli Mcdaniels PA-C    levalbuterol 1 25 mg Nebulization TID GILBERTO Norris    linezolid 600 mg Intravenous Q12H GILBERTO Hernandez Last Rate: 0 mg (03/25/18 1752)   metroNIDAZOLE 500 mg Intravenous Q8H Bernadette Preciado MD Last Rate: 500 mg (03/26/18 0106)   ondansetron 8 mg Intravenous Q6H PRN Essie Severs, CRNP Last Rate: 8 mg (03/24/18 1745)   pantoprazole 40 mg Intravenous Q24H [facility] GILBERTO Hernandez    saccharomyces boulardii 250 mg Oral BID GILBERTO Hernandez    sodium chloride 3 mL Nebulization Q6H PRN Jean Pierre Elder MD    vancomycin 15 mg/kg (Adjusted) Intravenous Q12H GILBERTO Hernandez Last Rate: 1,000 mg (03/26/18 0107)        Today, Patient Was Seen By: Kasey Palm DO

## 2018-03-27 LAB
ANION GAP SERPL CALCULATED.3IONS-SCNC: 7 MMOL/L (ref 4–13)
BUN SERPL-MCNC: 6 MG/DL (ref 5–25)
CALCIUM SERPL-MCNC: 8.4 MG/DL (ref 8.3–10.1)
CHLORIDE SERPL-SCNC: 104 MMOL/L (ref 100–108)
CO2 SERPL-SCNC: 27 MMOL/L (ref 21–32)
CREAT SERPL-MCNC: 0.86 MG/DL (ref 0.6–1.3)
ERYTHROCYTE [DISTWIDTH] IN BLOOD BY AUTOMATED COUNT: 16.5 % (ref 11.6–15.1)
GFR SERPL CREATININE-BSD FRML MDRD: 92 ML/MIN/1.73SQ M
GLUCOSE SERPL-MCNC: 118 MG/DL (ref 65–140)
HCT VFR BLD AUTO: 28.3 % (ref 34.8–46.1)
HGB BLD-MCNC: 9.3 G/DL (ref 11.5–15.4)
MCH RBC QN AUTO: 26.7 PG (ref 26.8–34.3)
MCHC RBC AUTO-ENTMCNC: 32.9 G/DL (ref 31.4–37.4)
MCV RBC AUTO: 81 FL (ref 82–98)
PLATELET # BLD AUTO: 290 THOUSANDS/UL (ref 149–390)
PMV BLD AUTO: 9.9 FL (ref 8.9–12.7)
POTASSIUM SERPL-SCNC: 3.6 MMOL/L (ref 3.5–5.3)
RBC # BLD AUTO: 3.48 MILLION/UL (ref 3.81–5.12)
SODIUM SERPL-SCNC: 138 MMOL/L (ref 136–145)
WBC # BLD AUTO: 9.85 THOUSAND/UL (ref 4.31–10.16)

## 2018-03-27 PROCEDURE — 94760 N-INVAS EAR/PLS OXIMETRY 1: CPT

## 2018-03-27 PROCEDURE — 85027 COMPLETE CBC AUTOMATED: CPT | Performed by: INTERNAL MEDICINE

## 2018-03-27 PROCEDURE — 99232 SBSQ HOSP IP/OBS MODERATE 35: CPT | Performed by: INTERNAL MEDICINE

## 2018-03-27 PROCEDURE — 80048 BASIC METABOLIC PNL TOTAL CA: CPT | Performed by: INTERNAL MEDICINE

## 2018-03-27 PROCEDURE — 94640 AIRWAY INHALATION TREATMENT: CPT

## 2018-03-27 PROCEDURE — 99233 SBSQ HOSP IP/OBS HIGH 50: CPT | Performed by: INTERNAL MEDICINE

## 2018-03-27 PROCEDURE — C9113 INJ PANTOPRAZOLE SODIUM, VIA: HCPCS | Performed by: NURSE PRACTITIONER

## 2018-03-27 RX ADMIN — ISODIUM CHLORIDE 3 ML: 0.03 SOLUTION RESPIRATORY (INHALATION) at 13:40

## 2018-03-27 RX ADMIN — LINEZOLID 600 MG: 600 INJECTION, SOLUTION INTRAVENOUS at 13:45

## 2018-03-27 RX ADMIN — ISODIUM CHLORIDE 3 ML: 0.03 SOLUTION RESPIRATORY (INHALATION) at 20:05

## 2018-03-27 RX ADMIN — GUAIFENESIN 600 MG: 600 TABLET, EXTENDED RELEASE ORAL at 21:04

## 2018-03-27 RX ADMIN — GUAIFENESIN 600 MG: 600 TABLET, EXTENDED RELEASE ORAL at 09:28

## 2018-03-27 RX ADMIN — Medication 250 MG: at 09:29

## 2018-03-27 RX ADMIN — Medication 250 MG: at 17:04

## 2018-03-27 RX ADMIN — ISODIUM CHLORIDE 3 ML: 0.03 SOLUTION RESPIRATORY (INHALATION) at 07:29

## 2018-03-27 RX ADMIN — FERROUS SULFATE TAB 325 MG (65 MG ELEMENTAL FE) 325 MG: 325 (65 FE) TAB at 09:29

## 2018-03-27 RX ADMIN — ENOXAPARIN SODIUM 40 MG: 40 INJECTION SUBCUTANEOUS at 09:34

## 2018-03-27 RX ADMIN — LINEZOLID 600 MG: 600 INJECTION, SOLUTION INTRAVENOUS at 02:00

## 2018-03-27 RX ADMIN — ALPRAZOLAM 0.25 MG: 0.25 TABLET ORAL at 21:04

## 2018-03-27 RX ADMIN — LEVALBUTEROL HYDROCHLORIDE 1.25 MG: 1.25 SOLUTION, CONCENTRATE RESPIRATORY (INHALATION) at 13:40

## 2018-03-27 RX ADMIN — LEVALBUTEROL HYDROCHLORIDE 1.25 MG: 1.25 SOLUTION, CONCENTRATE RESPIRATORY (INHALATION) at 07:29

## 2018-03-27 RX ADMIN — PANTOPRAZOLE SODIUM 40 MG: 40 INJECTION, POWDER, FOR SOLUTION INTRAVENOUS at 09:31

## 2018-03-27 RX ADMIN — LEVALBUTEROL HYDROCHLORIDE 1.25 MG: 1.25 SOLUTION, CONCENTRATE RESPIRATORY (INHALATION) at 20:03

## 2018-03-27 NOTE — PROGRESS NOTES
Report called to E5 RN [facility]  Patient transferred in stable condition on 3L NC   IV site intact  Flushed and saline locked  No c/o pain offered  Belongings sent  Transferred via wc accompanied by PCA

## 2018-03-27 NOTE — PROGRESS NOTES
Progress Note - Infectious Disease   Soco Miranda 29 y o  female MRN: [id number]  Unit/Bed#: E5 -01 Encounter: [id number]      Impression/Recommendations:  1   Multi lobar pneumonia, clinically much improved over last 2 days  Enrrique Shay influenza like symptoms the week prior to acute infection, together with lack of influenza vaccination, I am concerned about the possibility of post influenza pneumonia in this patient  Tg Conine PCR is negative, which would be likely if patient already recovered from her influenza infection   Given severity of pneumonia with this background, community-acquired MRSA infection is possible      Patient completed antibiotic course previously  She is now completing a course of linezolid for possible community-acquired MRSA pneumonia  Continue linezolid x 7 days total, another 3 days  No other antibiotics  Monitor respiratory symptoms closely  Monitor temperature/WBC      2   Sepsis, POA   This is secondary to pneumonia above   Patient is systemically improved   Blood cultures remain negative  Antibiotic plan as in above  Monitor hemodynamics      3  Acute hypoxic respiratory failure   This is secondary to severe pneumonia above   Patient's respiratory status continues to improve  No clinical signs of respiratory distress or failure  O2 support per primary service  Monitor respiratory symptoms      4   Small pleural effusions   On prior CT, effusion appears to be small on free-flowing, unlikely to be empyema    Repeat CT also showed small effusion, without loculation   Empyema unlikely  Monitor for now      5   Leukocytosis   This may be secondary to persistent pneumonia above   Patient did receive 1 dose of corticosteroid previously   Persistent leukocytosis may be secondary to it   WBC has normalized  Monitor WBC      Discussed with the patient in detail regarding the above plan  Discussed with Dr Donnie Franco    Plan for transfer out of ICU to floor noted      Antibiotics:  Zyvox # 4     Subjective:  Patient continues to improve  Dyspnea improving, still on O2 support  Cough better, still nonproductive  Temperature stays down   No chills  She is tolerating antibiotics well   No nausea, vomiting or diarrhea    Objective:  Vitals:  HR:  [] 89  Resp:  [19-28] 19  BP: (120-129)/(60-66) 120/66  SpO2:  [89 %-97 %] 91 %  Temp (24hrs), Av 1 °F (36 7 °C), Min:97 6 °F (36 4 °C), Max:98 6 °F (37 °C)  Current: Temperature: 98 6 °F (37 °C)    Physical Exam:     General: Awake, alert, cooperative, no distress  Lungs: Expansion symmetric, improved bibasilar rales, no wheezing, respirations unlabored  Heart[de-identified]  Regular rate and rhythm, S1 and S2 normal, no murmur  Abdomen: Soft, nondistended, non-tender, bowel sounds active all four quadrants,        no masses, no organomegaly  Extremities: Trace edema  No erythema/warmth  No ulcer  Nontender to palpation  Skin:  No rash  Invasive Devices     Peripheral Intravenous Line            Peripheral IV 18 Left Antecubital less than 1 day                Labs studies:   I have personally reviewed pertinent labs      Results from last 7 days  Lab Units 18  0440 18  0520 18  0900   SODIUM mmol/L 138 137 139   POTASSIUM mmol/L 3 6 3 4* 3 2*   CHLORIDE mmol/L 104 101 102   CO2 mmol/L 27 27 28   ANION GAP mmol/L 7 9 9   BUN mg/dL 6 6 9   CREATININE mg/dL 0 86 0 79 0 87   EGFR ml/min/1 73sq m 92 102 91   GLUCOSE RANDOM mg/dL 118 133 112   CALCIUM mg/dL 8 4 8 3 8 2*       Results from last 7 days  Lab Units 18  0439 18  0520 18  0645   WBC Thousand/uL 9 85 12 77* 18 80*   HEMOGLOBIN g/dL 9 3* 9 0* 9 2*   PLATELETS Thousands/uL 290 255 201       Results from last 7 days  Lab Units 18  1410   MRSA CULTURE ONLY  No Methicillin Resistant Staphlyococcus aureus (MRSA) isolated       Imaging Studies:   I have personally reviewed pertinent imaging study reports and images in PACS  EKG, Pathology, and Other Studies:   I have personally reviewed pertinent reports

## 2018-03-27 NOTE — PROGRESS NOTES
Progress Note - Pulmonary   Amandeep Brown 29 y o  female MRN: [id number]  Unit/Bed#: ICU 09 Encounter: [id number]      Assessment/Plan:  1  Acute hypoxic respiratory failure-slowly improving  1  Currently on 2 L nasal cannula while awake, and increase to 4 L nasal cannula while sleeping  2  Continue monitor oxygen saturation titrate oxygen therapy to maintain a pulse ox of equal to or greater than 90%  3  Continue encourage aggressive pulmonary toilet to include: Incentive spirometry, flutter valve, out of bed with increasing ambulation  2  Multilobar community-acquired pneumonia  1  Infectious Disease following  2  Will complete vancomycin and cefepime today  3  Continue Zyvox for 3 more additional days to complete 7 day course  4  Continue Mucinex for secretion clearance  5  Continue bronchodilators t i d   3  Small right parapneumonic effusion-no indication for thoracentesis    * appropriate for transition to [facility] from a pulmonary standpoint  * she follow-up in the pulmonary office outpatient upon discharge, II and time in discharge instructions and have repeat chest imaging in 4-6 weeks    Subjective:   Seven use seen lying in bed upon entering the room  She denies any acute overnight events, but does report she had a difficult time sleeping  She is beginning to get frustrated with the slow improvements  She continues to have a cough that is nonproductive  She feels that her shortness of breath and weakness is improving  She currently denies chest pain, pain with inspiration, fevers, chills, night sweats, nausea, vomiting, bronchospasm, diarrhea, headache, dizziness or hemoptysis    Objective:         Vitals: Blood pressure 129/62, pulse 80, temperature 97 8 °F (36 6 °C), temperature source Tympanic Core, resp  rate 21, height 5' 3" (1 6 m), weight 71 8 kg (158 lb 3 2 oz), SpO2 91 % , 2LNC, Body mass index is 28 02 kg/m²        Intake/Output Summary (Last 24 hours) at 03/27/18 0953  Last data filed at 03/27/18 0700   Gross per 24 hour   Intake             1200 ml   Output             1800 ml   Net             -600 ml         Physical Exam  Gen: Awake, alert, oriented x 3, no acute distress  HEENT: Mucous membranes moist, no oral lesions, no thrush, oxygen via NC  NECK: No accessory muscle use, JVP not elevated  Cardiac: Regular, single S1, single S2, no murmurs, no rubs, no gallops  Lungs: Bronchial breath sounds over right base, no wheezes, rhonchi or rales  Abdomen: normoactive bowel sounds, soft nontender, nondistended, no rebound or rigidity, no guarding  Extremities: no cyanosis, no clubbing, no edema    Labs:   CBC:   Lab Results   Component Value Date    WBC 9 85 03/27/2018    HGB 9 3 (L) 03/27/2018    HCT 28 3 (L) 03/27/2018    MCV 81 (L) 03/27/2018     03/27/2018    MCH 26 7 (L) 03/27/2018    MCHC 32 9 03/27/2018    RDW 16 5 (H) 03/27/2018    MPV 9 9 03/27/2018   , CMP:   Lab Results   Component Value Date     03/27/2018    K 3 6 03/27/2018     03/27/2018    CO2 27 03/27/2018    ANIONGAP 7 03/27/2018    BUN 6 03/27/2018    CREATININE 0 86 03/27/2018    GLUCOSE 118 03/27/2018    CALCIUM 8 4 03/27/2018    EGFR 92 03/27/2018     Imaging and other studies: No new pulmonary imaging to review      GILBERTO Jesus

## 2018-03-27 NOTE — ASSESSMENT & PLAN NOTE
Multilobar pneumonia with a right-sided loculated parapneumonic effusion that was present on admission  Critical care and ID following  Possible post influenzae pneumonia  Pt is slowly improving  Continue current IV antibiotics  She will need 4 more days of antibiotics  HIV screening is negative  Leukocytosis has resolved

## 2018-03-27 NOTE — PLAN OF CARE
DISCHARGE PLANNING     Discharge to home or other facility with appropriate resources Progressing        DISCHARGE PLANNING - CARE MANAGEMENT     Discharge to post-acute care or home with appropriate resources Progressing        INFECTION - ADULT     Absence or prevention of progression during hospitalization Progressing     Absence of fever/infection during neutropenic period Progressing        Knowledge Deficit     Patient/family/caregiver demonstrates understanding of disease process, treatment plan, medications, and discharge instructions Progressing        Nutrition/Hydration-ADULT     Nutrient/Hydration intake appropriate for improving, restoring or maintaining nutritional needs Progressing        PAIN - ADULT     Verbalizes/displays adequate comfort level or baseline comfort level Progressing        Potential for Falls     Patient will remain free of falls Progressing        RESPIRATORY - ADULT     Achieves optimal ventilation and oxygenation Progressing        SAFETY ADULT     Patient will remain free of falls Progressing     Maintain or return to baseline ADL function Progressing     Maintain or return mobility status to optimal level Progressing

## 2018-03-27 NOTE — SOCIAL WORK
ALINA paperwork provided by the patients employer via fax, will work on completing paperwork for patient

## 2018-03-27 NOTE — ASSESSMENT & PLAN NOTE
Secondary severe bilateral pneumonia and parapneumonic effusion  Treatment of this pneumonia is described above  Pt was weaned to nc  She is currently tolerating 3 liters sating 88-90%  She will need ambulatory desat study prior to discharge

## 2018-03-27 NOTE — PROGRESS NOTES
Progress Note - Vincent Arguelles 1990, 29 y o  female MRN: [id number]    Unit/Bed#: ICU 09 Encounter: [id number]    Primary Care Provider: Soo Gandhi DO   Date and time admitted to hospital: 3/19/2018  4:26 PM        Acute respiratory failure with hypoxia [facility])   Assessment & Plan    Secondary severe bilateral pneumonia and parapneumonic effusion  Treatment of this pneumonia is described above  Pt was weaned to nc  She is currently tolerating 3 liters sating 88-90%  She will need ambulatory desat study prior to discharge  Pneumonia   Assessment & Plan     Multilobar pneumonia with a right-sided loculated parapneumonic effusion that was present on admission  Critical care and ID following  Possible post influenzae pneumonia  Pt is slowly improving  Continue current IV antibiotics  She will need 4 more days of antibiotics  HIV screening is negative  Leukocytosis has resolved  * Sepsis ([address] )   Assessment & Plan    PRESENT ON ADMISSION  Due to the pneumonia described above  Please see above about treatment plan  Hypokalemia   Assessment & Plan    replaced        Iron deficiency anemia   Assessment & Plan    H/h stable  Will continue to monitor            VTE Pharmacologic Prophylaxis:   Pharmacologic: Enoxaparin (Lovenox)  Mechanical VTE Prophylaxis in Place: Yes     Time Spent for Care: 20 minutes  More than 50% of total time spent on counseling and coordination of care as described above  Current Length of Stay: 8 day(s)    Current Patient Status: Inpatient     dispo- okay to move out of step down to med surg    Code Status: Level 1 - Full Code      Subjective:   Pt seen and examined  Pt is feeling better  She isn't sleeping well in icu  She is using incentive spirometry   No f/c no cp no n/v/d no abd pain    Objective:     Vitals:   Temp (24hrs), Av 9 °F (36 6 °C), Min:97 6 °F (36 4 °C), Max:98 2 °F (36 8 °C)    HR:  [] 88  Resp:  [19-28] 26  BP: (110-129)/(59-73) 129/62  SpO2:  [88 %-97 %] 92 %  Body mass index is 28 02 kg/m²  Input and Output Summary (last 24 hours): Intake/Output Summary (Last 24 hours) at 03/27/18 0819  Last data filed at 03/27/18 0700   Gross per 24 hour   Intake             1430 ml   Output             1800 ml   Net             -370 ml       Physical Exam:     Physical Exam   Constitutional: She is oriented to person, place, and time  She appears well-developed and well-nourished  HENT:   Head: Normocephalic and atraumatic  Eyes: EOM are normal  Pupils are equal, round, and reactive to light  Neck: Normal range of motion  Neck supple  Cardiovascular: Normal rate, regular rhythm and normal heart sounds  Pulmonary/Chest:   Coarse breathsounds through out with decrease at bases bilateral   Abdominal: Soft  Bowel sounds are normal    Musculoskeletal: Normal range of motion  Neurological: She is alert and oriented to person, place, and time  Skin: Skin is warm and dry  Psychiatric: She has a normal mood and affect  Additional Data:     Labs:      Results from last 7 days  Lab Units 03/27/18  0439   WBC Thousand/uL 9 85   HEMOGLOBIN g/dL 9 3*   HEMATOCRIT % 28 3*   PLATELETS Thousands/uL 290       Results from last 7 days  Lab Units 03/27/18  0440   SODIUM mmol/L 138   POTASSIUM mmol/L 3 6   CHLORIDE mmol/L 104   CO2 mmol/L 27   BUN mg/dL 6   CREATININE mg/dL 0 86   CALCIUM mg/dL 8 4   GLUCOSE RANDOM mg/dL 118           * I Have Reviewed All Lab Data Listed Above        Recent Cultures (last 7 days):       Results from last 7 days  Lab Units 03/20/18  1237   LEGIONELLA URINARY ANTIGEN  Negative       Last 24 Hours Medication List:     Current Facility-Administered Medications:  acetaminophen 650 mg Oral Q6H PRN Pardeep Flores PA-C    albuterol 2 puff Inhalation Q4H PRN Sapphire Truong MD    albuterol 2 5 mg Nebulization Q6H PRN Pardeep Flores PA-C    ALPRAZolam 0 25 mg Oral TID PRN GILBERTO Gupta enoxaparin 40 mg Subcutaneous Q24H [address] Vanesa Villegas MD    ferrous sulfate 325 mg Oral Daily With Breakfast Marianne Cardenas MD    guaiFENesin 600 mg Oral Q12H [address] Pradip Reich PA-C    levalbuterol 1 25 mg Nebulization TID GILBERTO Davis    linezolid 600 mg Intravenous Q12H GILBERTO Thomas Last Rate: Stopped (03/27/18 0300)   ondansetron 8 mg Intravenous Q6H PRN GILBERTO Hernandez Last Rate: 8 mg (03/24/18 1745)   pantoprazole 40 mg Intravenous Q24H [address] GILBERTO Thomas    saccharomyces boulardii 250 mg Oral BID GILBERTO Thomas    sodium chloride 3 mL Nebulization Q6H PRN Marianne Cardenas MD         Today, Patient Was Seen By: Doyle Collet, DO

## 2018-03-28 VITALS
BODY MASS INDEX: 28.03 KG/M2 | SYSTOLIC BLOOD PRESSURE: 124 MMHG | RESPIRATION RATE: 18 BRPM | TEMPERATURE: 99 F | OXYGEN SATURATION: 91 % | HEIGHT: 63 IN | HEART RATE: 127 BPM | DIASTOLIC BLOOD PRESSURE: 64 MMHG | WEIGHT: 158.2 LBS

## 2018-03-28 PROCEDURE — 99233 SBSQ HOSP IP/OBS HIGH 50: CPT | Performed by: INTERNAL MEDICINE

## 2018-03-28 PROCEDURE — 94760 N-INVAS EAR/PLS OXIMETRY 1: CPT

## 2018-03-28 PROCEDURE — 94618 PULMONARY STRESS TESTING: CPT

## 2018-03-28 PROCEDURE — C9113 INJ PANTOPRAZOLE SODIUM, VIA: HCPCS | Performed by: NURSE PRACTITIONER

## 2018-03-28 PROCEDURE — 94640 AIRWAY INHALATION TREATMENT: CPT

## 2018-03-28 PROCEDURE — 99232 SBSQ HOSP IP/OBS MODERATE 35: CPT | Performed by: INTERNAL MEDICINE

## 2018-03-28 PROCEDURE — 99239 HOSP IP/OBS DSCHRG MGMT >30: CPT | Performed by: INTERNAL MEDICINE

## 2018-03-28 RX ORDER — LINEZOLID 600 MG/1
600 TABLET, FILM COATED ORAL EVERY 12 HOURS SCHEDULED
Qty: 5 TABLET | Refills: 0 | Status: SHIPPED | OUTPATIENT
Start: 2018-03-28 | End: 2018-03-30

## 2018-03-28 RX ORDER — ALBUTEROL SULFATE 2.5 MG/3ML
2.5 SOLUTION RESPIRATORY (INHALATION) EVERY 6 HOURS PRN
Qty: 75 ML | Refills: 3 | Status: SHIPPED | OUTPATIENT
Start: 2018-03-28 | End: 2018-04-04

## 2018-03-28 RX ORDER — SACCHAROMYCES BOULARDII 250 MG
250 CAPSULE ORAL 2 TIMES DAILY
Qty: 60 CAPSULE | Refills: 3 | Status: SHIPPED | OUTPATIENT
Start: 2018-03-28 | End: 2018-04-04

## 2018-03-28 RX ORDER — GUAIFENESIN 600 MG
600 TABLET, EXTENDED RELEASE 12 HR ORAL EVERY 12 HOURS SCHEDULED
Qty: 60 TABLET | Refills: 0
Start: 2018-03-28 | End: 2018-04-04

## 2018-03-28 RX ORDER — ALBUTEROL SULFATE 90 UG/1
2 AEROSOL, METERED RESPIRATORY (INHALATION) EVERY 4 HOURS PRN
Qty: 1 INHALER | Refills: 2 | Status: SHIPPED | OUTPATIENT
Start: 2018-03-28 | End: 2018-04-04

## 2018-03-28 RX ORDER — FERROUS SULFATE 325(65) MG
325 TABLET ORAL
Qty: 30 TABLET | Refills: 3 | Status: SHIPPED | OUTPATIENT
Start: 2018-03-29 | End: 2018-04-04

## 2018-03-28 RX ORDER — LINEZOLID 600 MG/1
600 TABLET, FILM COATED ORAL EVERY 12 HOURS SCHEDULED
Status: DISCONTINUED | OUTPATIENT
Start: 2018-03-28 | End: 2018-03-28 | Stop reason: HOSPADM

## 2018-03-28 RX ADMIN — LEVALBUTEROL HYDROCHLORIDE 1.25 MG: 1.25 SOLUTION, CONCENTRATE RESPIRATORY (INHALATION) at 13:47

## 2018-03-28 RX ADMIN — GUAIFENESIN 600 MG: 600 TABLET, EXTENDED RELEASE ORAL at 08:01

## 2018-03-28 RX ADMIN — LINEZOLID 600 MG: 600 TABLET, FILM COATED ORAL at 13:19

## 2018-03-28 RX ADMIN — Medication 250 MG: at 17:32

## 2018-03-28 RX ADMIN — LINEZOLID 600 MG: 600 INJECTION, SOLUTION INTRAVENOUS at 02:44

## 2018-03-28 RX ADMIN — Medication 250 MG: at 08:01

## 2018-03-28 RX ADMIN — ENOXAPARIN SODIUM 40 MG: 40 INJECTION SUBCUTANEOUS at 08:01

## 2018-03-28 RX ADMIN — FERROUS SULFATE TAB 325 MG (65 MG ELEMENTAL FE) 325 MG: 325 (65 FE) TAB at 08:01

## 2018-03-28 RX ADMIN — PANTOPRAZOLE SODIUM 40 MG: 40 INJECTION, POWDER, FOR SOLUTION INTRAVENOUS at 08:01

## 2018-03-28 RX ADMIN — LEVALBUTEROL HYDROCHLORIDE 1.25 MG: 1.25 SOLUTION, CONCENTRATE RESPIRATORY (INHALATION) at 08:02

## 2018-03-28 RX ADMIN — ISODIUM CHLORIDE 3 ML: 0.03 SOLUTION RESPIRATORY (INHALATION) at 08:02

## 2018-03-28 NOTE — ASSESSMENT & PLAN NOTE
Multilobar pneumonia with a right-sided loculated parapneumonic effusion that was present on admission  Critical care and ID following  Possible post influenzae pneumonia  Pt is slowly improving  Continue current IV antibiotics  She will need 2 more days of antibiotics  HIV screening is negative  Leukocytosis has resolved

## 2018-03-28 NOTE — PROGRESS NOTES
Progress Note - Pulmonary   Mariah Cho 29 y o  female MRN: [id number]  Unit/Bed#: E5 -01 Encounter: [id number]      Assessment/Plan:  1  Acute hypoxic respiratory failure secondary to community-acquired multilobar pneumonia   1  Currently transition to 1 L nasal cannula with oxygen saturation of 92%, desaturated to 88% on room air  2  Continue titrate oxygen therapy to maintain a pulse ox of equal to or greater than 90%  3  Obtain ambulatory pulse ox today on RA and titrate oxygen while ambulating to maintain a pulse ox of equal to or greater than 88%-will likely need oxygen therapy at home at time of discharge  2  Multi lobar community-acquired pneumonia improving  1  Infectious Disease following  2  Continue antibiotics per ID, with transition to the p o  Zyvox for another 2 days for a total of 7 days  3  Continue bronchodilators TID while hospitalized  4  Continue Mucinex for secretion clearance  3  Small right-sided parapneumonic effusion-no further treatment indicated      * she will need outpatient follow-up with Pulmonary, and repeat chest imaging in 2 months-appointment date and time in imaging instructions and discharge  * pending ambulatory pulse ox results anticipate discharge within the next 24 hours      Subjective:   Seventy was seen lying in bed upon entering the room  She denies any acute overnight events  She continues to have mild shortness of breath has improved over the last 2 days  She also continues to have a mild cough that is greatly reduced in severity frequency and is continues to be nonproductive  She currently denies:  Chest pain, pain with inspiration, fevers, chills, night sweats, nausea, vomiting, diarrhea, headache, dizziness or hemoptysis  She expresses strong desire to be discharged home today    Objective:         Vitals: Blood pressure 124/64, pulse 67, temperature 99 °F (37 2 °C), temperature source Temporal, resp   rate 18, height 5' 3" (1 6 m), weight 71 8 kg (158 lb 3 2 oz), SpO2 94 %  , 2LNC, Body mass index is 28 02 kg/m²        Intake/Output Summary (Last 24 hours) at 03/28/18 1023  Last data filed at 03/27/18 1041   Gross per 24 hour   Intake              140 ml   Output                0 ml   Net              140 ml         Physical Exam  Gen: Awake, alert, oriented x 3, no acute distress  HEENT: Mucous membranes moist, no oral lesions, no thrush, oxygen via NC  NECK: No accessory muscle use, JVP not elevated  Cardiac: Regular, single S1, single S2, no murmurs, no rubs, no gallops  Lungs: diminished in the right base, no wheezes, rhonchi or rales noted  Abdomen: normoactive bowel sounds, soft nontender, nondistended, no rebound or rigidity, no guarding  Extremities: no cyanosis, no clubbing, no edema    Labs:     No new laboratory results to review  Imaging and other studies: no new pulmonary results to review      GILBERTO Mendez

## 2018-03-28 NOTE — ASSESSMENT & PLAN NOTE
Multilobar pneumonia with a right-sided loculated parapneumonic effusion that was present on admission  She was evaluated by critical care and ID  She was treated with vancomycin, cefepime, and flagyl  She was changed to zyvox to complete course   She will follow up with pulmonary outpt with a follow up xray

## 2018-03-28 NOTE — PLAN OF CARE
Problem: PAIN - ADULT  Goal: Verbalizes/displays adequate comfort level or baseline comfort level  Interventions:  - Encourage patient to monitor pain and request assistance  - Assess pain using appropriate pain scale  - Administer analgesics based on type and severity of pain and evaluate response  - Implement non-pharmacological measures as appropriate and evaluate response  - Consider cultural and social influences on pain and pain management  - Notify physician/advanced practitioner if interventions unsuccessful or patient reports new pain   Outcome: Progressing      Problem: INFECTION - ADULT  Goal: Absence or prevention of progression during hospitalization  INTERVENTIONS:  - Assess and monitor for signs and symptoms of infection  - Monitor lab/diagnostic results  - Monitor all insertion sites, i e  indwelling lines, tubes, and drains  - Monitor endotracheal (as able) and nasal secretions for changes in amount and color  - [place] appropriate cooling/warming therapies per order  - Administer medications as ordered  - Instruct and encourage patient and family to use good hand hygiene technique  - Identify and instruct in appropriate isolation precautions for identified infection/condition   Outcome: Progressing    Goal: Absence of fever/infection during neutropenic period  INTERVENTIONS:  - Monitor WBC  - Implement neutropenic guidelines   Outcome: Progressing      Problem: SAFETY ADULT  Goal: Patient will remain free of falls  INTERVENTIONS:  - Assess patient frequently for physical needs  -  Identify cognitive and physical deficits and behaviors that affect risk of falls    -  [place] fall precautions as indicated by assessment   - Educate patient/family on patient safety including physical limitations  - Instruct patient to call for assistance with activity based on assessment  - Modify environment to reduce risk of injury  - Consider OT/PT consult to assist with strengthening/mobility    Outcome: Progressing      Problem: Knowledge Deficit  Goal: Patient/family/caregiver demonstrates understanding of disease process, treatment plan, medications, and discharge instructions  Complete learning assessment and assess knowledge base  Interventions:  - Provide teaching at level of understanding  - Provide teaching via preferred learning methods   Outcome: Progressing      Problem: Potential for Falls  Goal: Patient will remain free of falls  INTERVENTIONS:  - Assess patient frequently for physical needs  -  Identify cognitive and physical deficits and behaviors that affect risk of falls    -  [place] fall precautions as indicated by assessment   - Educate patient/family on patient safety including physical limitations  - Instruct patient to call for assistance with activity based on assessment  - Modify environment to reduce risk of injury  - Consider OT/PT consult to assist with strengthening/mobility    Outcome: Progressing      Problem: RESPIRATORY - ADULT  Goal: Achieves optimal ventilation and oxygenation  INTERVENTIONS:  - Assess for changes in respiratory status  - Assess for changes in mentation and behavior  - Position to facilitate oxygenation and minimize respiratory effort  - Oxygen administration by appropriate delivery method based on oxygen saturation (per order) or ABGs  - Initiate smoking cessation education as indicated  - Encourage broncho-pulmonary hygiene including cough, deep breathe, Incentive Spirometry  - Assess the need for suctioning and aspirate as needed  - Assess and instruct to report SOB or any respiratory difficulty  - Respiratory Therapy support as indicated   Outcome: Progressing      Problem: Nutrition/Hydration-ADULT  Goal: Nutrient/Hydration intake appropriate for improving, restoring or maintaining nutritional needs  Monitor and assess patient's nutrition/hydration status for malnutrition (ex- brittle hair, bruises, dry skin, pale skin and conjunctiva, muscle wasting, smooth red tongue, and disorientation)  Collaborate with interdisciplinary team and initiate plan and interventions as ordered  Monitor patient's weight and dietary intake as ordered or per policy  Utilize nutrition screening tool and intervene per policy  Determine patient's food preferences and provide high-protein, high-caloric foods as appropriate       INTERVENTIONS:  - Monitor oral intake, urinary output, labs, and treatment plans  - Assess nutrition and hydration status and recommend course of action  - Evaluate amount of meals eaten  - Assist patient with eating if necessary   - Allow adequate time for meals  - Recommend/ encourage appropriate diets, oral nutritional supplements, and vitamin/mineral supplements  - Order, calculate, and assess calorie counts as needed  - Recommend, monitor, and adjust tube feedings and TPN/PPN based on assessed needs  - Assess need for intravenous fluids  - Provide specific nutrition/hydration education as appropriate  - Include patient/family/caregiver in decisions related to nutrition   Outcome: Progressing

## 2018-03-28 NOTE — ASSESSMENT & PLAN NOTE
Secondary severe bilateral pneumonia and parapneumonic effusion  Treatment of this pneumonia is described above  Pt was weaned to nasal cannula  She will need home o2 eval prior to discharge

## 2018-03-28 NOTE — ASSESSMENT & PLAN NOTE
Secondary severe bilateral pneumonia and parapneumonic effusion  Treatment of this pneumonia is described above  Pt was weaned to nasal cannula  She is requiring 2 liters at rest and 6 liters with exertion

## 2018-03-28 NOTE — DISCHARGE SUMMARY
Discharge- Yee Carlton 1990, 29 y o  female MRN: [id number]    Unit/Bed#: E5 -01 Encounter: [id number]    Primary Care Provider: Raimundo Lezama DO   Date and time admitted to hospital: 3/19/2018  4:26 PM        Acute respiratory failure with hypoxia [facility])   Assessment & Plan    Secondary severe bilateral pneumonia and parapneumonic effusion  Treatment of this pneumonia is described above  Pt was weaned to nasal cannula  She is requiring 2 liters at rest and 6 liters with exertion  Pneumonia   Assessment & Plan     Multilobar pneumonia with a right-sided loculated parapneumonic effusion that was present on admission  She was evaluated by critical care and ID  She was treated with vancomycin, cefepime, and flagyl  She was changed to zyvox to complete course  She will follow up with pulmonary outpt with a follow up xray             * Sepsis [facility])   Assessment & Plan    PRESENT ON ADMISSION  Due to the pneumonia described above  Please see above about treatment plan  Iron deficiency anemia   Assessment & Plan    She was started on iron replacement  H/h is stable  Resolved Problems  Date Reviewed: 3/28/2018          Resolved    Acute kidney injury ([facility] [address] ) 3/23/2018     Resolved by  Sandra Corrales MD    Dehydration 3/21/2018     Resolved by  Sandra Corrales MD          Consultations During Hospital Stay:  · Pulmonary   · Infectious disease    Procedures Performed:   · none    Significant Findings / Test Results:   Xr Chest Pa & Lateral  Result Date: 3/25/2018  Impression: Worsening bilateral infiltrates and pleural effusions  Xr Chest Pa & Lateral  Result Date: 3/21/2018  Impression: Worsening bilateral pneumonia with new left lower lobe consolidation  Recommend follow-up to resolution  Xr Chest 2 Views  Result Date: 3/20/2018  Impression: Right middle and lower lobe opacification compatible with pneumonia       Ct Chest Wo Contrast  Result Date: 3/21/2018  Impression: Multi lobar pneumonia (worse in the right lower lobe)  Small bilateral effusions  Ct Chest W Contrast  Result Date: 3/24/2018  Impression: Again seen is dense airspace consolidation in the lower lobes and patchy airspace consolidation throughout the rest of the lung  The upper lobe airspace disease has mildly worsened compared to 3/21/2018  There are small bilateral water density pleural effusions  There is no evidence of loculation or pleural enhancement to suggest empyema formation  Incidental Findings:   · none     Test Results Pending at Discharge (will require follow up):   · none     Outpatient Tests Requested:  Repeat cxr ordered by pulmonary     Hospital Course:     Cyn Becerra is a 29 y o  female patient who originally presented to the hospital on 3/19/2018 due to acute respiratory failure due to pneumonia  Please see above list of diagnoses and related plan for additional information  Discharge Day Visit / Exam:     * Please refer to separate progress note for these details *    Discharge instructions/Information to patient and family:   See after visit summary for information provided to patient and family  Provisions for Follow-Up Care:  See after visit summary for information related to follow-up care and any pertinent home health orders  Discharge Statement:  I spent 32 minutes discharging the patient  This time was spent on the day of discharge  I had direct contact with the patient on the day of discharge  Greater than 50% of the total time was spent examining patient, answering all patient questions, arranging and discussing plan of care with patient as well as directly providing post-discharge instructions  Additional time then spent on discharge activities  Discharge Medications:  See after visit summary for reconciled discharge medications provided to patient and family

## 2018-03-28 NOTE — PROGRESS NOTES
Addendum:  Okay for discharge from ID viewpoint  Progress Note - Infectious Disease   Jhon Moreno 29 y o  female MRN: [id number]  Unit/Bed#: E5 -01 Encounter: [id number]      Impression/Recommendations:  1   Multi lobar pneumonia, clinically much improved over last 2 days  [facility] influenza like symptoms the week prior to acute infection, together with lack of influenza vaccination, I am concerned about the possibility of post influenza pneumonia in this patient  Edgardo Andino PCR is negative, which would be likely if patient already recovered from her influenza infection   Given severity of pneumonia with this background, community-acquired MRSA infection is possible      Patient completed antibiotic course previously  She is now completing a course of linezolid for possible community-acquired MRSA pneumonia  Continue linezolid x 7 days total, another 2 days, through 3/30  No other antibiotics  Monitor respiratory symptoms closely  Monitor temperature/WBC      2   Sepsis, POA   This is secondary to pneumonia above   Patient is systemically improved   Blood cultures remain negative  Antibiotic plan as in above  Monitor hemodynamics      3  Acute hypoxic respiratory failure   This is secondary to severe pneumonia above   Patient's respiratory status continues to improve   No further clinical signs of respiratory distress or failure  She is still requiring O2  She may need a brief course of home O2  O2 support per primary service  Monitor respiratory symptoms      4   Small pleural effusions   On prior CT, effusion appears to be small on free-flowing, unlikely to be empyema    Repeat CT also showed small effusion, without loculation   Empyema unlikely  Monitor for now      5   Leukocytosis   This may be secondary to persistent pneumonia above   Patient did receive 1 dose of corticosteroid previously   Persistent leukocytosis may be secondary to it   WBC has normalized    Monitor WBC      Discussed with the patient in detail regarding the above plan  Discussed with Dr Perfecto johnson service      Antibiotics:  Zyvox # 5     Subjective:  Patient is out of ICU, on floor  She continues to improve     Dyspnea improving, still on O2 support, via NC   Cough better, still nonproductive  Temperature stays down   No chills  She is tolerating antibiotics well   No nausea, vomiting or diarrhea    Objective:  Vitals:  HR:  [] 67  Resp:  [18-20] 18  BP: (110-124)/(56-66) 124/64  SpO2:  [91 %-95 %] 94 %  Temp (24hrs), Av 8 °F (37 1 °C), Min:98 6 °F (37 °C), Max:99 °F (37 2 °C)  Current: Temperature: 99 °F (37 2 °C)    Physical Exam:     General: Awake, alert, cooperative, no distress  Lungs: Expansion symmetric, improving basilar rales, no wheezing, respirations unlabored  Heart[de-identified]  Regular rate and rhythm, S1 and S2 normal, no murmur  Abdomen: Soft, nondistended, non-tender, bowel sounds active all four quadrants,        no masses, no organomegaly  Extremities: Trace edema  No erythema/warmth  No ulcer  Nontender to palpation  Skin:  No rash  Invasive Devices     Peripheral Intravenous Line            Peripheral IV 18 Left Antecubital 1 day                Labs studies:   I have personally reviewed pertinent labs      Results from last 7 days  Lab Units 18  0440 18  0520 18  0900   SODIUM mmol/L 138 137 139   POTASSIUM mmol/L 3 6 3 4* 3 2*   CHLORIDE mmol/L 104 101 102   CO2 mmol/L 27 27 28   ANION GAP mmol/L 7 9 9   BUN mg/dL 6 6 9   CREATININE mg/dL 0 86 0 79 0 87   EGFR ml/min/1 73sq m 92 102 91   GLUCOSE RANDOM mg/dL 118 133 112   CALCIUM mg/dL 8 4 8 3 8 2*       Results from last 7 days  Lab Units 18  0439 18  0520 18  0645   WBC Thousand/uL 9 85 12 77* 18 80*   HEMOGLOBIN g/dL 9 3* 9 0* 9 2*   PLATELETS Thousands/uL 290 255 201       Results from last 7 days  Lab Units 18  1410   MRSA CULTURE ONLY  No Methicillin Resistant Staphlyococcus aureus (MRSA) isolated       Imaging Studies:   I have personally reviewed pertinent imaging study reports and images in PACS  EKG, Pathology, and Other Studies:   I have personally reviewed pertinent reports

## 2018-03-28 NOTE — PROGRESS NOTES
Addendum: pt was 86% on 6 liters while ambulating  Pt wants to go home  Will hold on discharge until attending from pulmonary evaluates pt  Progress Note - Cyn Becerra 1990, 29 y o  female MRN: [id number]    Unit/Bed#: E5 -01 Encounter: [id number]    Primary Care Provider: Sarina See, DO   Date and time admitted to hospital: 3/19/2018  4:26 PM        Acute respiratory failure with hypoxia [facility])   Assessment & Plan    Secondary severe bilateral pneumonia and parapneumonic effusion  Treatment of this pneumonia is described above  Pt was weaned to nasal cannula  She will need home o2 eval prior to discharge  Pneumonia   Assessment & Plan     Multilobar pneumonia with a right-sided loculated parapneumonic effusion that was present on admission  Critical care and ID following  Possible post influenzae pneumonia  Pt is slowly improving  Continue current IV antibiotics  She will need 2 more days of antibiotics  HIV screening is negative  Leukocytosis has resolved  * Sepsis ([address] )   Assessment & Plan    PRESENT ON ADMISSION  Due to the pneumonia described above  Please see above about treatment plan  Hypokalemia   Assessment & Plan    replaced        Iron deficiency anemia   Assessment & Plan    H/h stable  Will continue to monitor              Discharge Plan: d/c home    Code Status: Level 1 - Full Code      Subjective:   Pt seen and examined  Pt reports some flank pain but only with walking and she was walking with respiratory with out problems  No f/c no cp no worsening sob no n/v/d no abd pain  She is still coughing at times  Objective:     Vitals:   Temp (24hrs), Av 8 °F (37 1 °C), Min:98 6 °F (37 °C), Max:99 °F (37 2 °C)    HR:  [] 67  Resp:  [18-20] 18  BP: (110-124)/(56-66) 124/64  SpO2:  [91 %-95 %] 94 %  Body mass index is 28 02 kg/m²       Input and Output Summary (last 24 hours):     No intake or output data in the 24 hours ending 18 1123    Physical Exam:     Physical Exam   Constitutional: She is oriented to person, place, and time  She appears well-developed and well-nourished  HENT:   Head: Normocephalic and atraumatic  Eyes: EOM are normal  Pupils are equal, round, and reactive to light  Neck: Normal range of motion  Neck supple  Cardiovascular: Normal rate and regular rhythm  Exam reveals no gallop and no friction rub  No murmur heard  Pulmonary/Chest: Effort normal    Mild coarse breath sounds   Abdominal: Soft  Bowel sounds are normal  She exhibits no distension  There is no tenderness  There is no rebound  Musculoskeletal: Normal range of motion  Neurological: She is alert and oriented to person, place, and time  Skin: Skin is warm and dry  Additional Data:     Labs:      Results from last 7 days  Lab Units 03/27/18  0439   WBC Thousand/uL 9 85   HEMOGLOBIN g/dL 9 3*   HEMATOCRIT % 28 3*   PLATELETS Thousands/uL 290       Results from last 7 days  Lab Units 03/27/18  0440   SODIUM mmol/L 138   POTASSIUM mmol/L 3 6   CHLORIDE mmol/L 104   CO2 mmol/L 27   BUN mg/dL 6   CREATININE mg/dL 0 86   CALCIUM mg/dL 8 4   GLUCOSE RANDOM mg/dL 118           * I Have Reviewed All Lab Data Listed Above            Last 24 Hours Medication List:     Current Facility-Administered Medications:  acetaminophen 650 mg Oral Q6H PRN Chase Jones PA-C    albuterol 2 puff Inhalation Q4H PRN Soyla Opitz, MD    albuterol 2 5 mg Nebulization Q6H PRN Chase Jones PA-C    ALPRAZolam 0 25 mg Oral TID PRN GILBERTO De La Rosa    enoxaparin 40 mg Subcutaneous Q24H [address] Jessa Vidal MD    ferrous sulfate 325 mg Oral Daily With Breakfast Sandra Corrales MD    guaiFENesin 600 mg Oral Q12H [address] Chase Jones PA-C    levalbuterol 1 25 mg Nebulization TID GILBERTO Roque    linezolid 600 mg Oral Q12H Ellyn Trevizo MD    ondansetron 8 mg Intravenous Q6H PRN GILBERTO De La Rosa Last Rate: 8 mg (03/24/18 1150)   pantoprazole 40 mg Intravenous Q24H [address] Louisa Deandres, CRNP    saccharomyces boulardii 250 mg Oral BID Louisa Carreras, LATANP    sodium chloride 3 mL Nebulization Q6H PRN Mya Villegas MD         Today, Patient Was Seen By: Danna Ma DO

## 2018-03-28 NOTE — SOCIAL WORK
CM was notified that pt is ready for d/c today  Pt will need home o2 and is on 6L on exertion  CM met with pt at bedside; CM suggested she may want to wait to d/c until her home o2 concentrator has been delivered to the home, because although order will be placed on rush service, it is unknown what time it will come tonight  Pt reports her sister Heidi Hodge and her father Joy Harris are home right now and willing to accept delivery  [employer] aware and will deliver concentrator as soon as possible  Also offered pt an ambulance to take her home, but pt declined this  Portable tanks were delivered to the room  Dr Eliana Harris signed pt's [place] paperwork and CM faxed back to [facility]

## 2018-03-29 NOTE — CASE MANAGEMENT
Notification of Discharge  This is a Notification of Discharge from our facility [address]  Please be advised that this patient has been discharge from our facility  Below you will find the admission and discharge date and time including the patients disposition  PRESENTATION DATE: 3/19/2018  4:26 PM  IP ADMISSION DATE: 3/19/18 1852  DISCHARGE DATE: 3/28/2018  8:21 PM  DISPOSITION: Home/Self Care    [address] [facility] in the [facility] by Jaspal Tao for 2017  Network Utilization Review Department  Phone: [phone]; Fax [phone]  ATTENTION: The Network Utilization Review Department is now centralized for our 7 Facilities  Make a note that we have a new phone and fax numbers for our Department  Please call with any questions or concerns to [phone] and carefully follow the prompts so that you are directed to the right person  All voicemails are confidential  Fax any determinations, approvals, denials, and requests for initial or continue stay review clinical to [phone]  Due to HIGH CALL volume, it would be easier if you could please send faxed requests to expedite your requests and in part, help us provide discharge notifications faster

## 2018-04-04 ENCOUNTER — APPOINTMENT (EMERGENCY)
Dept: CT IMAGING | Facility: HOSPITAL | Age: 28
End: 2018-04-04
Payer: COMMERCIAL

## 2018-04-04 ENCOUNTER — HOSPITAL ENCOUNTER (EMERGENCY)
Facility: HOSPITAL | Age: 28
Discharge: HOME | End: 2018-04-04
Attending: EMERGENCY MEDICINE | Admitting: EMERGENCY MEDICINE
Payer: COMMERCIAL

## 2018-04-04 VITALS
DIASTOLIC BLOOD PRESSURE: 58 MMHG | RESPIRATION RATE: 16 BRPM | SYSTOLIC BLOOD PRESSURE: 115 MMHG | TEMPERATURE: 97.5 F | OXYGEN SATURATION: 99 % | HEART RATE: 72 BPM

## 2018-04-04 DIAGNOSIS — J18.9 PNEUMONIA: ICD-10-CM

## 2018-04-04 DIAGNOSIS — K21.00 REFLUX ESOPHAGITIS: ICD-10-CM

## 2018-04-04 DIAGNOSIS — M54.9 BACK PAIN: Primary | ICD-10-CM

## 2018-04-04 LAB
ANION GAP SERPL CALCULATED.3IONS-SCNC: 11 MMOL/L (ref 4–13)
ATRIAL RATE: 78 BPM
BASOPHILS # BLD AUTO: 0.05 THOUSANDS/ΜL (ref 0–0.1)
BASOPHILS NFR BLD AUTO: 1 % (ref 0–1)
BILIRUB UR QL STRIP: NEGATIVE
BUN SERPL-MCNC: 9 MG/DL (ref 5–25)
CALCIUM SERPL-MCNC: 9.3 MG/DL (ref 8.3–10.1)
CHLORIDE SERPL-SCNC: 101 MMOL/L (ref 100–108)
CLARITY UR: NORMAL
CO2 SERPL-SCNC: 28 MMOL/L (ref 21–32)
COLOR UR: YELLOW
CREAT SERPL-MCNC: 0.88 MG/DL (ref 0.6–1.3)
EOSINOPHIL # BLD AUTO: 0.05 THOUSAND/ΜL (ref 0–0.61)
EOSINOPHIL NFR BLD AUTO: 1 % (ref 0–6)
ERYTHROCYTE [DISTWIDTH] IN BLOOD BY AUTOMATED COUNT: 16.3 % (ref 11.6–15.1)
EXT PREG TEST URINE: NORMAL
GFR SERPL CREATININE-BSD FRML MDRD: 90 ML/MIN/1.73SQ M
GLUCOSE SERPL-MCNC: 121 MG/DL (ref 65–140)
GLUCOSE UR STRIP-MCNC: NEGATIVE MG/DL
HCT VFR BLD AUTO: 35.7 % (ref 34.8–46.1)
HGB BLD-MCNC: 11.4 G/DL (ref 11.5–15.4)
HGB UR QL STRIP.AUTO: NEGATIVE
KETONES UR STRIP-MCNC: NEGATIVE MG/DL
LEUKOCYTE ESTERASE UR QL STRIP: NEGATIVE
LIPASE SERPL-CCNC: 156 U/L (ref 73–393)
LYMPHOCYTES # BLD AUTO: 1.33 THOUSANDS/ΜL (ref 0.6–4.47)
LYMPHOCYTES NFR BLD AUTO: 24 % (ref 14–44)
MCH RBC QN AUTO: 27.1 PG (ref 26.8–34.3)
MCHC RBC AUTO-ENTMCNC: 31.9 G/DL (ref 31.4–37.4)
MCV RBC AUTO: 85 FL (ref 82–98)
MONOCYTES # BLD AUTO: 0.31 THOUSAND/ΜL (ref 0.17–1.22)
MONOCYTES NFR BLD AUTO: 6 % (ref 4–12)
NEUTROPHILS # BLD AUTO: 3.83 THOUSANDS/ΜL (ref 1.85–7.62)
NEUTS SEG NFR BLD AUTO: 68 % (ref 43–75)
NITRITE UR QL STRIP: NEGATIVE
NRBC BLD AUTO-RTO: 0 /100 WBCS
P AXIS: 38 DEGREES
PH UR STRIP.AUTO: 6 [PH] (ref 4.5–8)
PLATELET # BLD AUTO: 521 THOUSANDS/UL (ref 149–390)
PMV BLD AUTO: 9.4 FL (ref 8.9–12.7)
POTASSIUM SERPL-SCNC: 3.7 MMOL/L (ref 3.5–5.3)
PR INTERVAL: 140 MS
PROT UR STRIP-MCNC: NEGATIVE MG/DL
QRS AXIS: 94 DEGREES
QRSD INTERVAL: 84 MS
QT INTERVAL: 398 MS
QTC INTERVAL: 453 MS
RBC # BLD AUTO: 4.21 MILLION/UL (ref 3.81–5.12)
SODIUM SERPL-SCNC: 140 MMOL/L (ref 136–145)
SP GR UR STRIP.AUTO: <=1.005 (ref 1–1.03)
T WAVE AXIS: -5 DEGREES
TROPONIN I SERPL-MCNC: <0.02 NG/ML
UROBILINOGEN UR QL STRIP.AUTO: 0.2 E.U./DL
VENTRICULAR RATE: 78 BPM
WBC # BLD AUTO: 5.57 THOUSAND/UL (ref 4.31–10.16)

## 2018-04-04 PROCEDURE — 99285 EMERGENCY DEPT VISIT HI MDM: CPT

## 2018-04-04 PROCEDURE — 81003 URINALYSIS AUTO W/O SCOPE: CPT

## 2018-04-04 PROCEDURE — 36415 COLL VENOUS BLD VENIPUNCTURE: CPT | Performed by: PODIATRIST

## 2018-04-04 PROCEDURE — 81002 URINALYSIS NONAUTO W/O SCOPE: CPT | Performed by: PODIATRIST

## 2018-04-04 PROCEDURE — 85025 COMPLETE CBC W/AUTO DIFF WBC: CPT | Performed by: PODIATRIST

## 2018-04-04 PROCEDURE — 93010 ELECTROCARDIOGRAM REPORT: CPT | Performed by: INTERNAL MEDICINE

## 2018-04-04 PROCEDURE — 80048 BASIC METABOLIC PNL TOTAL CA: CPT | Performed by: PODIATRIST

## 2018-04-04 PROCEDURE — 83690 ASSAY OF LIPASE: CPT | Performed by: PODIATRIST

## 2018-04-04 PROCEDURE — 81025 URINE PREGNANCY TEST: CPT | Performed by: PODIATRIST

## 2018-04-04 PROCEDURE — 71275 CT ANGIOGRAPHY CHEST: CPT

## 2018-04-04 PROCEDURE — 93005 ELECTROCARDIOGRAM TRACING: CPT

## 2018-04-04 PROCEDURE — 84484 ASSAY OF TROPONIN QUANT: CPT | Performed by: EMERGENCY MEDICINE

## 2018-04-04 RX ORDER — OMEPRAZOLE 20 MG/1
20 CAPSULE, DELAYED RELEASE ORAL DAILY
Qty: 7 CAPSULE | Refills: 0 | Status: SHIPPED | OUTPATIENT
Start: 2018-04-04 | End: 2018-05-15

## 2018-04-04 RX ORDER — FAMOTIDINE 20 MG/1
20 TABLET, FILM COATED ORAL
Qty: 7 TABLET | Refills: 0 | Status: SHIPPED | OUTPATIENT
Start: 2018-04-04 | End: 2018-05-15

## 2018-04-04 RX ORDER — CYCLOBENZAPRINE HCL 10 MG
5 TABLET ORAL 2 TIMES DAILY PRN
Qty: 7 TABLET | Refills: 0 | Status: SHIPPED | OUTPATIENT
Start: 2018-04-04 | End: 2018-05-15 | Stop reason: HOSPADM

## 2018-04-04 RX ADMIN — IOHEXOL 85 ML: 350 INJECTION, SOLUTION INTRAVENOUS at 12:36

## 2018-04-04 NOTE — ED PROVIDER NOTES
History  Chief Complaint   Patient presents with    Chest Pain     R sided chest pain that started two days ago  Also reports upper back pain  Patient is a 29year old female that presents to the emergency department for chief complaint of right sided back pain and chest tightness than began in the last 24 hours  Patient states that she was recently hospitalized for 11 days for pneumonia  She finished her abx course of zyvox yesterday  She has been using oxygen at home at 1L/hour but denies any shortness of breath or dyspnea on light exertion  Patient states the pain has been getting worse and this morning she states "the pain made me fall to my knees " Pain is exacerbated by movement, large inspirations and palpation  She denies any weakness, extremity edema or abdominal pain  No cough with exudate  Denies any trauma             None       Past Medical History:   Diagnosis Date    Depressed        Past Surgical History:   Procedure Laterality Date    BREAST LUMPECTOMY Right        No family history on file  I have reviewed and agree with the history as documented  Social History   Substance Use Topics    Smoking status: Never Smoker    Smokeless tobacco: Never Used    Alcohol use Yes      Comment: social        Review of Systems   Constitutional: Negative for chills and fever  HENT: Negative for sinus pain and sore throat  Eyes: Negative for visual disturbance  Respiratory: Positive for cough and chest tightness  Negative for shortness of breath  Cardiovascular: Positive for chest pain  Negative for palpitations  Gastrointestinal: Negative for abdominal pain, constipation, diarrhea, nausea and vomiting  Genitourinary: Negative for difficulty urinating  Musculoskeletal: Positive for myalgias  Neurological: Negative for weakness, light-headedness and headaches  Psychiatric/Behavioral: Negative for behavioral problems         Physical Exam  ED Triage Vitals   Temperature Pulse Respirations Blood Pressure SpO2   04/04/18 1049 04/04/18 1052 04/04/18 1049 04/04/18 1049 04/04/18 1052   97 5 °F (36 4 °C) 85 18 137/69 100 %      Temp src Heart Rate Source Patient Position - Orthostatic VS BP Location FiO2 (%)   -- 04/04/18 1236 04/04/18 1236 04/04/18 1236 --    Monitor Sitting Left arm       Pain Score       04/04/18 1049       7           Orthostatic Vital Signs  Vitals:    04/04/18 1049 04/04/18 1052 04/04/18 1236 04/04/18 1337   BP: 137/69  119/59 115/58   Pulse:  85 78 72   Patient Position - Orthostatic VS:   Sitting Sitting       Physical Exam   Constitutional: She is oriented to person, place, and time  Vital signs are normal  She appears well-developed and well-nourished  HENT:   Head: Normocephalic and atraumatic  Eyes: Conjunctivae, EOM and lids are normal  Pupils are equal, round, and reactive to light  Neck: Normal range of motion and full passive range of motion without pain  Neck supple  No edema present  Cardiovascular: Normal rate, regular rhythm, S1 normal, S2 normal and normal heart sounds  Pulmonary/Chest: Effort normal  No respiratory distress  She has no wheezes  Mild decreased breath sounds b/l but lung sounds clear without wheezes or rales  Abdominal: Soft  Bowel sounds are normal  She exhibits no distension  There is no tenderness  There is no guarding  Musculoskeletal: Normal range of motion  She exhibits no edema or tenderness  Right sided back pain with side to side movement of spine, most notably to left side and with flexion  Pain on palpation to right back musculature   Neurological: She is alert and oriented to person, place, and time  She has normal strength  No sensory deficit  Skin: Skin is warm, dry and intact  Psychiatric: She has a normal mood and affect  Her behavior is normal    Vitals reviewed        ED Medications  Medications   iohexol (OMNIPAQUE) 350 MG/ML injection (MULTI-DOSE) 85 mL (85 mL Intravenous Given 4/4/18 1236) Diagnostic Studies  Results Reviewed     Procedure Component Value Units Date/Time    POCT urinalysis dipstick [[id number]]  (Abnormal) Resulted:  04/04/18 1218    Lab Status:  Final result Specimen:  Urine Updated:  04/04/18 1218    POCT pregnancy, urine [[id number]]  (Normal) Resulted:  04/04/18 1218    Lab Status:  Final result Updated:  04/04/18 1218     EXT PREG TEST UR (Ref: Negative) HCG = neg (-)    ED Urine Macroscopic [[id number]]  (Normal) Collected:  04/04/18 1215    Lab Status:  Final result Specimen:  Urine Updated:  04/04/18 1216     Color, UA Yellow     Clarity, UA Slightly Cloudy     pH, UA 6 0     Leukocytes, UA Negative     Nitrite, UA Negative     Protein, UA Negative mg/dl      Glucose, UA Negative mg/dl      Ketones, UA Negative mg/dl      Urobilinogen, UA 0 2 E U /dl      Bilirubin, UA Negative     Blood, UA Negative     Specific Gravity, UA <=1 005    Narrative:       CLINITEK RESULT    Troponin I [[id number]]  (Normal) Collected:  04/04/18 1145    Lab Status:  Final result Specimen:  Blood from Arm, Left Updated:  04/04/18 1209     Troponin I <0 02 ng/mL     Narrative:         [employer] Chemistry analyzer 99% cutoff is > 0 04 ng/mL in network labs    o cTnI 99% cutoff is useful only when applied to patients in the clinical setting of myocardial ischemia  o cTnI 99% cutoff should be interpreted in the context of clinical history, ECG findings and possibly cardiac imaging to establish correct diagnosis  o cTnI 99% cutoff may be suggestive but clearly not indicative of a coronary event without the clinical setting of myocardial ischemia      Basic metabolic panel [[id number]] Collected:  04/04/18 1144    Lab Status:  Final result Specimen:  Blood from Arm, Left Updated:  04/04/18 1200     Sodium 140 mmol/L      Potassium 3 7 mmol/L      Chloride 101 mmol/L      CO2 28 mmol/L      Anion Gap 11 mmol/L      BUN 9 mg/dL      Creatinine 0 88 mg/dL      Glucose 121 mg/dL      Calcium 9 3 mg/dL      eGFR 90 ml/min/1 73sq m     Narrative:         National Kidney Disease Education Program recommendations are as follows:  GFR calculation is accurate only with a steady state creatinine  Chronic Kidney disease less than 60 ml/min/1 73 sq  meters  Kidney failure less than 15 ml/min/1 73 sq  meters  Lipase [[id number]]  (Normal) Collected:  04/04/18 1144    Lab Status:  Final result Specimen:  Blood from Arm, Left Updated:  04/04/18 1200     Lipase 156 u/L     CBC and differential [[id number]]  (Abnormal) Collected:  04/04/18 1144    Lab Status:  Final result Specimen:  Blood from Arm, Left Updated:  04/04/18 1151     WBC 5 57 Thousand/uL      RBC 4 21 Million/uL      Hemoglobin 11 4 (L) g/dL      Hematocrit 35 7 %      MCV 85 fL      MCH 27 1 pg      MCHC 31 9 g/dL      RDW 16 3 (H) %      MPV 9 4 fL      Platelets 919 (H) Thousands/uL      nRBC 0 /100 WBCs      Neutrophils Relative 68 %      Lymphocytes Relative 24 %      Monocytes Relative 6 %      Eosinophils Relative 1 %      Basophils Relative 1 %      Neutrophils Absolute 3 83 Thousands/µL      Lymphocytes Absolute 1 33 Thousands/µL      Monocytes Absolute 0 31 Thousand/µL      Eosinophils Absolute 0 05 Thousand/µL      Basophils Absolute 0 05 Thousands/µL                  CTA ED chest PE study   Final Result by India Ames MD (04/04 1253)   Substantially improved diffuse bilateral pulmonary infiltrates      Slightly diminished small bilateral pleural effusions and bibasal compressive atelectasis      Suspicion of reflux esophagitis, less conspicuous previously                     Workstation performed: [id number]               Procedures  Procedures      Phone Consults  ED Phone Contact    ED Course  ED Course as of Apr 04 1345 Wed Apr 04, 2018   1321 Ct reviewed-no PE  bilateral pulmonary infiltrates improved but shows suspicion of reflux esophagitis    1337 Patient appears well and in no distress on re-evaluation  Will discharge to home   Has follow-up with PCP tomorrow                                MDM  Number of Diagnoses or Management Options  Back pain:   Pneumonia:   Reflux esophagitis:   Diagnosis management comments: 29year old female with complaints of back pain and chest tightness who was recently hospitalized for extended period for pneumonia  At this time, patient is moderate risk for pulmonary embolism given recent hospitalization and immobilization  Will order CT to assess  Will also evaluate pneumonia on CT and its course  CT read was reviewed and no PE observed  CT showed esophagitis  Will Rx pepcid at nighttime and prilosec during daytime    -EKG, CBC, BMP, urine within normal limits  -fortunately, patient has good O2 sats on room air and lungs sound clear and is in no distress  -differentials can include muscle strain/spasm of back in addition to pneumonia-will Rx flexeril   -recommend follow-up with PCP (has appt tomorrow) and pulmonology       Amount and/or Complexity of Data Reviewed  Clinical lab tests: ordered and reviewed  Tests in the radiology section of CPT®: ordered and reviewed      CritCare Time    Disposition  Final diagnoses:   Pneumonia   Reflux esophagitis   Back pain     Time reflects when diagnosis was documented in both MDM as applicable and the Disposition within this note     Time User Action Codes Description Comment    4/4/2018  1:24 PM Zahraa Jenise H Add [J18 9] Pneumonia     4/4/2018  1:24 PM Jeri Burleson Add [K21 0] Reflux esophagitis     4/4/2018  1:25 PM Zahraa Jenise H Add [M54 9] Back pain     4/4/2018  1:25 PM Zahraa Jenise H Modify [J18 9] Pneumonia     4/4/2018  1:25 PM Zahraa Jenise H Modify [J18 9] Pneumonia     4/4/2018  1:41 PM Zahraa Jenise H Modify [J18 9] Pneumonia     4/4/2018  1:41 PM Zahraa Jenise H Modify [M54 9] Back pain       ED Disposition     ED Disposition Condition Comment    Discharge  Lisset Cope discharge to home/self care      Condition at discharge: Good        Follow-up Information Follow up With Specialties Details Why Contact Info Additional Reginatr  [address], DO Family Medicine   [address] [facility] [place] [facility] [address]  [phone]       [facility] Emergency Department Emergency Medicine  If symptoms worsen [address] [facility]  [phone] [place] ED, [address] [facility] [address]  , [place], [place], [address], DO Family Medicine Schedule an appointment as soon as possible for a visit in 1 day  [address] [facility] [place] [facility] [address] [facility]  [phone]           Discharge Medication List as of 4/4/2018  1:32 PM      START taking these medications    Details   cyclobenzaprine (FLEXERIL) 10 mg tablet Take 0 5 tablets (5 mg total) by mouth 2 (two) times a day as needed for muscle spasms, Starting Wed 4/4/2018, Normal      famotidine (PEPCID) 20 mg tablet Take 1 tablet (20 mg total) by mouth daily at bedtime as needed for heartburn, Starting Wed 4/4/2018, Print      omeprazole (PriLOSEC) 20 mg delayed release capsule Take 1 capsule (20 mg total) by mouth daily, Starting Wed 4/4/2018, Print           No discharge procedures on file  ED Provider  Attending physically available and evaluated Bere Talley I managed the patient along with the ED Attending      Electronically Signed by         Africa Wood  04/04/18 9292

## 2018-04-04 NOTE — ED ATTENDING ATTESTATION
Lopez Garcia MD, saw and evaluated the patient  I have discussed the patient with the resident/non-physician practitioner and agree with the resident's/non-physician practitioner's findings, Plan of Care, and MDM as documented in the resident's/non-physician practitioner's note, except where noted  All available labs and Radiology studies were reviewed  At this point I agree with the current assessment done in the Emergency Department  I have conducted an independent evaluation of this patient a history and physical is as follows:  Pain in the back which is somewhat pleuritic  Recent pneumonia with hospitalization for at least 10 days  She was getting Lovenox at that time  She was on oxygen for point  She is not short of breath  Pain is rather sharp worse with movement and with deep breathing  No leg pain or tenderness  Lungs are clear    Rule out PE    Critical Care Time  CritCare Time    Procedures

## 2018-04-04 NOTE — DISCHARGE INSTRUCTIONS
Back Pain   WHAT YOU NEED TO KNOW:   Back pain is common  It can be caused by many conditions, such as arthritis or the breakdown of spinal discs  Your risk for back pain is increased by injuries, lack of activity, or repeated bending and twisting  You may feel sore or stiff on one or both sides of your back  The pain may spread to your buttocks or thighs  DISCHARGE INSTRUCTIONS:   Medicines:   · NSAIDs  help decrease swelling and pain  This medicine is available with or without a doctor's order  NSAIDs can cause stomach bleeding or kidney problems in certain people  If you take blood thinner medicine, always ask your healthcare provider if NSAIDs are safe for you  Always read the medicine label and follow directions  · Acetaminophen  decreases pain  It is available without a doctor's order  Ask how much to take and how often to take it  Follow directions  Acetaminophen can cause liver damage if not taken correctly  · Prescription pain medicine  may be given  Ask your healthcare provider how to take this medicine safely  · Take your medicine as directed  Contact your healthcare provider if you think your medicine is not helping or if you have side effects  Tell him or her if you are allergic to any medicine  Keep a list of the medicines, vitamins, and herbs you take  Include the amounts, and when and why you take them  Bring the list or the pill bottles to follow-up visits  Carry your medicine list with you in case of an emergency  Follow up with your healthcare provider in 2 weeks, or as directed:  Write down your questions so you remember to ask them during your visits  How to manage your back pain:   · Apply ice  on your back or affected area for 15 to 20 minutes every hour or as directed  Use an ice pack, or put crushed ice in a plastic bag  Cover it with a towel  Ice helps prevent tissue damage and decreases pain      · Apply heat  on your back or affected area for 20 to 30 minutes every 2 hours for as many days as directed  Heat helps decrease pain and muscle spasms  · Stay active  as much as you can without causing more pain  Bed rest could make your back pain worse  Avoid heavy lifting until your pain is gone  Return to the emergency department if:   · You have pain, numbness, or weakness in one or both legs  · Your pain becomes so severe that you cannot walk  · You cannot control your urine or bowel movements  · You have severe back pain with chest pain  · You have severe back pain, nausea, and vomiting  · You have severe back pain that spreads to your side or genital area  Contact your healthcare provider if:   · You have back pain that does not get better with rest and pain medicine  · You have a fever  · You have pain that worsens when you are on your back or when you rest     · You have pain that worsens when you cough or sneeze  · You lose weight without trying  · You have questions or concerns about your condition or care  © 2017 [address] [employer] Information is for End User's use only and may not be sold, redistributed or otherwise used for commercial purposes  All illustrations and images included in CareNotes® are the copyrighted property of [employer]  or [employer]  The above information is an  only  It is not intended as medical advice for individual conditions or treatments  Talk to your doctor, nurse or pharmacist before following any medical regimen to see if it is safe and effective for you  Esophagitis   WHAT YOU NEED TO KNOW:   Esophagitis is inflammation or irritation of the lining of the esophagus  DISCHARGE INSTRUCTIONS:   Call 911 for any of the following:   · You have chest pain that does not go away within a few minutes or gets worse  Return to the emergency department if:   · You feel like you have food stuck in your throat and you cannot cough it out      Contact your healthcare provider if: · You have new or worsening symptoms, even after treatment  · You have questions or concerns about your condition or care  Medicines:   · Medicines  may be given to fight an infection or to control stomach acid  · Take your medicine as directed  Contact your healthcare provider if you think your medicine is not helping or if you have side effects  Tell him of her if you are allergic to any medicine  Keep a list of the medicines, vitamins, and herbs you take  Include the amounts, and when and why you take them  Bring the list or the pill bottles to follow-up visits  Carry your medicine list with you in case of an emergency  Follow up with your healthcare provider as directed: You may need ongoing tests or treatment  Write down your questions so you remember to ask them during your visits  Do not smoke:  Nicotine and other chemicals in cigarettes and cigars can cause blood vessel and lung damage  Ask your healthcare provider for information if you currently smoke and need help to quit  E-cigarettes or smokeless tobacco still contain nicotine  Talk to your healthcare provider before you use these products  Do not drink alcohol:  Alcohol can irritate your esophagus  Talk to your healthcare provider if you need help to stop drinking  Keep batteries and similar objects out of the reach of children:  Babies often put items in their mouths to explore them  Button batteries are easy to swallow and can cause serious damage  Keep the battery covers of electronic devices such as remote controls taped closed  Store all batteries and toxic materials where children cannot get to them  Use childproof locks to keep children away from dangerous materials  Manage or prevent esophagitis:   · Limit or do not eat foods that can lead to esophagitis  Foods such as oranges and salsa can irritate your esophagus  Caffeine and chocolate can cause acid reflux  High-fat and fried foods make your stomach digest food more slowly  This increases the amount of stomach acid your esophagus is exposed to  Eat small meals, and drink water with your meals  Soft foods such as yogurt and applesauce may help soothe your throat  Do not eat for at least 3 hours before you go to bed  · Prevent acid reflux  Do not bend over unless it is necessary  Acid may back up into your esophagus when you bend over  If possible, keep the head of your bed elevated while you sleep  This will help keep acid from backing up  Manage stress  Stress can make your symptoms worse or cause stomach acid to back up  · Drink more liquid when you take pills  Drink a full glass of water when you take your pills  Ask your healthcare provider if you can take your pills at least an hour before you go to bed  © 2017 [facility] Information is for End User's use only and may not be sold, redistributed or otherwise used for commercial purposes  All illustrations and images included in CareNotes® are the copyrighted property of [employer]  or [employer] Tao  The above information is an  only  It is not intended as medical advice for individual conditions or treatments  Talk to your doctor, nurse or pharmacist before following any medical regimen to see if it is safe and effective for you  Pneumonia   WHAT YOU NEED TO KNOW:   Pneumonia is an infection in your lungs caused by bacteria, viruses, fungi, or parasites  You can become infected if you come in contact with someone who is sick  You can get pneumonia if you recently had surgery or needed a ventilator to help you breathe  Pneumonia can also be caused by accidentally inhaling saliva or small pieces of food  Pneumonia may cause mild symptoms, or it can be severe and life-threatening  DISCHARGE INSTRUCTIONS:   Seek care immediately if:   · You cough up blood  · Your heart beats more than 100 beats in 1 minute       · You are very tired, confused, and cannot think clearly  · You have chest pain or trouble breathing  · Your lips or fingernails turn gray or blue  Contact your healthcare provider if:   · Your symptoms are the same or get worse 48 hours after you start antibiotics  · Your fever is not below 99°F (37 2°C) 48 hours after you start antibiotics  · You have a fever higher than 101°F (38 3°C)  · You cannot eat, or you have loss of appetite, nausea, or are vomiting  · You have questions or concerns about your condition or care  Medicines:   · Antibiotics  treat pneumonia caused by bacteria  · Acetaminophen  decreases pain and fever  It is available without a doctor's order  Ask how much to take and how often to take it  Follow directions  Read the labels of all other medicines you are using to see if they also contain acetaminophen, or ask your doctor or pharmacist  Acetaminophen can cause liver damage if not taken correctly  Do not use more than 4 grams (4,000 milligrams) total of acetaminophen in one day  · NSAIDs , such as ibuprofen, help decrease swelling, pain, and fever  This medicine is available with or without a doctor's order  NSAIDs can cause stomach bleeding or kidney problems in certain people  If you take blood thinner medicine, always ask your healthcare provider if NSAIDs are safe for you  Always read the medicine label and follow directions  · Take your medicine as directed  Contact your healthcare provider if you think your medicine is not helping or if you have side effects  Tell him or her if you are allergic to any medicine  Keep a list of the medicines, vitamins, and herbs you take  Include the amounts, and when and why you take them  Bring the list or the pill bottles to follow-up visits  Carry your medicine list with you in case of an emergency  Follow up with your healthcare provider as directed: You will need to return for more tests  Write down your questions so you remember to ask them during your visits  Manage your symptoms:   · Rest as needed  Rest often throughout the day  Alternate times of activity with times of rest     · Drink liquids as directed  Ask how much liquid to drink each day and which liquids are best for you  Liquids help thin your mucus, which may make it easier for you to cough it up  · Do not smoke  Avoid secondhand smoke  Smoking increases your risk for pneumonia  Smoking also makes it harder for you to get better after you have had pneumonia  Ask your healthcare provider for information if you need help to quit smoking  · Use a cool mist humidifier  A humidifier will help increase air moisture in your home  This may make it easier for you to breathe and help decrease your cough  · Keep your head elevated  You may be able to breathe better if you lie down with the head of your bed up  Prevent pneumonia:   · Prevent the spread of germs  Wash your hands often with soap and water  Use gel hand cleanser when there is no soap and water available  Do not touch your eyes, nose, or mouth unless you have washed your hands first  Cover your mouth when you cough  Cough into a tissue or your shirtsleeve so you do not spread germs from your hands  If you are sick, stay away from others as much as possible  · Limit alcohol  Women should limit alcohol to 1 drink a day  Men should limit alcohol to 2 drinks a day  A drink of alcohol is 12 ounces of beer, 5 ounces of wine, or 1½ ounces of liquor  · Ask about vaccines  You may need a vaccine to help prevent pneumonia  Get an influenza (flu) vaccine every year as soon as it becomes available  © 2017 [address] [employer] Information is for End User's use only and may not be sold, redistributed or otherwise used for commercial purposes  All illustrations and images included in CareNotes® are the copyrighted property of [employer] , [employer]  or [employer]aspal Tao  The above information is an  only   It is not intended as medical advice for individual conditions or treatments  Talk to your doctor, nurse or pharmacist before following any medical regimen to see if it is safe and effective for you  Cyclobenzaprine (By mouth)   Cyclobenzaprine (rti-[id number]-gladys-preen)  Treats pain and stiffness caused by muscle spasms  Brand Name(s): Amrix, Cyclo/Son 10/300 Pack, Cyclobenzaprine Comfort Pac, CyclobenzaprinePax, Fexmid, FlexePax, FusePaq Tabradol, RapidPaq Tabradol   There may be other brand names for this medicine  When This Medicine Should Not Be Used: This medicine is not right for everyone  Do not use it if you had an allergic reaction to cyclobenzaprine  How to Use This Medicine:   Long Acting Capsule, Liquid, Tablet  · Your doctor will tell you how much medicine to use  Do not use more than directed  · Take this medicine at the same time each day  · Swallow the extended-release capsule whole  Do not crush, break, or chew it  · If you cannot swallow the capsule whole, you may open the capsule and sprinkle the contents over one tablespoon of applesauce  Swallow the mixture right away without chewing  Rinse the mouth to make sure all of the medicine have been swallowed  Do not save any of the mixture to use later  · This medicine is not for long-term use  · Missed dose: Take a dose as soon as you remember  If it is almost time for your next dose, wait until then and take a regular dose  Do not take extra medicine to make up for a missed dose  · Store the medicine in a closed container at room temperature, away from heat, moisture, and direct light  Drugs and Foods to Avoid:   Ask your doctor or pharmacist before using any other medicine, including over-the-counter medicines, vitamins, and herbal products  · Do not use this medicine if you have used an MAO inhibitor (MAOI) within 14 days of each other  · Some foods and medicines can affect how this medicine works   Tell your doctor if you are using any of the following:   ¨ Bupropion, guanethidine, meperidine, tramadol, verapamil  ¨ Medicine to treat depression (including amitriptyline, imipramine)  · Do not drink alcohol while you are using this medicine  · Tell your doctor if you use anything else that makes you sleepy  Some examples are allergy medicine, narcotic pain medicine, and alcohol  Warnings While Using This Medicine:   · Tell your doctor if you are pregnant or breastfeeding, or if you have liver problems, congestive heart failure, heart rhythm problems, a recent heart attack, overactive thyroid, or a history of glaucoma or trouble urinating  · This medicine may cause the following problems:  ¨ Serotonin syndrome, when taken with certain medicines  · This medicine may make you dizzy or drowsy  Do not drive or doing anything that could be dangerous until you know how this medicine affects you  · Call your doctor if your symptoms do not improve or if they get worse  · Keep all medicine out of the reach of children  Never share your medicine with anyone  Possible Side Effects While Using This Medicine:   Call your doctor right away if you notice any of these side effects:  · Allergic reaction: Itching or hives, swelling in your face or hands, swelling or tingling in your mouth or throat, chest tightness, trouble breathing  · Anxiety, restlessness, fever, sweating, twitching, nausea, vomiting, diarrhea, seeing or hearing things that are not there  · Fast, pounding, or uneven heartbeat  · Severe drowsiness, fainting, or confusion  If you notice these less serious side effects, talk with your doctor:   · Dizziness  · Dry mouth  If you notice other side effects that you think are caused by this medicine, tell your doctor  Call your doctor for medical advice about side effects   You may report side effects to FDA at 9-880-FDA-0805  © 2017 [address] [employer] Information is for End User's use only and may not be sold, redistributed or otherwise used for commercial purposes  The above information is an  only  It is not intended as medical advice for individual conditions or treatments  Talk to your doctor, nurse or pharmacist before following any medical regimen to see if it is safe and effective for you  Famotidine (By mouth)   Famotidine ([id number]-deepak)  Treats ulcers, gastroesophageal reflux disease (GERD), and conditions that cause the stomach to produce too much stomach acid  Also treats heartburn caused by acid indigestion  Brand Name(s): Acid Controller, Acid Reducer, [facility] Acid Reducer, Good Sense Acid Reducer, Heartburn Relief, Leader Acid Reducer, Pepcid, Pepcid AC, Quality Choice Acid Controller, [facility] Acid Reducer, [facility] Famotidine Acid Reducer, TopCare Acid Reducer   There may be other brand names for this medicine  When This Medicine Should Not Be Used: You should not use this medicine if you have had an allergic reaction to famotidine or to similar medicines such as ranitidine (Zantac®), cimetidine (Tagamet®), or nizatidine (Axid®)  How to Use This Medicine:   Tablet, Chewable Tablet, Dissolving Tablet, Liquid  · Your doctor will tell you how much medicine to use  Do not use more than directed  · Follow the instructions on the medicine label if you are using this medicine without a prescription  · The chewable tablet must be chewed completely before you swallow it  · If you are using the disintegrating tablet, make sure your hands are dry before you handle the tablet  Do not open the blister pack that contains the tablet until you are ready to take it  Remove the tablet from the blister pack by peeling back the foil, then taking the tablet out  Do not push the tablet through the foil  Place the tablet in your mouth  It should melt within 2 minutes  Swallow after the tablet has melted  · Shake the oral liquid medicine for 5 to 10 seconds before each use   Measure the medicine with a marked measuring spoon or medicine cup  If a dose is missed:   · Take a dose as soon as you remember  If it is almost time for your next dose, wait until then and take a regular dose  Do not take extra medicine to make up for a missed dose  How to Store and Dispose of This Medicine:   · Store the medicine in a closed container at room temperature, away from heat, moisture, and direct light  Do not freeze the oral liquid  · Ask your pharmacist, doctor, or health caregiver about the best way to dispose of any outdated medicine or medicine no longer needed  Throw away any unused oral liquid that is more than 3month old  · Keep all medicine out of the reach of children  Never share your medicine with anyone  Drugs and Foods to Avoid:      Ask your doctor or pharmacist before using any other medicine, including over-the-counter medicines, vitamins, and herbal products  Warnings While Using This Medicine:   · Make sure your doctor knows if you are pregnant or breastfeeding, or if you have kidney disease or liver disease  · This medicine might contain phenylalanine (aspartame)  This is only a concern if you have a disorder called phenylketonuria (a problem with amino acids)  If you have this condition, talk to your doctor before using this medicine  · Call your doctor if your symptoms do not improve or if they get worse  Possible Side Effects While Using This Medicine:   Call your doctor right away if you notice any of these side effects:  · Allergic reaction: Itching or hives, swelling in your face or hands, swelling or tingling in your mouth or throat, chest tightness, trouble breathing  · Blistering, peeling, or red skin rash  · Dark-colored urine or pale stools  · Fast, pounding, or uneven heartbeat  · Fever, chills, cough, sore throat, and body aches  · Seizures  · Unusual bleeding, bruising, or weakness  · Yellowing of your skin or the whites of your eyes    If you notice these less serious side effects, talk with your doctor:   · Constipation, diarrhea, or upset stomach  · Headache or dizziness  · Nausea or vomiting  If you notice other side effects that you think are caused by this medicine, tell your doctor  Call your doctor for medical advice about side effects  You may report side effects to FDA at 5-945-FDA-9705  © 2017 [address] [employer] Information is for End User's use only and may not be sold, redistributed or otherwise used for commercial purposes  The above information is an  only  It is not intended as medical advice for individual conditions or treatments  Talk to your doctor, nurse or pharmacist before following any medical regimen to see if it is safe and effective for you  Omeprazole (By mouth)   Omeprazole (qv-ERQ-nb-zole)  Treats heartburn, a damaged esophagus, stomach ulcers, and gastroesophageal reflux disease (GERD)  This medicine is a proton pump inhibitor (PPI)  Brand Name(s): First-Omeprazole, [facility] Omeprazole, Good Sense Omeprazole, Leader Omeprazole, Omeclamox-Charly, PrevidolRx Analgesic Charly, PriLOSEC, PriLOSEC OTC, Quality Choice Omeprazole Magnesium, [facility] Omeprazole, [facility] Omeprazole, TopCare Omeprazole   There may be other brand names for this medicine  When This Medicine Should Not Be Used: This medicine is not right for everyone  Do not use it if you had an allergic reaction to omeprazole or similar medicines  How to Use This Medicine:   Delayed Release Capsule, Packet, Powder for Suspension, Delayed Release Tablet  · Your doctor will tell you how much medicine to use  Do not use more than directed  · This medicine should come with a Medication Guide  Ask your pharmacist for a copy if you do not have one  · Follow the instructions on the medicine label if you are using this medicine without a prescription   If you are using the over-the-counter medicine, do not take it for more than 14 days or use the treatment more often than every 4 months unless your doctor tells you to  · Take this medicine at least 1 hour before a meal and for the full time of treatment, even if you begin to feel better after a few days  · Capsule or tablet:   ¨ Swallow whole  Do not crush or chew it  ¨ If you cannot swallow the capsule, you may open it and pour the medicine into a small amount of applesauce  Stir this mixture well and swallow it without chewing  To help make sure you get the full dose, drink a full glass of water  · Oral packet:   ¨ Mix the contents of a 2 5-milligram (mg) packet with 5 milliliters (mL) of water or mix the contents of a 10-mg packet with 15 mL of water  Do not use other liquids or food  ¨ Stir well  Let the mixture thicken for 2 to 3 minutes  ¨ Stir again and drink the medicine within 30 minutes  ¨ If there is any medicine left, add more water, stir, and drink immediately  § To prepare the oral packet for a feeding tube:   § Add 5 mL of water to a catheter-tipped syringe  Then add the contents of a 2 5-mg packet (or use 15 mL of water for the 10-mg packet)  § Shake the syringe right away  Let the mixture thicken for 2 to 3 minutes  § Shake the syringe once more  Give the medicine through the tube within 30 minutes  § Refill the syringe with an equal amount of water and shake it  Use the water to flush the tube to make sure all the medicine is given  · Missed dose: Take a dose as soon as you remember  If it is almost time for your next dose, wait until then and take a regular dose  Do not take extra medicine to make up for a missed dose  · Store the medicine in a closed container at room temperature, away from heat, moisture, and direct light  Drugs and Foods to Avoid:   Ask your doctor or pharmacist before using any other medicine, including over-the-counter medicines, vitamins, and herbal products  · Do not use omeprazole if you are also using medicines that contain rilpivirine    · Some foods and medicines can affect how omeprazole works  Tell your doctor if you are using any of the following:  ¨ Amoxicillin, atazanavir, cilostazol, citalopram, clarithromycin, clopidogrel, cyclosporine, dasatinib, digoxin, disulfiram, erlotinib, itraconazole, ketoconazole, methotrexate, mycophenolate mofetil, nelfinavir, nilotinib, phenytoin, rifampin, saquinavir, Javi's wort, tacrolimus, voriconazole  ¨ Benzodiazepine medicine (including diazepam)  ¨ Blood thinner (including warfarin)  ¨ Diuretic (water pill)  ¨ Iron supplements  Warnings While Using This Medicine:   · Tell your doctor if you are pregnant or breastfeeding, or if you have liver disease, lupus, or osteoporosis  · This medicine may cause the following problems:   ¨ Kidney problems  ¨ Low vitamin B12 or magnesium levels in the blood  ¨ Increased risk of broken bones in the hip, wrist, or spine  · This medicine can cause diarrhea  Call your doctor if the diarrhea becomes severe, does not stop, or is bloody  Do not take any medicine to stop diarrhea until you have talked to your doctor  Diarrhea can occur 2 months or more after you stop taking this medicine  · Tell any doctor or dentist who treats you that you are using this medicine  This medicine may affect certain medical test results  · Your doctor will check your progress and the effects of this medicine at regular visits  Keep all appointments  · Keep all medicine out of the reach of children  Never share your medicine with anyone    Possible Side Effects While Using This Medicine:   Call your doctor right away if you notice any of these side effects:  · Allergic reaction: Itching or hives, swelling in your face or hands, swelling or tingling in your mouth or throat, chest tightness, trouble breathing  · Blistering, peeling, red skin rash  · Fever, joint pain, swelling in the body, unusual weight gain, change in how much or how often you urinate  · Joint pain, rash on your cheeks or arms that gets worse in the sun  · Seizures, dizziness, uneven heartbeat, muscle cramps or twitching  · Severe diarrhea, stomach cramps, fever  · Stomach pain, nausea, vomiting, weight loss  If you notice these less serious side effects, talk with your doctor:   · Headache  · Mild diarrhea or stomach pain  If you notice other side effects that you think are caused by this medicine, tell your doctor  Call your doctor for medical advice about side effects  You may report side effects to FDA at 1-183-FDA-3753  © 2017 [address] [employer] Information is for End User's use only and may not be sold, redistributed or otherwise used for commercial purposes  The above information is an  only  It is not intended as medical advice for individual conditions or treatments  Talk to your doctor, nurse or pharmacist before following any medical regimen to see if it is safe and effective for you

## 2018-05-09 ENCOUNTER — APPOINTMENT (OUTPATIENT)
Dept: RADIOLOGY | Facility: MEDICAL CENTER | Age: 28
End: 2018-05-09
Payer: COMMERCIAL

## 2018-05-09 ENCOUNTER — TRANSCRIBE ORDERS (OUTPATIENT)
Dept: ADMINISTRATIVE | Facility: HOSPITAL | Age: 28
End: 2018-05-09

## 2018-05-09 DIAGNOSIS — J18.9 PNEUMONIA: ICD-10-CM

## 2018-05-09 PROCEDURE — 71046 X-RAY EXAM CHEST 2 VIEWS: CPT

## 2018-05-15 ENCOUNTER — OFFICE VISIT (OUTPATIENT)
Dept: PULMONOLOGY | Facility: CLINIC | Age: 28
End: 2018-05-15
Payer: COMMERCIAL

## 2018-05-15 VITALS
SYSTOLIC BLOOD PRESSURE: 118 MMHG | BODY MASS INDEX: 28.35 KG/M2 | OXYGEN SATURATION: 99 % | HEIGHT: 63 IN | TEMPERATURE: 98.9 F | RESPIRATION RATE: 18 BRPM | HEART RATE: 74 BPM | WEIGHT: 160 LBS | DIASTOLIC BLOOD PRESSURE: 68 MMHG

## 2018-05-15 DIAGNOSIS — J18.9 PNEUMONIA OF BOTH LUNGS DUE TO INFECTIOUS ORGANISM, UNSPECIFIED PART OF LUNG: Primary | ICD-10-CM

## 2018-05-15 DIAGNOSIS — J96.01 ACUTE RESPIRATORY FAILURE WITH HYPOXIA (HCC): ICD-10-CM

## 2018-05-15 DIAGNOSIS — J45.990 EXERCISE-INDUCED ASTHMA: ICD-10-CM

## 2018-05-15 PROCEDURE — 99214 OFFICE O/P EST MOD 30 MIN: CPT | Performed by: INTERNAL MEDICINE

## 2018-05-15 RX ORDER — NORGESTIMATE AND ETHINYL ESTRADIOL 7DAYSX3 LO
1 KIT ORAL
COMMUNITY
Start: 2018-05-09 | End: 2018-05-15

## 2018-05-15 RX ORDER — TRAMADOL HYDROCHLORIDE 50 MG/1
TABLET ORAL
COMMUNITY
Start: 2018-04-10 | End: 2018-05-15

## 2018-05-15 NOTE — ASSESSMENT & PLAN NOTE
Continue albuterol p r n   On a counseled patient to use her albuterol 10-15 minutes prior to her workup with gym and also as needed

## 2018-05-15 NOTE — PROGRESS NOTES
Progress note - Pulmonary Medicine   Ivan Nguyen 29 y o  female MRN: [id number]       Impression & Plan:     Pneumonia  Resolved completely and also on chest x-ray  No further intervention needed at this time  Acute respiratory failure with hypoxia (HCC)  Resolved, was secondary to her pneumonia which was treated appropriately  Exercise-induced asthma  Continue albuterol p r n   On a counseled patient to use her albuterol 10-15 minutes prior to her workup with gym and also as needed  I gave patient note to go back to work full time as per her request     ______________________________________________________________________    HPI:    Ivan Nguyen presents today for follow-up of recent multi lobar pneumonia  Patient was admitted in mid March this year with severe multi lobar pneumonia and acute hypoxic respiratory failure, she required prolonged hospitalization and IV antibiotics including vancomycin then switched to Zyvox as suspected to have Staph aureus pneumonia post viral infection  She improved and she was discharged home on oxygen, she recovered completely and presented today for follow-up  Patient states that she feels fine at baseline of her health status, denies any cough or sputum production, denies any chest pain, denies any fever or chills or night sweats, denies any weight loss, denies any shortness of breath but even prior to the pneumonia she always had some shortness of breath with mild wheezing only with exertion when she works out at [employer]  She currently has albuterol which makes her feel better and resolved her symptoms  She has asthma and her family with her aunts  She never smoked cigarettes  Review of Systems:  Review of Systems   Constitutional: Negative  HENT: Negative  Eyes: Negative  Respiratory:        As HPI   Cardiovascular: Negative  Gastrointestinal: Negative  Endocrine: Negative  Genitourinary: Negative      Musculoskeletal: Negative  Skin: Negative  Allergic/Immunologic: Negative  Neurological: Negative  Hematological: Negative  Psychiatric/Behavioral: Negative  Past medical history, surgical history, and family history were reviewed and updated as appropriate    Social history updates:  History   Smoking Status    Never Smoker   Smokeless Tobacco    Never Used       PhysicalExamination:  Vitals:   /68 (BP Location: Left arm, Patient Position: Sitting)   Pulse 74   Temp 98 9 °F (37 2 °C) (Tympanic)   Resp 18   Ht 5' 3" (1 6 m)   Wt 72 6 kg (160 lb)   SpO2 99%   BMI 28 34 kg/m²     General: alert, not in acute distress  HEENT: PERRL, no icteric sclera or cyanosis, no thrush  Neck:  Supple, no lymphadenopathy or thyromegaly, no JVD  Lungs:  Equal breath sounds and clear auscultations bilaterally, no wheezing or crackles  Heart: S1S2 regular, no murmures or gallops  Abdomen: soft, non-tender, bowel sounds  present  Extrimities: no edema, no clubbing or cyanosis  Neuro: Alert and oriented x 3, no focal neurodeficits   Skin: intact, no rashes    Diagnostic Data:  Labs:   I personally reviewed the most recent laboratory data pertinent to today's visit    Lab Results   Component Value Date    WBC 5 57 04/04/2018    HGB 11 4 (L) 04/04/2018    HCT 35 7 04/04/2018    MCV 85 04/04/2018     (H) 04/04/2018     Lab Results   Component Value Date    GLUCOSE 121 04/04/2018    CALCIUM 9 3 04/04/2018     04/04/2018    K 3 7 04/04/2018    CO2 28 04/04/2018     04/04/2018    BUN 9 04/04/2018    CREATININE 0 88 04/04/2018     No results found for: IGE  Lab Results   Component Value Date    ALT 18 03/19/2018    AST 27 03/19/2018    ALKPHOS 60 03/19/2018    BILITOT 0 90 03/19/2018       Imaging:  I personally reviewed the images on the [facility] system pertinent to today's visit  Chest x-ray from May 9 reviewed on [facility] also with patient:  Clear lungs with no acute pathology  I also reviewed previous x-rays and CT scans with patient on PACs to show her her pneumonia          Carlos La MD

## 2018-07-20 ENCOUNTER — HOSPITAL ENCOUNTER (EMERGENCY)
Facility: HOSPITAL | Age: 28
Discharge: HOME | End: 2018-07-20
Attending: EMERGENCY MEDICINE | Admitting: EMERGENCY MEDICINE
Payer: COMMERCIAL

## 2018-07-20 ENCOUNTER — TELEPHONE (OUTPATIENT)
Dept: PULMONOLOGY | Facility: CLINIC | Age: 28
End: 2018-07-20

## 2018-07-20 ENCOUNTER — APPOINTMENT (EMERGENCY)
Dept: RADIOLOGY | Facility: HOSPITAL | Age: 28
End: 2018-07-20
Payer: COMMERCIAL

## 2018-07-20 VITALS
HEART RATE: 107 BPM | RESPIRATION RATE: 16 BRPM | DIASTOLIC BLOOD PRESSURE: 70 MMHG | TEMPERATURE: 98.4 F | OXYGEN SATURATION: 98 % | SYSTOLIC BLOOD PRESSURE: 116 MMHG

## 2018-07-20 DIAGNOSIS — J18.9 PNEUMONIA: Primary | ICD-10-CM

## 2018-07-20 LAB
ANION GAP SERPL CALCULATED.3IONS-SCNC: 7 MMOL/L (ref 4–13)
BASOPHILS # BLD AUTO: 0.01 THOUSANDS/ΜL (ref 0–0.1)
BASOPHILS NFR BLD AUTO: 0 % (ref 0–1)
BUN SERPL-MCNC: 10 MG/DL (ref 5–25)
CALCIUM SERPL-MCNC: 8.7 MG/DL (ref 8.3–10.1)
CHLORIDE SERPL-SCNC: 102 MMOL/L (ref 100–108)
CO2 SERPL-SCNC: 26 MMOL/L (ref 21–32)
CREAT SERPL-MCNC: 0.89 MG/DL (ref 0.6–1.3)
EOSINOPHIL # BLD AUTO: 0.04 THOUSAND/ΜL (ref 0–0.61)
EOSINOPHIL NFR BLD AUTO: 0 % (ref 0–6)
ERYTHROCYTE [DISTWIDTH] IN BLOOD BY AUTOMATED COUNT: 14.2 % (ref 11.6–15.1)
GFR SERPL CREATININE-BSD FRML MDRD: 88 ML/MIN/1.73SQ M
GLUCOSE SERPL-MCNC: 93 MG/DL (ref 65–140)
HCT VFR BLD AUTO: 34.1 % (ref 34.8–46.1)
HGB BLD-MCNC: 11.4 G/DL (ref 11.5–15.4)
LYMPHOCYTES # BLD AUTO: 1.13 THOUSANDS/ΜL (ref 0.6–4.47)
LYMPHOCYTES NFR BLD AUTO: 11 % (ref 14–44)
MAGNESIUM SERPL-MCNC: 1.7 MG/DL (ref 1.6–2.6)
MCH RBC QN AUTO: 27.4 PG (ref 26.8–34.3)
MCHC RBC AUTO-ENTMCNC: 33.4 G/DL (ref 31.4–37.4)
MCV RBC AUTO: 82 FL (ref 82–98)
MONOCYTES # BLD AUTO: 0.67 THOUSAND/ΜL (ref 0.17–1.22)
MONOCYTES NFR BLD AUTO: 6 % (ref 4–12)
NEUTROPHILS # BLD AUTO: 8.95 THOUSANDS/ΜL (ref 1.85–7.62)
NEUTS SEG NFR BLD AUTO: 83 % (ref 43–75)
NRBC BLD AUTO-RTO: 0 /100 WBCS
PLATELET # BLD AUTO: 140 THOUSANDS/UL (ref 149–390)
PMV BLD AUTO: 9.9 FL (ref 8.9–12.7)
POTASSIUM SERPL-SCNC: 3.7 MMOL/L (ref 3.5–5.3)
RBC # BLD AUTO: 4.16 MILLION/UL (ref 3.81–5.12)
SODIUM SERPL-SCNC: 135 MMOL/L (ref 136–145)
WBC # BLD AUTO: 10.8 THOUSAND/UL (ref 4.31–10.16)

## 2018-07-20 PROCEDURE — 83735 ASSAY OF MAGNESIUM: CPT | Performed by: EMERGENCY MEDICINE

## 2018-07-20 PROCEDURE — 80048 BASIC METABOLIC PNL TOTAL CA: CPT | Performed by: EMERGENCY MEDICINE

## 2018-07-20 PROCEDURE — 71046 X-RAY EXAM CHEST 2 VIEWS: CPT

## 2018-07-20 PROCEDURE — 96360 HYDRATION IV INFUSION INIT: CPT

## 2018-07-20 PROCEDURE — 85025 COMPLETE CBC W/AUTO DIFF WBC: CPT | Performed by: EMERGENCY MEDICINE

## 2018-07-20 PROCEDURE — 94640 AIRWAY INHALATION TREATMENT: CPT

## 2018-07-20 PROCEDURE — 36415 COLL VENOUS BLD VENIPUNCTURE: CPT | Performed by: EMERGENCY MEDICINE

## 2018-07-20 PROCEDURE — 96361 HYDRATE IV INFUSION ADD-ON: CPT

## 2018-07-20 PROCEDURE — 99285 EMERGENCY DEPT VISIT HI MDM: CPT

## 2018-07-20 RX ORDER — DOXYCYCLINE HYCLATE 100 MG/1
100 CAPSULE ORAL ONCE
Status: COMPLETED | OUTPATIENT
Start: 2018-07-20 | End: 2018-07-20

## 2018-07-20 RX ORDER — DOXYCYCLINE HYCLATE 100 MG/1
100 CAPSULE ORAL 2 TIMES DAILY
Qty: 20 CAPSULE | Refills: 0 | Status: SHIPPED | OUTPATIENT
Start: 2018-07-20 | End: 2018-07-30

## 2018-07-20 RX ADMIN — ALBUTEROL SULFATE 5 MG: 2.5 SOLUTION RESPIRATORY (INHALATION) at 13:47

## 2018-07-20 RX ADMIN — SODIUM CHLORIDE 1000 ML: 0.9 INJECTION, SOLUTION INTRAVENOUS at 13:31

## 2018-07-20 RX ADMIN — DOXYCYCLINE 100 MG: 100 CAPSULE ORAL at 15:10

## 2018-07-20 RX ADMIN — IPRATROPIUM BROMIDE 0.5 MG: 0.5 SOLUTION RESPIRATORY (INHALATION) at 13:47

## 2018-07-20 NOTE — ED NOTES
Patient states chest feels much better  Has not been coughing       Junito Richard RN  07/20/18 6079

## 2018-07-20 NOTE — DISCHARGE INSTRUCTIONS
Take the doxycycline twice daily for the next 10 days  Make sure to finish the entire course  Call Dr Wing Asencio, you should be seen in the office for further evaluation and treatment of your asthma  Return to the ER if, after 48 hours of being on the antibiotics, you are having fevers greater than 100 4 or if your symptoms continue to worsen  Community Acquired Pneumonia   WHAT YOU NEED TO KNOW:   Community-acquired pneumonia (CAP) is a lung infection that you get outside of a hospital or nursing home setting  Your lungs become inflamed and cannot work well  CAP may be caused by bacteria, viruses, or fungi  DISCHARGE INSTRUCTIONS:   Return to the emergency department if:   · You are confused and cannot think clearly  · You have increased trouble breathing  · Your lips or fingernails turn gray or blue  Contact your healthcare provider if:   · Your symptoms do not get better, or they get worse  · You are urinating less, or not at all  · You have questions or concerns about your condition or care  Medicines:   · Medicines  may be given to treat a bacterial, viral, or fungal infection  You may also be given medicines to dilate your bronchial tubes to help you breathe more easily  · Take your medicine as directed  Contact your healthcare provider if you think your medicine is not helping or if you have side effects  Tell him or her if you are allergic to any medicine  Keep a list of the medicines, vitamins, and herbs you take  Include the amounts, and when and why you take them  Bring the list or the pill bottles to follow-up visits  Carry your medicine list with you in case of an emergency  Follow up with your healthcare provider within 3 days or as directed: You may need another x-ray  Write down your questions so you remember to ask them during your visits  Deep breathing and coughing:  Deep breathing helps open the air passages in your lungs   Coughing helps bring up mucus from your lungs  Take a deep breath and hold the breath as long as you can  Then push the air out of your lungs with a deep, strong cough  Spit out any mucus you have coughed up  Take 10 deep breaths in a row every hour that you are awake  Remember to follow each deep breath with a cough  Do not smoke or allow others to smoke around you:  Nicotine and other chemicals in cigarettes and cigars can cause lung damage  Ask your healthcare provider for information if you currently smoke and need help to quit  E-cigarettes or smokeless tobacco still contain nicotine  Talk to your healthcare provider before you use these products  Manage CAP at home:   · Breathe warm, moist air  This helps loosen mucus  Loosely place a warm, wet washcloth over your nose and mouth  A room humidifier may also help make the air moist     · Drink liquids as directed  Ask your healthcare provider how much liquid to drink each day and which liquids to drink  Liquids help make mucus thin and easier to get out of your body  · Gently tap your chest   This helps loosen mucus so it is easier to cough  Lie with your head lower than your chest several times a day and tap your chest      · Get plenty of rest   Rest helps your body heal   Prevent CAP:   · Wash your hands often with soap and water  Carry germ-killing hand gel with you  You can use the gel to clean your hands when soap and water are not available  Do not touch your eyes, nose, or mouth unless you have washed your hands first      · Clean surfaces often  Clean doorknobs, countertops, cell phones, and other surfaces that are touched often  · Always cover your mouth when you cough  Cough into a tissue or your shirtsleeve so you do not spread germs from your hands  · Try to avoid people who have a cold or the flu  If you are sick, stay away from others as much as possible  · Ask about vaccines  You may need a vaccine to help prevent pneumonia   Get an influenza (flu) vaccine every year as soon as it becomes available  © 2017 [employer] Information is for End User's use only and may not be sold, redistributed or otherwise used for commercial purposes  All illustrations and images included in CareNotes® are the copyrighted property of A D A M , Inc  or Jaspal Tao  The above information is an  only  It is not intended as medical advice for individual conditions or treatments  Talk to your doctor, nurse or pharmacist before following any medical regimen to see if it is safe and effective for you

## 2018-07-20 NOTE — TELEPHONE ENCOUNTER
Patient called stating she will be going to [facility] ER at about 12noon today  Not feeling well with pneumonia like symptoms

## 2018-07-20 NOTE — ED NOTES
Patient transported to [address] [place] [address] via [address] Carranza  07/20/18 9514

## 2018-07-20 NOTE — ED NOTES
Patient c/o not feeling well since [facility] has had sore throat, "not much coughing" , temp 101 last evening, feeling tired, and SOB with exertion         Sergio Figueroa RN  07/20/18 3127

## 2018-07-21 NOTE — ED PROVIDER NOTES
History  Chief Complaint   Patient presents with    Shortness of Breath     Patient reports increasing shortness of breath throughout the week  Also reporting increased fatigue, sleeping almost 16 hours a day  States, multiple people at her work have pneumonia  Reports dry cough and fever  30 YO female with Hx of exercise induced asthma present with shortness of breath and fatigue  States this has been present for the last week, constant  She has been trying her inhaler without alleviation of her symptoms  She has had a mild cough, non-productive  Pt denies fevers or chills, no sore throat  States there are sick contacts at her work  Pt has had similar with previous PNA  Pt denies CP/F/C/N/V/D/C, no dysuria, burning on urination or blood in urine  History provided by:  Patient   used: No    Shortness of Breath   Severity:  Moderate  Onset quality:  Gradual  Duration:  5 days  Timing:  Constant  Progression:  Unchanged  Chronicity:  New  Relieved by:  Nothing  Worsened by:  Nothing  Ineffective treatments:  None tried  Associated symptoms: cough    Associated symptoms: no abdominal pain, no chest pain, no diaphoresis, no fever, no headaches, no rash, no sore throat and no vomiting    Cough:     Cough characteristics:  Non-productive    Severity:  Mild    Onset quality:  Gradual    Duration:  5 days    Timing:  Sporadic    Progression:  Unchanged    Chronicity:  New      Prior to Admission Medications   Prescriptions Last Dose Informant Patient Reported? Taking?    B Complex Vitamins (VITAMIN B COMPLEX PO)   Yes Yes   Sig: Take 1 capsule by mouth daily     VENTOLIN  (90 Base) MCG/ACT inhaler 7/19/2018 at Unknown time  No Yes   Sig: Inhale 2 puffs every 6 (six) hours as needed for wheezing      Facility-Administered Medications: None       Past Medical History:   Diagnosis Date    Depressed        Past Surgical History:   Procedure Laterality Date    BREAST LUMPECTOMY Right History reviewed  No pertinent family history  I have reviewed and agree with the history as documented  Social History   Substance Use Topics    Smoking status: Never Smoker    Smokeless tobacco: Never Used    Alcohol use Yes      Comment: social        Review of Systems   Constitutional: Negative for chills, diaphoresis, fatigue and fever  HENT: Negative for dental problem and sore throat  Eyes: Negative for visual disturbance  Respiratory: Positive for cough and shortness of breath  Cardiovascular: Negative for chest pain  Gastrointestinal: Negative for abdominal pain, diarrhea and vomiting  Genitourinary: Negative for dysuria and frequency  Musculoskeletal: Negative for arthralgias  Skin: Negative for rash  Neurological: Negative for dizziness, weakness, light-headedness and headaches  Psychiatric/Behavioral: Negative for agitation, behavioral problems and confusion  All other systems reviewed and are negative  Physical Exam  Physical Exam   Constitutional: She is oriented to person, place, and time  She appears well-developed and well-nourished  HENT:   Head: Normocephalic and atraumatic  Eyes: EOM are normal    Neck: Normal range of motion  Cardiovascular: Normal rate, regular rhythm and normal heart sounds  Pulmonary/Chest: Effort normal and breath sounds normal    Abdominal: Soft  Musculoskeletal: Normal range of motion  Neurological: She is alert and oriented to person, place, and time  Skin: Skin is warm and dry  Psychiatric: She has a normal mood and affect  Her behavior is normal  Thought content normal    Nursing note and vitals reviewed        Vital Signs  ED Triage Vitals [07/20/18 1236]   Temperature Pulse Respirations Blood Pressure SpO2   98 4 °F (36 9 °C) (!) 109 20 107/57 100 %      Temp Source Heart Rate Source Patient Position - Orthostatic VS BP Location FiO2 (%)   Oral Monitor Sitting Right arm --      Pain Score       3 Vitals:    07/20/18 1302 07/20/18 1414 07/20/18 1452 07/20/18 1540   BP:  112/59 116/70    Pulse: 87 (!) 110 (!) 118 (!) 107   Patient Position - Orthostatic VS:  Sitting         Visual Acuity      ED Medications  Medications   sodium chloride 0 9 % bolus 1,000 mL (0 mL Intravenous Stopped 7/20/18 1512)   albuterol inhalation solution 5 mg (5 mg Nebulization Given 7/20/18 1347)   ipratropium (ATROVENT) 0 02 % inhalation solution 0 5 mg (0 5 mg Nebulization Given 7/20/18 1347)   doxycycline hyclate (VIBRAMYCIN) capsule 100 mg (100 mg Oral Given 7/20/18 1510)       Diagnostic Studies  Results Reviewed     Procedure Component Value Units Date/Time    Basic metabolic panel [[id number]]  (Abnormal) Collected:  07/20/18 1330    Lab Status:  Final result Specimen:  Blood from Arm, Right Updated:  07/20/18 1351     Sodium 135 (L) mmol/L      Potassium 3 7 mmol/L      Chloride 102 mmol/L      CO2 26 mmol/L      Anion Gap 7 mmol/L      BUN 10 mg/dL      Creatinine 0 89 mg/dL      Glucose 93 mg/dL      Calcium 8 7 mg/dL      eGFR 88 ml/min/1 73sq m     Narrative:         National Kidney Disease Education Program recommendations are as follows:  GFR calculation is accurate only with a steady state creatinine  Chronic Kidney disease less than 60 ml/min/1 73 sq  meters  Kidney failure less than 15 ml/min/1 73 sq  meters      Magnesium [[id number]]  (Normal) Collected:  07/20/18 1330    Lab Status:  Final result Specimen:  Blood from Arm, Right Updated:  07/20/18 1351     Magnesium 1 7 mg/dL     CBC and differential [[id number]]  (Abnormal) Collected:  07/20/18 1330    Lab Status:  Final result Specimen:  Blood from Arm, Right Updated:  07/20/18 1338     WBC 10 80 (H) Thousand/uL      RBC 4 16 Million/uL      Hemoglobin 11 4 (L) g/dL      Hematocrit 34 1 (L) %      MCV 82 fL      MCH 27 4 pg      MCHC 33 4 g/dL      RDW 14 2 %      MPV 9 9 fL      Platelets 380 (L) Thousands/uL      nRBC 0 /100 WBCs      Neutrophils Relative 83 (H) %      Lymphocytes Relative 11 (L) %      Monocytes Relative 6 %      Eosinophils Relative 0 %      Basophils Relative 0 %      Neutrophils Absolute 8 95 (H) Thousands/µL      Lymphocytes Absolute 1 13 Thousands/µL      Monocytes Absolute 0 67 Thousand/µL      Eosinophils Absolute 0 04 Thousand/µL      Basophils Absolute 0 01 Thousands/µL                  XR chest 2 views   Final Result by Jaci Moralez MD (07/20 2288)      New subtle patchy density suggested in the right upper lobe may represent early pneumonia  The study was marked in EPIC for significant notification  Workstation performed: [id number]                    Procedures  Procedures       Phone Contacts  ED Phone Contact    ED Course                               MDM  Number of Diagnoses or Management Options  Pneumonia: new and does not require workup  Diagnosis management comments: 1  Shortness of breath - Pt with no respiratory distress  Appears well, lung sounds clear  Will check CXR for possible PNA, CBC as pt has a Hx of anemia  Will try breathing Tx  Amount and/or Complexity of Data Reviewed  Clinical lab tests: ordered and reviewed  Tests in the radiology section of CPT®: ordered and reviewed  Independent visualization of images, tracings, or specimens: yes    Patient Progress  Patient progress: improved    CritCare Time    Disposition  Final diagnoses:   Pneumonia     Time reflects when diagnosis was documented in both MDM as applicable and the Disposition within this note     Time User Action Codes Description Comment    7/20/2018  3:15 PM Jcarlos CHUA Add [J18 9] Pneumonia       ED Disposition     ED Disposition Condition Comment    Discharge  Ricardo Delatorre discharge to home/self care      Condition at discharge: Stable        Follow-up Information     Follow up With Specialties Details Why Contact Amna Momin MD Critical Care Medicine, Pulmonology, Pulmonary Disease Schedule an appointment as soon as possible for a visit  [address] [facility]  [phone]            Discharge Medication List as of 7/20/2018  3:17 PM      START taking these medications    Details   doxycycline hyclate (VIBRAMYCIN) 100 mg capsule Take 1 capsule (100 mg total) by mouth 2 (two) times a day for 10 days, Starting Fri 7/20/2018, Until Mon 7/30/2018, Print         CONTINUE these medications which have NOT CHANGED    Details   B Complex Vitamins (VITAMIN B COMPLEX PO) Take 1 capsule by mouth daily  , Historical Med      VENTOLIN  (90 Base) MCG/ACT inhaler Inhale 2 puffs every 6 (six) hours as needed for wheezing, Starting Tue 5/15/2018, Normal           No discharge procedures on file      ED Provider  Electronically Signed by           Jeferson Mcgee MD  07/21/18 9406

## 2018-08-13 ENCOUNTER — ANESTHESIA EVENT (OUTPATIENT)
Dept: PERIOP | Facility: AMBULARY SURGERY CENTER | Age: 28
End: 2018-08-13
Payer: COMMERCIAL

## 2018-08-14 NOTE — PRE-PROCEDURE INSTRUCTIONS
Pre-Surgery Instructions:   Medication Instructions    B Complex Vitamins (VITAMIN B COMPLEX PO) Patient was instructed by Physician and understands   norethindrone-ethinyl estradiol-iron (ESTROSTEP FE) 1-20/1-30/1-35 MG-MCG TABS Instructed patient per Anesthesia Guidelines   VENTOLIN  (90 Base) MCG/ACT inhaler Instructed patient per Anesthesia Guidelines  Pre op and bathing instructions reviewed

## 2018-08-27 ENCOUNTER — HOSPITAL ENCOUNTER (OUTPATIENT)
Facility: AMBULARY SURGERY CENTER | Age: 28
Setting detail: OUTPATIENT SURGERY
Discharge: HOME | End: 2018-08-27
Attending: OBSTETRICS & GYNECOLOGY | Admitting: OBSTETRICS & GYNECOLOGY
Payer: COMMERCIAL

## 2018-08-27 ENCOUNTER — ANESTHESIA (OUTPATIENT)
Dept: PERIOP | Facility: AMBULARY SURGERY CENTER | Age: 28
End: 2018-08-27
Payer: COMMERCIAL

## 2018-08-27 VITALS
WEIGHT: 163.5 LBS | SYSTOLIC BLOOD PRESSURE: 120 MMHG | BODY MASS INDEX: 28.97 KG/M2 | HEIGHT: 63 IN | TEMPERATURE: 97.4 F | DIASTOLIC BLOOD PRESSURE: 64 MMHG | HEART RATE: 66 BPM | OXYGEN SATURATION: 96 % | RESPIRATION RATE: 16 BRPM

## 2018-08-27 DIAGNOSIS — Z98.890 STATUS POST OVARIAN CYSTECTOMY: Primary | ICD-10-CM

## 2018-08-27 DIAGNOSIS — N83.202 BILATERAL OVARIAN CYSTS: ICD-10-CM

## 2018-08-27 DIAGNOSIS — N94.6 DYSMENORRHEA: ICD-10-CM

## 2018-08-27 DIAGNOSIS — N83.201 BILATERAL OVARIAN CYSTS: ICD-10-CM

## 2018-08-27 DIAGNOSIS — N80.1 ENDOMETRIOSIS OF OVARY: ICD-10-CM

## 2018-08-27 DIAGNOSIS — Z87.42 STATUS POST OVARIAN CYSTECTOMY: Primary | ICD-10-CM

## 2018-08-27 LAB — EXT PREGNANCY TEST URINE: NEGATIVE

## 2018-08-27 PROCEDURE — 81025 URINE PREGNANCY TEST: CPT | Performed by: OBSTETRICS & GYNECOLOGY

## 2018-08-27 PROCEDURE — 88305 TISSUE EXAM BY PATHOLOGIST: CPT | Performed by: PATHOLOGY

## 2018-08-27 RX ORDER — ONDANSETRON 2 MG/ML
INJECTION INTRAMUSCULAR; INTRAVENOUS AS NEEDED
Status: DISCONTINUED | OUTPATIENT
Start: 2018-08-27 | End: 2018-08-27 | Stop reason: SURG

## 2018-08-27 RX ORDER — LIDOCAINE HYDROCHLORIDE 10 MG/ML
INJECTION, SOLUTION INFILTRATION; PERINEURAL AS NEEDED
Status: DISCONTINUED | OUTPATIENT
Start: 2018-08-27 | End: 2018-08-27 | Stop reason: SURG

## 2018-08-27 RX ORDER — FENTANYL CITRATE/PF 50 MCG/ML
50 SYRINGE (ML) INJECTION
Status: COMPLETED | OUTPATIENT
Start: 2018-08-27 | End: 2018-08-27

## 2018-08-27 RX ORDER — MIDAZOLAM HYDROCHLORIDE 1 MG/ML
INJECTION INTRAMUSCULAR; INTRAVENOUS AS NEEDED
Status: DISCONTINUED | OUTPATIENT
Start: 2018-08-27 | End: 2018-08-27 | Stop reason: SURG

## 2018-08-27 RX ORDER — IBUPROFEN 600 MG/1
600 TABLET ORAL EVERY 6 HOURS PRN
Status: DISCONTINUED | OUTPATIENT
Start: 2018-08-27 | End: 2018-08-27 | Stop reason: HOSPADM

## 2018-08-27 RX ORDER — GLYCOPYRROLATE 0.2 MG/ML
INJECTION INTRAMUSCULAR; INTRAVENOUS AS NEEDED
Status: DISCONTINUED | OUTPATIENT
Start: 2018-08-27 | End: 2018-08-27 | Stop reason: SURG

## 2018-08-27 RX ORDER — PROMETHAZINE HYDROCHLORIDE 25 MG/ML
25 INJECTION, SOLUTION INTRAMUSCULAR; INTRAVENOUS ONCE AS NEEDED
Status: DISCONTINUED | OUTPATIENT
Start: 2018-08-27 | End: 2018-08-27 | Stop reason: HOSPADM

## 2018-08-27 RX ORDER — ACETAMINOPHEN 325 MG/1
650 TABLET ORAL EVERY 6 HOURS PRN
Qty: 30 TABLET | Refills: 0 | Status: CANCELLED
Start: 2018-08-27

## 2018-08-27 RX ORDER — FENTANYL CITRATE 50 UG/ML
INJECTION, SOLUTION INTRAMUSCULAR; INTRAVENOUS AS NEEDED
Status: DISCONTINUED | OUTPATIENT
Start: 2018-08-27 | End: 2018-08-27 | Stop reason: SURG

## 2018-08-27 RX ORDER — ACETAMINOPHEN 325 MG/1
650 TABLET ORAL EVERY 6 HOURS PRN
Status: DISCONTINUED | OUTPATIENT
Start: 2018-08-27 | End: 2018-08-27 | Stop reason: HOSPADM

## 2018-08-27 RX ORDER — MAGNESIUM HYDROXIDE 1200 MG/15ML
LIQUID ORAL AS NEEDED
Status: DISCONTINUED | OUTPATIENT
Start: 2018-08-27 | End: 2018-08-27 | Stop reason: HOSPADM

## 2018-08-27 RX ORDER — OXYCODONE HYDROCHLORIDE AND ACETAMINOPHEN 5; 325 MG/1; MG/1
1 TABLET ORAL EVERY 4 HOURS PRN
Status: DISCONTINUED | OUTPATIENT
Start: 2018-08-27 | End: 2018-08-27 | Stop reason: HOSPADM

## 2018-08-27 RX ORDER — ROCURONIUM BROMIDE 10 MG/ML
INJECTION, SOLUTION INTRAVENOUS AS NEEDED
Status: DISCONTINUED | OUTPATIENT
Start: 2018-08-27 | End: 2018-08-27 | Stop reason: SURG

## 2018-08-27 RX ORDER — IBUPROFEN 600 MG/1
600 TABLET ORAL EVERY 6 HOURS PRN
Qty: 30 TABLET | Refills: 0 | Status: CANCELLED
Start: 2018-08-27

## 2018-08-27 RX ORDER — DIPHENHYDRAMINE HYDROCHLORIDE 50 MG/ML
12.5 INJECTION INTRAMUSCULAR; INTRAVENOUS ONCE AS NEEDED
Status: DISCONTINUED | OUTPATIENT
Start: 2018-08-27 | End: 2018-08-27 | Stop reason: HOSPADM

## 2018-08-27 RX ORDER — PROPOFOL 10 MG/ML
INJECTION, EMULSION INTRAVENOUS AS NEEDED
Status: DISCONTINUED | OUTPATIENT
Start: 2018-08-27 | End: 2018-08-27 | Stop reason: SURG

## 2018-08-27 RX ORDER — MEPERIDINE HYDROCHLORIDE 25 MG/ML
12.5 INJECTION INTRAMUSCULAR; INTRAVENOUS; SUBCUTANEOUS ONCE AS NEEDED
Status: DISCONTINUED | OUTPATIENT
Start: 2018-08-27 | End: 2018-08-27 | Stop reason: HOSPADM

## 2018-08-27 RX ORDER — OXYCODONE HYDROCHLORIDE AND ACETAMINOPHEN 5; 325 MG/1; MG/1
2 TABLET ORAL EVERY 4 HOURS PRN
Status: DISCONTINUED | OUTPATIENT
Start: 2018-08-27 | End: 2018-08-27 | Stop reason: HOSPADM

## 2018-08-27 RX ORDER — OXYCODONE HYDROCHLORIDE AND ACETAMINOPHEN 5; 325 MG/1; MG/1
1-2 TABLET ORAL EVERY 4 HOURS PRN
Qty: 15 TABLET | Refills: 0 | Status: SHIPPED | OUTPATIENT
Start: 2018-08-27 | End: 2018-09-06

## 2018-08-27 RX ORDER — METOCLOPRAMIDE HYDROCHLORIDE 5 MG/ML
10 INJECTION INTRAMUSCULAR; INTRAVENOUS ONCE AS NEEDED
Status: DISCONTINUED | OUTPATIENT
Start: 2018-08-27 | End: 2018-08-27 | Stop reason: HOSPADM

## 2018-08-27 RX ORDER — SODIUM CHLORIDE 9 MG/ML
125 INJECTION, SOLUTION INTRAVENOUS CONTINUOUS
Status: DISCONTINUED | OUTPATIENT
Start: 2018-08-27 | End: 2018-08-27 | Stop reason: HOSPADM

## 2018-08-27 RX ORDER — BUPIVACAINE HYDROCHLORIDE 2.5 MG/ML
INJECTION, SOLUTION INFILTRATION; PERINEURAL AS NEEDED
Status: DISCONTINUED | OUTPATIENT
Start: 2018-08-27 | End: 2018-08-27 | Stop reason: HOSPADM

## 2018-08-27 RX ADMIN — FENTANYL CITRATE 50 MCG: 50 INJECTION, SOLUTION INTRAMUSCULAR; INTRAVENOUS at 16:29

## 2018-08-27 RX ADMIN — OXYCODONE AND ACETAMINOPHEN 2 TABLET: 5; 325 TABLET ORAL at 17:39

## 2018-08-27 RX ADMIN — FENTANYL CITRATE 25 MCG: 50 INJECTION, SOLUTION INTRAMUSCULAR; INTRAVENOUS at 15:40

## 2018-08-27 RX ADMIN — FENTANYL CITRATE 25 MCG: 50 INJECTION, SOLUTION INTRAMUSCULAR; INTRAVENOUS at 16:02

## 2018-08-27 RX ADMIN — FENTANYL CITRATE 50 MCG: 50 INJECTION, SOLUTION INTRAMUSCULAR; INTRAVENOUS at 16:37

## 2018-08-27 RX ADMIN — FENTANYL CITRATE 50 MCG: 50 INJECTION, SOLUTION INTRAMUSCULAR; INTRAVENOUS at 14:41

## 2018-08-27 RX ADMIN — PROPOFOL 200 MG: 10 INJECTION, EMULSION INTRAVENOUS at 14:08

## 2018-08-27 RX ADMIN — DEXAMETHASONE SODIUM PHOSPHATE 10 MG: 10 INJECTION INTRAMUSCULAR; INTRAVENOUS at 14:14

## 2018-08-27 RX ADMIN — NEOSTIGMINE METHYLSULFATE 3 MG: 1 INJECTION, SOLUTION INTRAMUSCULAR; INTRAVENOUS; SUBCUTANEOUS at 16:09

## 2018-08-27 RX ADMIN — FENTANYL CITRATE 50 MCG: 50 INJECTION, SOLUTION INTRAMUSCULAR; INTRAVENOUS at 15:27

## 2018-08-27 RX ADMIN — FENTANYL CITRATE 50 MCG: 50 INJECTION, SOLUTION INTRAMUSCULAR; INTRAVENOUS at 16:54

## 2018-08-27 RX ADMIN — ONDANSETRON 4 MG: 2 INJECTION INTRAMUSCULAR; INTRAVENOUS at 16:09

## 2018-08-27 RX ADMIN — FENTANYL CITRATE 50 MCG: 50 INJECTION, SOLUTION INTRAMUSCULAR; INTRAVENOUS at 14:08

## 2018-08-27 RX ADMIN — ROCURONIUM BROMIDE 10 MG: 10 INJECTION INTRAVENOUS at 14:58

## 2018-08-27 RX ADMIN — LIDOCAINE HYDROCHLORIDE 50 MG: 10 INJECTION, SOLUTION INFILTRATION; PERINEURAL at 14:08

## 2018-08-27 RX ADMIN — GLYCOPYRROLATE 0.6 MG: 0.2 INJECTION, SOLUTION INTRAMUSCULAR; INTRAVENOUS at 16:09

## 2018-08-27 RX ADMIN — ROCURONIUM BROMIDE 40 MG: 10 INJECTION INTRAVENOUS at 14:08

## 2018-08-27 RX ADMIN — SODIUM CHLORIDE: 0.9 INJECTION, SOLUTION INTRAVENOUS at 13:11

## 2018-08-27 RX ADMIN — MIDAZOLAM HYDROCHLORIDE 2 MG: 1 INJECTION, SOLUTION INTRAMUSCULAR; INTRAVENOUS at 14:01

## 2018-08-27 RX ADMIN — FENTANYL CITRATE 50 MCG: 50 INJECTION, SOLUTION INTRAMUSCULAR; INTRAVENOUS at 16:44

## 2018-08-27 NOTE — ANESTHESIA POSTPROCEDURE EVALUATION
Post-Op Assessment Note      CV Status:  Stable    Mental Status:  Alert and awake    Hydration Status:  Euvolemic    PONV Controlled:  Controlled    Airway Patency:  Patent    Post Op Vitals Reviewed: Yes          Staff: CRNA           /55 (08/27/18 1626)    Temp 98 °F (36 7 °C) (08/27/18 1626)    Pulse 100 (08/27/18 1626)   Resp 20 (08/27/18 1626)    SpO2 98 % (08/27/18 1626)

## 2018-08-27 NOTE — OP NOTE
OPERATIVE REPORT  PATIENT NAME: Lisset Cope    :  1990  MRN: [id number]  Pt Location: [facility] OR ROOM 06    SURGERY DATE: 2018    Surgeon(s) and Role:     Jose Manuel Campa, DO - Primary     * Kathy Willard Fellow- Assist    Preop Diagnosis:  Bilateral ovarian cysts [N83 201, N83 202]  Endometriosis of ovary [N80 1]  Dysmenorrhea [N94 6]    Post-Op Diagnosis Codes:     * Bilateral ovarian cysts [N83 201, N83 202]     * Endometriosis of ovary [N80 1]     * Dysmenorrhea [N94 6]    Procedure(s) (LRB):  LAPAROSCOPY; BILATERAL OVARIAN CYSTECTOMY;  LYSIS OF ADHESIONS;   HYSTEROSCOPY; ENDOMETRIAL BIOPSY , POLYPECTOMY    Specimen(s):  ID Type Source Tests Collected by Time Destination   1 : endometrial currettings Tissue Endometrium TISSUE EXAM [facility],  2018 1425    2 : LEFT OVARIAN CYST WALL Tissue Ovary, Left TISSUE EXAM [facility], DO 2018 1520    3 : right ovarian cyst wall Tissue Ovary, Right TISSUE EXAM [facility], DO 2018 1551        Estimated Blood Loss:   Minimal    Drains:None    Urine: 50 ml  IVF: 1500 ml  Hysteroscopic fluid deficit: 470 ml       Anesthesia Type:   General    Operative Indications:  Bilateral ovarian cysts [N83 201, N83 202]  Endometriosis of ovary [N80 1]  Dysmenorrhea [N94 6]      Operative Findings: The uterus sounded to 10 centimeters anteverted  Through the hysteroscope both ostia were visualized there were multiple polyps throughout the cavity  Otherwise the uterine cavity was normal     Through the laparoscope the liver edge stomach were normal  The anterior cul-de-sac was normal   Both ovaries were enlarged due to endometriomas bilaterally  Both fallopian tubes were normal in the fimbriated ends were identified  Both ovaries were adhered to the posterior uterine corpus through filmy adhesions  There was no other implants of endometriosis or adhesions throughout the pelvis      Complications:   None    Procedure and Technique:  The patient was identified in the preoperative holding area and taken to the operative suite where she was placed in supine position  She then underwent general endotracheal anesthesia and was placed in the dorsal lithotomy position  She was prepped and draped in the normal sterile fashion and time-out was held according to protocol and was deemed we could begin the procedure  The Brown catheter was inserted and left in place for the duration of the procedure  The cervix was visualized using a Alegre retractor posteriorly and a [place] retractor anteriorly  The anterior lip the cervix was grasped with a single-tooth tenaculum  The uterus sounded to 10 centimeters anteverted using an endometrial biopsy [place]  Endometrial biopsy was performed and tissue sent to pathology for further evaluation  The cervix was then progressively dilated to a 21 [place] using Jaspal Aislinn dilators  The hysteroscope was then inserted and a careful survey of the cavity revealed the findings as noted above  Again both ostia were visualized and polyps were noted throughout the cavity  Using the MyoSure hysteroscope device the polyps were removed and tissue sent to pathology for further evaluation  The hysteroscope was then removed and a Zumi intrauterine manipulator was then inserted and left in place for the duration of the procedure  Gloves were then changed and attention was turned to the patient's abdomen  The umbilicus was everted and a 5 millimeter skin incision was then made and the abdomen was tented up and a Veress needle was inserted and the abdomen was insufflated to a pressure of 15 millimeters of mercury  A 5 millimeter trocar and port were then inserted under direct visualization and there was no injury to bowel or blood vessels at the entry point  The patient was placed in Trendelenburg  Attention was turned to the patient's right lateral side, a 5 millimeter skin incision was then made away from any epigastric blood vessels  A 5 millimeter trocar and port were then inserted under direct visualization and there was no injury to bowel or blood vessels at the entry point  Attention was turned to the patient's left lateral side  A 5 millimeter skin incision was then made through which a 5 millimeter trocar and port then passed and there was no injury to bowel or blood vessels at the entry point  A careful survey of the abdomen and pelvis revealed the findings as noted above  Attention was 1st turned to the posterior cul-de-sac  There were adhesions from both ovaries to the uterus and these adhesions were bluntly and sharply lysed using the laparoscopic scissors  The left ovary was able to be fully dissected away from the uterine corpus  An incision was then made over the left ovary in an avascular portion of the ovary using the laparoscopic scissors and cautery was used  The incision was extended and chocolate fluid like cyst was noted  This cyst fluid was suction irrigated  The cyst wall was then gently teased away from the ovary  Through blunt and sharp dissection the cyst wall was carefully dissected away from the ovary  The base of the cyst wall was thick in nature and therefore had to be excised using the laparoscopic PK  The left ovarian cyst wall was then put in the anterior cul-de-sac and left there until the end of the case  The base of the left ovary was then coagulated using the laparoscopic spatula  Attention was then turned to the right ovary   The right ovary was carefully dissected away from the posterior uterine corpus using blunt and sharp dissection  An incision was then made in an avascular portion of the ovary using the laparoscopic scissors on cautery  The incision was then extended and chocolate like fluid cyst was seen this was suction irrigated  The right ovarian cyst wall was then carefully teased away from the right ovary using blunt and sharp dissection traction and counter traction    It was dissected all the way down to the base this was also removed using the laparoscopic PK  The right ovarian cyst wall was then placed in the anterior cul-de-sac and left there until the end of the case  The base of the ovary was then coagulated using the laparoscopic spatula  The cyst walls were attempted to be removed using the 5 millimeter port however unfortunately the cyst walls were too large to remove through the 5 millimeter port  The right lower quadrant incision was then extended to a 10 millimeter incision and the 5 millimeter port was removed and the 10 millimeter trocar and port were then inserted under direct visualization and there was no injury to bowel or blood vessels at the entry point  The laparoscopic Endo-Catch bag was then inserted and both of the cyst walls were then placed in the bag and removed in total and sent to pathology for further evaluation  The pelvis was then copiously suction irrigated and hemostasis was observed  CO2 was then allowed to escape from the abdomen and a low-pressure setting hemostasis was observed  Using the laparoscopic Endo close device the fascia of the right lower quadrant incision was closed with 0 Vicryl  CO2 was then allowed to fully escape from the abdomen and all instruments were removed under direct visualization and there was no bowel escaping into the incisions  At the end of the case anesthesia was instructed to give the patient 3 Valsalva breaths to minimize postoperative gas pain  All skin incisions were closed with 4 O Vicryl and Histacryl placed over top  All instruments were then removed from the patient's vagina including the Brown  Hemostasis was observed  The patient was then returned to supine position awakened from anesthesia and taken to the recovery room were she was noted to be in stable condition  It should be noted at the end of the procedure all sponge lap needle instrument counts were correct x2 per the RN       I was present for the entire procedure    Patient Disposition:  PACU     SIGNATURE: Disha Cochran DO  DATE: August 27, 2018  TIME: 4:37 PM

## 2018-08-27 NOTE — DISCHARGE INSTRUCTIONS
Laparoscopic Excision of Ovarian Cysts   WHAT YOU NEED TO KNOW:   Laparoscopic excision is surgery to remove a cyst on your ovary  DISCHARGE INSTRUCTIONS:   Medicines:   · Antibiotics: This medicine is given to fight or prevent an infection caused by bacteria  Always take your antibiotics exactly as ordered by your healthcare provider  Do not stop taking your medicine unless directed by your healthcare provider  Never save antibiotics or take leftover antibiotics that were given to you for another illness  · Pain medicine: You may be given a prescription medicine to decrease pain  Do not wait until the pain is severe before you take this medicine  · Take your medicine as directed  Contact your healthcare provider if you think your medicine is not helping or if you have side effects  Tell him or her if you are allergic to any medicine  Keep a list of the medicines, vitamins, and herbs you take  Include the amounts, and when and why you take them  Bring the list or the pill bottles to follow-up visits  Carry your medicine list with you in case of an emergency  Follow up with your healthcare provider as directed:  Write down your questions so you remember to ask them during your visits  Contact your healthcare provider if:   · You have stomach cramps  · You have a fever  · You have questions or concerns about your condition or care  Seek care immediately or call 911 if:   · You have bleeding that does not stop  · You have severe abdominal pain that does not go away, even with medicine  · You feel lightheaded and short of breath  · You have chest pain when you take a deep breath or cough, or you cough up blood  · Your arm or leg feels warm, tender, and painful  It may look swollen and red  © 2017 [employer] Information is for End User's use only and may not be sold, redistributed or otherwise used for commercial purposes   All illustrations and images included in CareNotes® are the copyrighted property of [employer]  or [employer]aspal Tao  The above information is an  only  It is not intended as medical advice for individual conditions or treatments  Talk to your doctor, nurse or pharmacist before following any medical regimen to see if it is safe and effective for you

## 2018-08-27 NOTE — ANESTHESIA PREPROCEDURE EVALUATION
Review of Systems/Medical History  Patient summary reviewed  Chart reviewed      Cardiovascular  Exercise tolerance (METS): >4,     Pulmonary  Pneumonia, Asthma , well controlled/ stable Last rescue: < 1 month ago ,        GI/Hepatic    No GERD ,        Negative  ROS        Endo/Other  Negative endo/other ROS      GYN       Hematology  Anemia ,     Musculoskeletal  Negative musculoskeletal ROS        Neurology  Negative neurology ROS      Psychology   Negative psychology ROS              Physical Exam    Airway    Mallampati score: II  TM Distance: >3 FB  Neck ROM: full     Dental   No notable dental hx     Cardiovascular  Cardiovascular exam normal    Pulmonary  Pulmonary exam normal     Other Findings        Anesthesia Plan  ASA Score- 2     Anesthesia Type- general with ASA Monitors  Additional Monitors:   Airway Plan: ETT  Plan Factors-  Patient did not smoke on day of surgery  Induction- intravenous  Postoperative Plan-     Informed Consent- Anesthetic plan and risks discussed with patient  I personally reviewed this patient with the CRNA  Discussed and agreed on the Anesthesia Plan with the CRNA  Keon Sinha

## 2018-10-15 ENCOUNTER — HOSPITAL ENCOUNTER (EMERGENCY)
Facility: HOSPITAL | Age: 28
Discharge: HOME | End: 2018-10-15
Attending: EMERGENCY MEDICINE | Admitting: EMERGENCY MEDICINE
Payer: COMMERCIAL

## 2018-10-15 ENCOUNTER — APPOINTMENT (EMERGENCY)
Dept: RADIOLOGY | Facility: HOSPITAL | Age: 28
End: 2018-10-15
Payer: COMMERCIAL

## 2018-10-15 VITALS
RESPIRATION RATE: 18 BRPM | OXYGEN SATURATION: 97 % | DIASTOLIC BLOOD PRESSURE: 55 MMHG | HEART RATE: 90 BPM | TEMPERATURE: 98.2 F | SYSTOLIC BLOOD PRESSURE: 106 MMHG

## 2018-10-15 DIAGNOSIS — R05.9 COUGH: ICD-10-CM

## 2018-10-15 DIAGNOSIS — J02.9 PHARYNGITIS: ICD-10-CM

## 2018-10-15 DIAGNOSIS — J06.9 URI (UPPER RESPIRATORY INFECTION): Primary | ICD-10-CM

## 2018-10-15 LAB
BACTERIA UR QL AUTO: ABNORMAL /HPF
BILIRUB UR QL STRIP: NEGATIVE
CLARITY UR: CLEAR
COLOR UR: YELLOW
EXT PREG TEST URINE: NEGATIVE
GLUCOSE UR STRIP-MCNC: NEGATIVE MG/DL
HGB UR QL STRIP.AUTO: ABNORMAL
KETONES UR STRIP-MCNC: NEGATIVE MG/DL
LEUKOCYTE ESTERASE UR QL STRIP: ABNORMAL
NITRITE UR QL STRIP: NEGATIVE
NON-SQ EPI CELLS URNS QL MICRO: ABNORMAL /HPF
PH UR STRIP.AUTO: 6 [PH] (ref 4.5–8)
PROT UR STRIP-MCNC: NEGATIVE MG/DL
RBC #/AREA URNS AUTO: ABNORMAL /HPF
SP GR UR STRIP.AUTO: 1.01 (ref 1–1.03)
UROBILINOGEN UR QL STRIP.AUTO: 0.2 E.U./DL
WBC #/AREA URNS AUTO: ABNORMAL /HPF

## 2018-10-15 PROCEDURE — 99283 EMERGENCY DEPT VISIT LOW MDM: CPT

## 2018-10-15 PROCEDURE — 81001 URINALYSIS AUTO W/SCOPE: CPT

## 2018-10-15 PROCEDURE — 71046 X-RAY EXAM CHEST 2 VIEWS: CPT

## 2018-10-15 PROCEDURE — 81025 URINE PREGNANCY TEST: CPT | Performed by: EMERGENCY MEDICINE

## 2018-10-15 RX ORDER — AMOXICILLIN 250 MG/1
500 CAPSULE ORAL ONCE
Status: COMPLETED | OUTPATIENT
Start: 2018-10-15 | End: 2018-10-15

## 2018-10-15 RX ORDER — AMOXICILLIN 500 MG/1
500 CAPSULE ORAL EVERY 8 HOURS SCHEDULED
Qty: 30 CAPSULE | Refills: 0 | Status: SHIPPED | OUTPATIENT
Start: 2018-10-15 | End: 2018-10-25

## 2018-10-15 RX ADMIN — AMOXICILLIN 500 MG: 250 CAPSULE ORAL at 04:04

## 2018-10-15 NOTE — DISCHARGE INSTRUCTIONS
Acute Cough   AMBULATORY CARE:   An acute cough  can last up to 3 weeks  Common causes of an acute cough include a cold, allergies, or a lung infection  Seek care immediately if:   · You have trouble breathing or feel short of breath  · You cough up blood, or you see blood in your mucus  · You faint or feel weak or dizzy  · You have chest pain when you cough or take a deep breath  · You have new wheezing  Contact your healthcare provider if:   · You have a fever  · Your cough lasts longer than 4 weeks  · Your symptoms do not improve with treatment  · You have questions or concerns about your condition or care  Treatment:  An acute cough usually goes away on its own  Ask your healthcare provider about medicines you can take to decrease your cough  You may need medicine to stop the cough, decrease swelling in your airways, or help open your airways  Medicine may also be given to help you cough up mucus  If you have an infection caused by bacteria, you may need antibiotics  Manage your symptoms:   · Do not smoke and stay away from others who smoke  Nicotine and other chemicals in cigarettes and cigars can cause lung damage and make your cough worse  Ask your healthcare provider for information if you currently smoke and need help to quit  E-cigarettes or smokeless tobacco still contain nicotine  Talk to your healthcare provider before you use these products  · Drink extra liquids as directed  Liquids will help thin and loosen mucus so you can cough it up  Liquids will also help prevent dehydration  Examples of good liquids to drink include water, fruit juice, and broth  Do not drink liquids that contain caffeine  Caffeine can increase your risk for dehydration  Ask your healthcare provider how much liquid to drink each day  · Rest as directed  Do not do activities that make your cough worse, such as exercise  · Use a humidifier or vaporizer    Use a cool mist humidifier or a vaporizer to increase air moisture in your home  This may make it easier for you to breathe and help decrease your cough  · Eat 2 to 5 mL of honey 2 times each day  Honey can help thin mucus and decrease your cough  · Use cough drops or lozenges  These can help decrease throat irritation and your cough  Follow up with your healthcare provider as directed:  Write down your questions so you remember to ask them during your visits  © 2017 [address] [employer] Information is for End User's use only and may not be sold, redistributed or otherwise used for commercial purposes  All illustrations and images included in CareNotes® are the copyrighted property of [employer]  or [employer]asp[employer] Aislinn  The above information is an  only  It is not intended as medical advice for individual conditions or treatments  Talk to your doctor, nurse or pharmacist before following any medical regimen to see if it is safe and effective for you  Chronic Cough   WHAT YOU NEED TO KNOW:   A chronic cough is a cough that lasts more than 4 weeks in children or 8 weeks in adults  DISCHARGE INSTRUCTIONS:   Call 911 for any of the following:   · You cough up blood  · You faint when you cough  · You have trouble breathing  Contact your healthcare provider if:   · You have new or worsening symptoms  · You have severe pain when you take a deep breath  · You become very tired after a coughing fit  · You have trouble sleeping because of the coughing  · You have questions or concerns about your condition or care  Medicines:   · Medicines  may be needed to stop your cough  You may also need medicine to treat allergies or acid reflux, or decrease swelling in your airways  If you have a respiratory infection, you may need antibiotics  Medicine may be given as a pill or to use in an inhaler  · Take your medicine as directed    Contact your healthcare provider if you think your medicine is not helping or if you have side effects  Tell him or her if you are allergic to any medicine  Keep a list of the medicines, vitamins, and herbs you take  Include the amounts, and when and why you take them  Bring the list or the pill bottles to follow-up visits  Carry your medicine list with you in case of an emergency  Self-care:   · Prevent acid reflex  Acid reflux can make your chronic cough worse  Raise your head and upper back when you sleep  Place 2 or more pillows behind your head or sleep in a recliner  Do not lie down for at least 1 hour after you eat  Do not have foods or drinks that increase heartburn  Ask your healthcare provider for other ways to prevent acid reflux  · Do not smoke  Encourage your adolescent child not to smoke  Nicotine and other chemicals in cigarettes and cigars can cause lung damage  They can also make your cough worse  Ask your healthcare provider for information if you currently smoke and need help to quit  E-cigarettes or smokeless tobacco still contain nicotine  Talk to your healthcare provider before you use these products  · Stay away from secondhand smoke  Do not let people smoke in your car, home, or near your child  Do not stand near someone that is smoking  This includes anyone that is smoking an E-cigarrete  · Avoid anything that triggers your allergies or irritates your throat  Allergens and irritants can make your chronic cough worse  Allergens may include dust mites, pollen, pet dander, or mold  Wear a mask if you work around pollutants or irritants  Ask your healthcare provider for more ways to decrease your exposure to allergens or irritants  · Drink plenty of liquids as directed  Liquids may help relieve throat discomfort that causes you to cough  Add honey to tea or hot water to help ease your throat pain  Ask how much liquid to drink each day and which liquids are best for you  Follow up with your healthcare provider as directed:   You may need to return for more tests  Your healthcare provider may refer to you other specialists  Write down your questions so you remember to ask them during your visits  © 2017 [address] [employer] Information is for End User's use only and may not be sold, redistributed or otherwise used for commercial purposes  All illustrations and images included in CareNotes® are the copyrighted property of [employer]  or [employer]aspal Tao  The above information is an  only  It is not intended as medical advice for individual conditions or treatments  Talk to your doctor, nurse or pharmacist before following any medical regimen to see if it is safe and effective for you  Pharyngitis   WHAT YOU NEED TO KNOW:   Pharyngitis, or sore throat, is inflammation of the tissues and structures in your pharynx (throat)  Pharyngitis is most often caused by bacteria  It may also be caused by a cold or flu virus  Other causes include smoking, allergies, or acid reflux  DISCHARGE INSTRUCTIONS:   Call 911 for any of the following:   · You have trouble breathing or swallowing because your throat is swollen or sore  Return to the emergency department if:   · You are drooling because it hurts too much to swallow  · Your fever is higher than 102? F (39?C) or lasts longer than 3 days  · You are confused  · You taste blood in your throat  Contact your healthcare provider if:   · Your throat pain gets worse  · You have a painful lump in your throat that does not go away after 5 days  · Your symptoms do not improve after 5 days  · You have questions or concerns about your condition or care  Medicines:  Viral pharyngitis will go away on its own without treatment  Your sore throat should start to feel better in 3 to 5 days for both viral and bacterial infections  You may need any of the following:  · Antibiotics  treat a bacterial infection      · NSAIDs , such as ibuprofen, help decrease swelling, pain, and fever  NSAIDs can cause stomach bleeding or kidney problems in certain people  If you take blood thinner medicine, always ask your healthcare provider if NSAIDs are safe for you  Always read the medicine label and follow directions  · Acetaminophen  decreases pain and fever  It is available without a doctor's order  Ask how much to take and how often to take it  Follow directions  Acetaminophen can cause liver damage if not taken correctly  · Take your medicine as directed  Contact your healthcare provider if you think your medicine is not helping or if you have side effects  Tell him or her if you are allergic to any medicine  Keep a list of the medicines, vitamins, and herbs you take  Include the amounts, and when and why you take them  Bring the list or the pill bottles to follow-up visits  Carry your medicine list with you in case of an emergency  Manage your symptoms:   · Gargle salt water  Mix ¼ teaspoon salt in an 8 ounce glass of warm water and gargle  This may help decrease swelling in your throat  · Drink liquids as directed  You may need to drink more liquids than usual  Liquids may help soothe your throat and prevent dehydration  Ask how much liquid to drink each day and which liquids are best for you  · Use a cool-steam humidifier  to help moisten the air in your room and calm your cough  · Soothe your throat  with cough drops, ice, soft foods, or popsicles  Prevent the spread of pharyngitis:  Cover your mouth and nose when you cough or sneeze  Do not share food or drinks  Wash your hands often  Use soap and water  If soap and water are unavailable, use an alcohol based hand   Follow up with your healthcare provider as directed:  Write down your questions so you remember to ask them during your visits  © 2017 [employer] Information is for End User's use only and may not be sold, redistributed or otherwise used for commercial purposes   All illustrations and images included in CareNotes® are the copyrighted property of [employer]  or [employer]aspal Tao  The above information is an  only  It is not intended as medical advice for individual conditions or treatments  Talk to your doctor, nurse or pharmacist before following any medical regimen to see if it is safe and effective for you  Upper Respiratory Infection   WHAT YOU NEED TO KNOW:   An upper respiratory infection is also called the common cold  It is an infection that can affect your nose, throat, ears, and sinuses  For healthy people, the common cold is usually not serious and does not need special treatment  Cold symptoms are usually worst for the first 3 to 5 days  Most people get better in 7 to 14 days  You may continue to cough for 2 to 3 weeks  Colds are caused by viruses and do not get better with antibiotics  DISCHARGE INSTRUCTIONS:   Seek care immediately if:   · You have chest pain or trouble breathing  Contact your healthcare provider if:   · You have a fever over 102ºF (39°C)  · Your sore throat gets worse or you see white or yellow spots in your throat  · Your symptoms get worse after 3 to 5 days or your cold is not better in 14 days  · You have a rash anywhere on your skin  · You have large, tender lumps in your neck  · You have thick, green, or yellow drainage from your nose  · You cough up thick yellow, green, or bloody mucus  · You are vomiting for more than 24 hours and cannot keep fluids down  · You have a bad earache  · You have questions or concerns about your condition or care  Medicines: You may need any of the following:  · Decongestants  help reduce nasal congestion and help you breathe more easily  If you take decongestant pills, they may make you feel restless or cause problems with your sleep  Do not use decongestant sprays for more than a few days  · Cough suppressants  help reduce coughing   Ask your healthcare provider which type of cough medicine is best for you  · NSAIDs , such as ibuprofen, help decrease swelling, pain, and fever  NSAIDs can cause stomach bleeding or kidney problems in certain people  If you take blood thinner medicine, always ask your healthcare provider if NSAIDs are safe for you  Always read the medicine label and follow directions  · Acetaminophen  decreases pain and fever  It is available without a doctor's order  Ask how much to take and how often to take it  Follow directions  Read the labels of all other medicines you are using to see if they also contain acetaminophen, or ask your doctor or pharmacist  Acetaminophen can cause liver damage if not taken correctly  Do not use more than 4 grams (4,000 milligrams) total of acetaminophen in one day  · Take your medicine as directed  Contact your healthcare provider if you think your medicine is not helping or if you have side effects  Tell him or her if you are allergic to any medicine  Keep a list of the medicines, vitamins, and herbs you take  Include the amounts, and when and why you take them  Bring the list or the pill bottles to follow-up visits  Carry your medicine list with you in case of an emergency  Follow up with your healthcare provider as directed:  Write down your questions so you remember to ask them during your visits  Self-care:   · Rest as much as possible  Slowly start to do more each day  · Drink more liquids as directed  Liquids will help thin and loosen mucus so you can cough it up  Liquids will also help prevent dehydration  Liquids that help prevent dehydration include water, fruit juice, and broth  Do not drink liquids that contain caffeine  Caffeine can increase your risk for dehydration  Ask your healthcare provider how much liquid to drink each day  · Soothe a sore throat  Gargle with warm salt water  This helps your sore throat feel better   Make salt water by dissolving ¼ teaspoon salt in 1 cup warm water  You may also suck on hard candy or throat lozenges  You may use a sore throat spray  · Use a humidifier or vaporizer  Use a cool mist humidifier or a vaporizer to increase air moisture in your home  This may make it easier for you to breathe and help decrease your cough  · Use saline nasal drops as directed  These help relieve congestion  · Apply petroleum-based jelly around the outside of your nostrils  This can decrease irritation from blowing your nose  · Do not smoke  Nicotine and other chemicals in cigarettes and cigars can make your symptoms worse  They can also cause infections such as bronchitis or pneumonia  Ask your healthcare provider for information if you currently smoke and need help to quit  E-cigarettes or smokeless tobacco still contain nicotine  Talk to your healthcare provider before you use these products  Prevent spreading your cold to others:   · Try to stay away from other people during the first 2 to 3 days of your cold when it is more easily spread  · Do not share food or drinks  · Do not share hand towels with household members  · Wash your hands often, especially after you blow your nose  Turn away from other people and cover your mouth and nose with a tissue when you sneeze or cough  © 2017 [employer] Information is for End User's use only and may not be sold, redistributed or otherwise used for commercial purposes  All illustrations and images included in CareNotes® are the copyrighted property of [employer]  or [employer]aspal Tao  The above information is an  only  It is not intended as medical advice for individual conditions or treatments  Talk to your doctor, nurse or pharmacist before following any medical regimen to see if it is safe and effective for you  Viral Syndrome   WHAT YOU NEED TO KNOW:   Viral syndrome is a term used for a viral infection that has no clear cause  Viruses are spread easily from person to person through the air and on shared items  DISCHARGE INSTRUCTIONS:   Call 911 for the following:   · You have a seizure  · You cannot be woken  · You have chest pain or trouble breathing  Seek care immediately if:   · You have a stiff neck, a bad headache, and sensitivity to light  · You feel weak, dizzy, or confused  · You stop urinating or urinate a lot less than normal      · You cough up blood or thick, yellow or green, mucus  · You have severe abdominal pain or your abdomen is larger than usual   Contact your healthcare provider if:   · Your symptoms do not get better with treatment, or get worse, after 3 days  · You have a rash or ear pain  · You have burning when you urinate  · You have questions or concerns about your condition or care  Medicines: You may  need any of the following:  · Acetaminophen  decreases pain and fever  It is available without a doctor's order  Ask how much medicine to take and how often to take it  Follow directions  Acetaminophen can cause liver damage if not taken correctly  · NSAIDs , such as ibuprofen, help decrease swelling, pain, and fever  NSAIDs can cause stomach bleeding or kidney problems in certain people  If you take blood thinner medicine, always ask your healthcare provider if NSAIDs are safe for you  Always read the medicine label and follow directions  · Cold medicine  helps decrease swelling, control a cough, and relieve chest or nasal congestion  · Saline nasal spray  helps decrease nasal congestion  · Take your medicine as directed  Contact your healthcare provider if you think your medicine is not helping or if you have side effects  Tell him of her if you are allergic to any medicine  Keep a list of the medicines, vitamins, and herbs you take  Include the amounts, and when and why you take them  Bring the list or the pill bottles to follow-up visits   Carry your medicine list with you in case of an emergency  Manage your symptoms:   · Drink liquids as directed  to prevent dehydration  Ask how much liquid to drink each day and which liquids are best for you  Ask if you should drink an oral rehydration solution (ORS)  An ORS has the right amounts of water, salts, and sugar you need to replace body fluids  This may help prevent dehydration caused by vomiting or diarrhea  Do not drink liquids with caffeine  Drinks with caffeine can make dehydration worse  · Get plenty of rest  to help your body heal  Take naps throughout the day  Ask your healthcare provider when you can return to work and your normal activities  · Use a cool mist humidifier  to help you breathe easier if you have nasal or chest congestion  Ask your healthcare provider how to use a cool mist humidifier  · Eat honey or use cough drops  to help decrease throat discomfort  Ask your healthcare provider how much honey you should eat each day  Cough drops are available without a doctor's order  Follow directions for taking cough drops  · Do not smoke and stay away from others who smoke  Nicotine and other chemicals in cigarettes and cigars can cause lung damage  Smoking can also delay healing  Ask your healthcare provider for information if you currently smoke and need help to quit  E-cigarettes or smokeless tobacco still contain nicotine  Talk to your healthcare provider before you use these products  · Wash your hands frequently  to prevent the spread of germs to others  Use soap and water  Use gel hand  when soap and water are not available  Wash your hands after you use the bathroom, cough, or sneeze  Wash your hands before you prepare or eat food  Follow up with your healthcare provider as directed:  Write down your questions so you remember to ask them during your visits    © 2017 [employer] Information is for End User's use only and may not be sold, redistributed or otherwise used for commercial purposes  All illustrations and images included in CareNotes® are the copyrighted property of [employer]  or [employer]aspal Tao  The above information is an  only  It is not intended as medical advice for individual conditions or treatments  Talk to your doctor, nurse or pharmacist before following any medical regimen to see if it is safe and effective for you

## 2018-10-15 NOTE — ED PROVIDER NOTES
History  Chief Complaint   Patient presents with    Fever - 9 weeks to 74 years     pt report fever all weekend, was seen at doctor at friday and tested neg for flu  pt reports chest tightness and difficulty breathing  Patient is a 42-year-old female that presents for persistent fevers for the past 4 days, flu-like symptoms, chest tightness and difficulty breathing  She has a history of asthma and recurrent pneumonias  Most recent pneumonia was in July of this year  She saw her family doctor 2 days ago and had a flu swab done  She was called yesterday with a negative result  Patient started having trouble breathing, chest tightness and persistent fevers up to 102 5 tonight and she called her PCP  She has an appointment with him tomorrow morning at 9:00 a m  But he stated given her persistent symptoms to go into the hospital because of her history of pneumonia  She also has a history of asthma  She does not have a rescue inhaler  She states that she only uses her long-acting preventative medications  History provided by:  Patient   used: No    Fever - 9 weeks to 74 years   Max temp prior to arrival:  102 5  Severity:  Moderate  Onset quality:  Gradual  Duration:  4 days  Timing:  Constant  Progression:  Unchanged  Chronicity:  Recurrent  Relieved by:  Acetaminophen  Associated symptoms: chills, cough and sore throat    Associated symptoms: no diarrhea, no nausea, no rash and no vomiting    Risk factors: recent sickness    Risk factors: no hx of cancer, no immunosuppression and no recent travel        Prior to Admission Medications   Prescriptions Last Dose Informant Patient Reported? Taking?    B Complex Vitamins (VITAMIN B COMPLEX PO)   Yes No   Sig: Take 1 capsule by mouth daily     VENTOLIN  (90 Base) MCG/ACT inhaler   No Yes   Sig: Inhale 2 puffs every 6 (six) hours as needed for wheezing   Patient taking differently: Inhale 2 puffs as needed for wheezing norethindrone-ethinyl estradiol-iron (ESTROSTEP FE) 1-20/1-30/1-35 MG-MCG TABS   Yes No   Sig: Take by mouth daily      Facility-Administered Medications: None       Past Medical History:   Diagnosis Date    Anemia     Asthma     Depressed     Pneumonia March 2018,July 2018    hospitalized in March for same       Past Surgical History:   Procedure Laterality Date    BREAST LUMPECTOMY Right     NM HYSTEROSCOPY,W/ENDO BX N/A 8/27/2018    Procedure: HYSTEROSCOPY; ENDOMETRIAL BIOPSY;  Surgeon: Adelina Siddiqi DO;  Location: [facility];  Service: Gynecology    NM LAP,RMV  ADNEXAL STRUCTURE N/A 8/27/2018    Procedure: LAPAROSCOPY; BILATERAL OVARIAN CYSTECTOMY; FULGERATION OF ENDOMETRIOSIS; POSSIBLE LYSIS OF ADHESIONS; POSSIBLE BILATERAL SALPINGECTOMY;  Surgeon: Adelina Siddiqi DO;  Location: [facility];  Service: Gynecology       History reviewed  No pertinent family history  I have reviewed and agree with the history as documented  Social History   Substance Use Topics    Smoking status: Never Smoker    Smokeless tobacco: Never Used    Alcohol use Yes      Comment: social        Review of Systems   Constitutional: Positive for chills, fatigue and fever  HENT: Positive for sore throat  Respiratory: Positive for cough, chest tightness and shortness of breath  Gastrointestinal: Negative for abdominal pain, diarrhea, nausea and vomiting  Skin: Negative for color change, pallor, rash and wound  Allergic/Immunologic: Negative for immunocompromised state  All other systems reviewed and are negative  Physical Exam  Physical Exam   Constitutional: She is oriented to person, place, and time  She appears well-developed and well-nourished  No distress  HENT:   Head: Normocephalic and atraumatic  Mouth/Throat: Uvula is midline and mucous membranes are normal  Mucous membranes are not pale, not dry and not cyanotic  No trismus in the jaw  No uvula swelling   Oropharyngeal exudate and posterior oropharyngeal erythema present  Tonsils are 2+ on the right  Tonsils are 2+ on the left  Tonsillar exudate  Eyes: Pupils are equal, round, and reactive to light  Conjunctivae and EOM are normal  Right eye exhibits no discharge  Left eye exhibits no discharge  No scleral icterus  Neck: Normal range of motion  Neck supple  Cardiovascular: Normal rate, regular rhythm, normal heart sounds and intact distal pulses  Exam reveals no friction rub  No murmur heard  Pulmonary/Chest: Effort normal and breath sounds normal  No stridor  No respiratory distress  She has no wheezes  She has no rales  Abdominal: Soft  She exhibits no distension  There is no tenderness  There is no rebound and no guarding  Musculoskeletal: Normal range of motion  She exhibits no edema, tenderness or deformity  Lymphadenopathy:        Head (right side): No submandibular adenopathy present  Head (left side): No submandibular adenopathy present  Right cervical: No superficial cervical, no deep cervical and no posterior cervical adenopathy present  Left cervical: No superficial cervical, no deep cervical and no posterior cervical adenopathy present  Neurological: She is alert and oriented to person, place, and time  Skin: Skin is warm and dry  She is not diaphoretic  Psychiatric: She has a normal mood and affect  Nursing note and vitals reviewed        Vital Signs  ED Triage Vitals   Temperature Pulse Respirations Blood Pressure SpO2   10/15/18 0204 10/15/18 0203 10/15/18 0203 10/15/18 0203 10/15/18 0203   98 3 °F (36 8 °C) (!) 111 16 125/70 97 %      Temp Source Heart Rate Source Patient Position - Orthostatic VS BP Location FiO2 (%)   10/15/18 0204 10/15/18 0203 10/15/18 0203 10/15/18 0203 --   Oral Monitor Lying Right arm       Pain Score       --                  Vitals:    10/15/18 0203 10/15/18 0403   BP: 125/70 106/55   Pulse: (!) 111 90   Patient Position - Orthostatic VS: Lying        Visual Acuity      ED Medications  Medications   amoxicillin (AMOXIL) capsule 500 mg (500 mg Oral Given 10/15/18 0404)       Diagnostic Studies  Results Reviewed     Procedure Component Value Units Date/Time    Urine Microscopic [[id number]]  (Abnormal) Collected:  10/15/18 0314    Lab Status:  Final result Specimen:  Urine from Urine, Clean Catch Updated:  10/15/18 0325     RBC, UA 0-1 (A) /hpf      WBC, UA 2-4 (A) /hpf      Epithelial Cells Occasional /hpf      Bacteria, UA Occasional /hpf     POCT pregnancy, urine [[id number]]  (Normal) Resulted:  10/15/18 0307    Lab Status:  Final result Updated:  10/15/18 0307     EXT PREG TEST UR (Ref: Negative) Negative    POCT urinalysis dipstick [[id number]]  (Abnormal) Resulted:  10/15/18 0307    Lab Status:  Final result Updated:  10/15/18 0307    ED Urine Macroscopic [[id number]]  (Abnormal) Collected:  10/15/18 0314    Lab Status:  Final result Specimen:  Urine Updated:  10/15/18 0304     Color, UA Yellow     Clarity, UA Clear     pH, UA 6 0     Leukocytes, UA Trace (A)     Nitrite, UA Negative     Protein, UA Negative mg/dl      Glucose, UA Negative mg/dl      Ketones, UA Negative mg/dl      Urobilinogen, UA 0 2 E U /dl      Bilirubin, UA Negative     Blood, UA Small (A)     Specific [place], UA 1 010    Narrative:       CLINITEK RESULT                 XR chest 2 views   ED Interpretation by Hayley Gonzalez DO (10/15 0402)   NAD  RUL infiltrate appears resolved  Procedures  Procedures       Phone Contacts  ED Phone Contact    ED Course                               MDM  Number of Diagnoses or Management Options  Cough: new and requires workup  Pharyngitis: new and requires workup  URI (upper respiratory infection): new and requires workup  Diagnosis management comments: Patient appears well on exam   Chest x-ray to me appears improved from July when they read a right upper lobe pneumonia    Patient's vital signs do show a sinus tachycardia but her temperature is normal   She took Tylenol a few hours prior to arrival   Her heart rate is normal on my exam   Repeat vital signs show her heart rate of 90  Respiratory rate is normal   Temperature is normal on repeat exam   Patient is concerned of her recurrent pneumonias  She is following up with a family doctor and had an appointment with pulmonology  They did not add any other medications to her regime that she is on now  She is going to discuss with her family doctor about following up with an immunologist   The only abnormality on exam today is pharyngeal erythema, tonsillar swelling with mild tonsillar exudate  Explained that this could all be viral   She has a low centour criteria for strep throat  I discussed with the patient and her mother at length about antibiotics and steroids  Patient has an appointment in a few hours with her PCP  I stated to her that my advice would be to wait on medications and to see what her PCP has to say in a few hours  Patient is worried that something else could be going on  She is requesting amoxicillin  She states that she is not allergic to this that she has taken it in the past   She wants to hold off on steroids at this point since she was allergic to prednisone  I explained we could try Decadron but she had hives to prednisone so I will not pursue this at this time  She agrees with this plan of care and will follow up with her PCP in the next 4 hours as scheduled         Amount and/or Complexity of Data Reviewed  Tests in the radiology section of CPT®: reviewed  Tests in the medicine section of CPT®: ordered and reviewed  Review and summarize past medical records: yes  Independent visualization of images, tracings, or specimens: yes    Patient Progress  Patient progress: stable    CritCare Time    Disposition  Final diagnoses:   URI (upper respiratory infection)   Cough   Pharyngitis     Time reflects when diagnosis was documented in both MDM as applicable and the Disposition within this note     Time User Action Codes Description Comment    10/15/2018  4:00 AM Sarah Diaz Add [J06 9] URI (upper respiratory infection)     10/15/2018  4:00 AM Sarah Diaz Add [R05] Cough     10/15/2018  4:00 AM Sarah Diaz Add [J02 9] Pharyngitis       ED Disposition     ED Disposition Condition Comment    Discharge  Elina Innocent discharge to home/self care  Condition at discharge: Good        Follow-up Information     Follow up With Specialties Details Why Vin Leoruben 1452, DO Family Medicine Today As Scheduled [address]  [address]  [phone]            Discharge Medication List as of 10/15/2018  4:02 AM      START taking these medications    Details   amoxicillin (AMOXIL) 500 mg capsule Take 1 capsule (500 mg total) by mouth every 8 (eight) hours for 10 days, Starting Mon 10/15/2018, Until Thu 10/25/2018, Print         CONTINUE these medications which have NOT CHANGED    Details   VENTOLIN  (90 Base) MCG/ACT inhaler Inhale 2 puffs every 6 (six) hours as needed for wheezing, Starting Tue 5/15/2018, Normal      B Complex Vitamins (VITAMIN B COMPLEX PO) Take 1 capsule by mouth daily  , Historical Med      norethindrone-ethinyl estradiol-iron (ESTROSTEP FE) 1-20/1-30/1-35 MG-MCG TABS Take by mouth daily, Historical Med           No discharge procedures on file      ED Provider  Electronically Signed by           Keisha Willard DO  10/15/18 4178

## 2019-12-24 NOTE — ASSESSMENT & PLAN NOTE
Hemoglobin low on presentation  MCV low  Iron panel reviewed  Continue ferrous sulfate once daily  Will transfuse if Hb less than 7 0  Medication request came through fax, I did not see this medication listed on Pts current medication list      Please review request for accuracy   Pt is requesting a 3 month supply

## 2020-11-24 ENCOUNTER — TRANSCRIBE ORDERS (OUTPATIENT)
Dept: ADMINISTRATIVE | Facility: HOSPITAL | Age: 30
End: 2020-11-24

## 2020-11-24 DIAGNOSIS — N64.4 MASTODYNIA: Primary | ICD-10-CM

## 2021-10-22 ENCOUNTER — ANESTHESIA EVENT (OUTPATIENT)
Dept: PERIOP | Facility: HOSPITAL | Age: 31
End: 2021-10-22
Payer: COMMERCIAL

## 2021-10-22 RX ORDER — ELAGOLIX 150 MG/1
TABLET, FILM COATED ORAL DAILY
COMMUNITY

## 2021-10-22 RX ORDER — METHYLPHENIDATE HYDROCHLORIDE 20 MG/1
20 TABLET ORAL DAILY
COMMUNITY

## 2021-10-28 ENCOUNTER — ANESTHESIA (OUTPATIENT)
Dept: PERIOP | Facility: HOSPITAL | Age: 31
End: 2021-10-28
Payer: COMMERCIAL

## 2021-10-28 ENCOUNTER — HOSPITAL ENCOUNTER (OUTPATIENT)
Facility: HOSPITAL | Age: 31
Setting detail: OUTPATIENT SURGERY
Discharge: HOME | End: 2021-10-28
Attending: OBSTETRICS & GYNECOLOGY | Admitting: OBSTETRICS & GYNECOLOGY
Payer: COMMERCIAL

## 2021-10-28 VITALS
TEMPERATURE: 97.8 F | OXYGEN SATURATION: 98 % | WEIGHT: 185 LBS | BODY MASS INDEX: 32.78 KG/M2 | RESPIRATION RATE: 14 BRPM | HEIGHT: 63 IN | SYSTOLIC BLOOD PRESSURE: 124 MMHG | DIASTOLIC BLOOD PRESSURE: 63 MMHG | HEART RATE: 108 BPM

## 2021-10-28 DIAGNOSIS — G89.18 POST-OPERATIVE PAIN: Primary | ICD-10-CM

## 2021-10-28 DIAGNOSIS — N80.3 ENDOMETRIOSIS OF PELVIC PERITONEUM: ICD-10-CM

## 2021-10-28 DIAGNOSIS — N80.1 ENDOMETRIOSIS OF OVARY: ICD-10-CM

## 2021-10-28 DIAGNOSIS — R10.2 PELVIC AND PERINEAL PAIN: ICD-10-CM

## 2021-10-28 DIAGNOSIS — N94.6 DYSMENORRHEA, UNSPECIFIED: ICD-10-CM

## 2021-10-28 LAB
EXT PREGNANCY TEST URINE: NEGATIVE
EXT. CONTROL: NORMAL

## 2021-10-28 PROCEDURE — 88305 TISSUE EXAM BY PATHOLOGIST: CPT | Performed by: PATHOLOGY

## 2021-10-28 PROCEDURE — 81025 URINE PREGNANCY TEST: CPT | Performed by: OBSTETRICS & GYNECOLOGY

## 2021-10-28 PROCEDURE — 88307 TISSUE EXAM BY PATHOLOGIST: CPT | Performed by: PATHOLOGY

## 2021-10-28 RX ORDER — FENTANYL CITRATE 50 UG/ML
INJECTION, SOLUTION INTRAMUSCULAR; INTRAVENOUS AS NEEDED
Status: DISCONTINUED | OUTPATIENT
Start: 2021-10-28 | End: 2021-10-28

## 2021-10-28 RX ORDER — LIDOCAINE HYDROCHLORIDE 20 MG/ML
INJECTION, SOLUTION EPIDURAL; INFILTRATION; INTRACAUDAL; PERINEURAL AS NEEDED
Status: DISCONTINUED | OUTPATIENT
Start: 2021-10-28 | End: 2021-10-28

## 2021-10-28 RX ORDER — DIPHENHYDRAMINE HYDROCHLORIDE 50 MG/ML
INJECTION INTRAMUSCULAR; INTRAVENOUS AS NEEDED
Status: DISCONTINUED | OUTPATIENT
Start: 2021-10-28 | End: 2021-10-28

## 2021-10-28 RX ORDER — OXYCODONE HYDROCHLORIDE AND ACETAMINOPHEN 5; 325 MG/1; MG/1
1 TABLET ORAL EVERY 4 HOURS PRN
Qty: 10 TABLET | Refills: 0 | Status: SHIPPED | OUTPATIENT
Start: 2021-10-28 | End: 2021-10-28 | Stop reason: SDUPTHER

## 2021-10-28 RX ORDER — LIDOCAINE HYDROCHLORIDE 10 MG/ML
INJECTION, SOLUTION EPIDURAL; INFILTRATION; INTRACAUDAL; PERINEURAL AS NEEDED
Status: DISCONTINUED | OUTPATIENT
Start: 2021-10-28 | End: 2021-10-28

## 2021-10-28 RX ORDER — ONDANSETRON 2 MG/ML
4 INJECTION INTRAMUSCULAR; INTRAVENOUS ONCE AS NEEDED
Status: DISCONTINUED | OUTPATIENT
Start: 2021-10-28 | End: 2021-10-28 | Stop reason: HOSPADM

## 2021-10-28 RX ORDER — OXYCODONE HYDROCHLORIDE AND ACETAMINOPHEN 5; 325 MG/1; MG/1
1 TABLET ORAL EVERY 4 HOURS PRN
Qty: 10 TABLET | Refills: 0 | Status: SHIPPED | OUTPATIENT
Start: 2021-10-28 | End: 2021-11-07

## 2021-10-28 RX ORDER — OXYCODONE HYDROCHLORIDE 5 MG/1
5 TABLET ORAL EVERY 4 HOURS PRN
Status: DISCONTINUED | OUTPATIENT
Start: 2021-10-28 | End: 2021-10-28 | Stop reason: HOSPADM

## 2021-10-28 RX ORDER — MULTIVIT-MIN/IRON FUM/FOLIC AC 7.5 MG-4
1 TABLET ORAL DAILY
COMMUNITY

## 2021-10-28 RX ORDER — MIDAZOLAM HYDROCHLORIDE 2 MG/2ML
INJECTION, SOLUTION INTRAMUSCULAR; INTRAVENOUS AS NEEDED
Status: DISCONTINUED | OUTPATIENT
Start: 2021-10-28 | End: 2021-10-28

## 2021-10-28 RX ORDER — SODIUM CHLORIDE, SODIUM LACTATE, POTASSIUM CHLORIDE, CALCIUM CHLORIDE 600; 310; 30; 20 MG/100ML; MG/100ML; MG/100ML; MG/100ML
125 INJECTION, SOLUTION INTRAVENOUS CONTINUOUS
Status: DISCONTINUED | OUTPATIENT
Start: 2021-10-28 | End: 2021-10-28

## 2021-10-28 RX ORDER — KETOROLAC TROMETHAMINE 30 MG/ML
INJECTION, SOLUTION INTRAMUSCULAR; INTRAVENOUS AS NEEDED
Status: DISCONTINUED | OUTPATIENT
Start: 2021-10-28 | End: 2021-10-28

## 2021-10-28 RX ORDER — ROCURONIUM BROMIDE 10 MG/ML
INJECTION, SOLUTION INTRAVENOUS AS NEEDED
Status: DISCONTINUED | OUTPATIENT
Start: 2021-10-28 | End: 2021-10-28

## 2021-10-28 RX ORDER — IBUPROFEN 600 MG/1
600 TABLET ORAL EVERY 6 HOURS PRN
Qty: 30 TABLET | Refills: 0 | Status: SHIPPED | OUTPATIENT
Start: 2021-10-28

## 2021-10-28 RX ORDER — DEXAMETHASONE SODIUM PHOSPHATE 10 MG/ML
INJECTION, SOLUTION INTRAMUSCULAR; INTRAVENOUS AS NEEDED
Status: DISCONTINUED | OUTPATIENT
Start: 2021-10-28 | End: 2021-10-28

## 2021-10-28 RX ORDER — MEPERIDINE HYDROCHLORIDE 25 MG/ML
12.5 INJECTION INTRAMUSCULAR; INTRAVENOUS; SUBCUTANEOUS
Status: DISCONTINUED | OUTPATIENT
Start: 2021-10-28 | End: 2021-10-28 | Stop reason: HOSPADM

## 2021-10-28 RX ORDER — FENTANYL CITRATE/PF 50 MCG/ML
25 SYRINGE (ML) INJECTION
Status: DISCONTINUED | OUTPATIENT
Start: 2021-10-28 | End: 2021-10-28 | Stop reason: HOSPADM

## 2021-10-28 RX ORDER — IBUPROFEN 600 MG/1
600 TABLET ORAL EVERY 6 HOURS PRN
Status: DISCONTINUED | OUTPATIENT
Start: 2021-10-28 | End: 2021-10-28 | Stop reason: HOSPADM

## 2021-10-28 RX ORDER — NEOSTIGMINE METHYLSULFATE 1 MG/ML
INJECTION INTRAVENOUS AS NEEDED
Status: DISCONTINUED | OUTPATIENT
Start: 2021-10-28 | End: 2021-10-28

## 2021-10-28 RX ORDER — PROPOFOL 10 MG/ML
INJECTION, EMULSION INTRAVENOUS AS NEEDED
Status: DISCONTINUED | OUTPATIENT
Start: 2021-10-28 | End: 2021-10-28

## 2021-10-28 RX ORDER — ALBUTEROL SULFATE 90 UG/1
2 AEROSOL, METERED RESPIRATORY (INHALATION) EVERY 6 HOURS PRN
Status: DISCONTINUED | OUTPATIENT
Start: 2021-10-28 | End: 2021-10-28 | Stop reason: HOSPADM

## 2021-10-28 RX ORDER — BUPIVACAINE HYDROCHLORIDE 2.5 MG/ML
INJECTION, SOLUTION EPIDURAL; INFILTRATION; INTRACAUDAL AS NEEDED
Status: DISCONTINUED | OUTPATIENT
Start: 2021-10-28 | End: 2021-10-28 | Stop reason: HOSPADM

## 2021-10-28 RX ORDER — ACETAMINOPHEN 325 MG/1
650 TABLET ORAL EVERY 6 HOURS PRN
Status: DISCONTINUED | OUTPATIENT
Start: 2021-10-28 | End: 2021-10-28 | Stop reason: HOSPADM

## 2021-10-28 RX ORDER — HYDROMORPHONE HCL/PF 1 MG/ML
0.5 SYRINGE (ML) INJECTION
Status: DISCONTINUED | OUTPATIENT
Start: 2021-10-28 | End: 2021-10-28 | Stop reason: HOSPADM

## 2021-10-28 RX ORDER — GLYCOPYRROLATE 0.2 MG/ML
INJECTION INTRAMUSCULAR; INTRAVENOUS AS NEEDED
Status: DISCONTINUED | OUTPATIENT
Start: 2021-10-28 | End: 2021-10-28

## 2021-10-28 RX ORDER — ONDANSETRON 2 MG/ML
4 INJECTION INTRAMUSCULAR; INTRAVENOUS EVERY 6 HOURS PRN
Status: DISCONTINUED | OUTPATIENT
Start: 2021-10-28 | End: 2021-10-28 | Stop reason: HOSPADM

## 2021-10-28 RX ORDER — ONDANSETRON 2 MG/ML
INJECTION INTRAMUSCULAR; INTRAVENOUS AS NEEDED
Status: DISCONTINUED | OUTPATIENT
Start: 2021-10-28 | End: 2021-10-28

## 2021-10-28 RX ORDER — IBUPROFEN 600 MG/1
600 TABLET ORAL EVERY 6 HOURS PRN
Qty: 30 TABLET | Refills: 0 | Status: SHIPPED | OUTPATIENT
Start: 2021-10-28 | End: 2021-10-28 | Stop reason: SDUPTHER

## 2021-10-28 RX ADMIN — DIPHENHYDRAMINE HYDROCHLORIDE 12.5 MG: 50 INJECTION, SOLUTION INTRAMUSCULAR; INTRAVENOUS at 12:30

## 2021-10-28 RX ADMIN — MIDAZOLAM 2 MG: 1 INJECTION INTRAMUSCULAR; INTRAVENOUS at 11:43

## 2021-10-28 RX ADMIN — DEXAMETHASONE SODIUM PHOSPHATE 4 MG: 10 INJECTION INTRAMUSCULAR; INTRAVENOUS at 12:10

## 2021-10-28 RX ADMIN — OXYCODONE HYDROCHLORIDE 5 MG: 5 TABLET ORAL at 15:53

## 2021-10-28 RX ADMIN — PROPOFOL 200 MG: 10 INJECTION, EMULSION INTRAVENOUS at 11:56

## 2021-10-28 RX ADMIN — FENTANYL CITRATE 25 MCG: 50 INJECTION INTRAMUSCULAR; INTRAVENOUS at 14:15

## 2021-10-28 RX ADMIN — ONDANSETRON 4 MG: 2 INJECTION INTRAMUSCULAR; INTRAVENOUS at 12:52

## 2021-10-28 RX ADMIN — SODIUM CHLORIDE, SODIUM LACTATE, POTASSIUM CHLORIDE, AND CALCIUM CHLORIDE: .6; .31; .03; .02 INJECTION, SOLUTION INTRAVENOUS at 11:36

## 2021-10-28 RX ADMIN — NEOSTIGMINE METHYLSULFATE 3 MG: 1 INJECTION INTRAVENOUS at 12:52

## 2021-10-28 RX ADMIN — GLYCOPYRROLATE 0.4 MG: 0.2 INJECTION, SOLUTION INTRAMUSCULAR; INTRAVENOUS at 12:52

## 2021-10-28 RX ADMIN — KETOROLAC TROMETHAMINE 30 MG: 30 INJECTION, SOLUTION INTRAMUSCULAR at 12:53

## 2021-10-28 RX ADMIN — ROCURONIUM BROMIDE 50 MG: 50 INJECTION, SOLUTION INTRAVENOUS at 11:56

## 2021-10-28 RX ADMIN — ONDANSETRON 4 MG: 2 INJECTION INTRAMUSCULAR; INTRAVENOUS at 11:45

## 2021-10-28 RX ADMIN — SODIUM CHLORIDE, SODIUM LACTATE, POTASSIUM CHLORIDE, AND CALCIUM CHLORIDE: .6; .31; .03; .02 INJECTION, SOLUTION INTRAVENOUS at 12:13

## 2021-10-28 RX ADMIN — FENTANYL CITRATE 100 MCG: 50 INJECTION INTRAMUSCULAR; INTRAVENOUS at 11:54

## 2021-10-28 RX ADMIN — FENTANYL CITRATE 50 MCG: 50 INJECTION INTRAMUSCULAR; INTRAVENOUS at 12:19

## 2021-10-28 RX ADMIN — LIDOCAINE HYDROCHLORIDE 100 MG: 10 INJECTION, SOLUTION EPIDURAL; INFILTRATION; INTRACAUDAL; PERINEURAL at 11:56

## 2022-10-27 NOTE — PLAN OF CARE
Problem: DISCHARGE PLANNING - CARE MANAGEMENT  Goal: Discharge to post-acute care or home with appropriate resources  INTERVENTIONS:  - Conduct assessment to determine patient/family and health care team treatment goals, and need for post-acute services based on payer coverage, community resources, and patient preferences, and barriers to discharge  - Address psychosocial, clinical, and financial barriers to discharge as identified in assessment in conjunction with the patient/family and health care team  - Arrange appropriate level of post-acute services according to patients   needs and preference and payer coverage in collaboration with the physician and health care team  - Communicate with and update the patient/family, physician, and health care team regarding progress on the discharge plan  - Arrange appropriate transportation to post-acute venues  Outcome: Progressing  Will follow for any discharge needs  Alert and oriented to person, place and time

## 2023-07-21 RX ORDER — LABETALOL 300 MG/1
300 TABLET, FILM COATED ORAL 2 TIMES DAILY
COMMUNITY

## 2023-07-21 RX ORDER — PNV NO.95/FERROUS FUM/FOLIC AC 28MG-0.8MG
2 TABLET ORAL DAILY
COMMUNITY

## 2023-07-21 RX ORDER — LEVOTHYROXINE SODIUM 0.05 MG/1
50 TABLET ORAL DAILY
COMMUNITY

## 2023-07-21 NOTE — PRE-PROCEDURE INSTRUCTIONS
Pre-Surgery Instructions:   Medication Instructions   • B Complex Vitamins (VITAMIN B COMPLEX PO) Stop taking 7 days prior to surgery. • Ferrous Sulfate 134 MG TABS Hold day of surgery. • labetalol (NORMODYNE) 300 mg tablet Take day of surgery. • leuprolide (LUPRON DEPOT) 3.75 mg injection monthly injections   • levothyroxine 50 mcg tablet Take day of surgery. • metFORMIN (GLUCOPHAGE) 500 mg tablet Hold day of surgery. • Multiple Vitamins-Minerals (multivitamin with minerals) tablet Stop taking 7 days prior to surgery. • Prenatal Vit-Fe Fumarate-FA (Prenatal/Iron) 28-0.8 MG TABS Hold day of surgery. • VENTOLIN  (90 Base) MCG/ACT inhaler Uses PRN- OK to take day of surgery   See above    . Medication instructions for day surgery reviewed. Please use only a sip of water to take your instructed medications. Avoid all over the counter vitamins, supplements and NSAIDS for one week prior to surgery per anesthesia guidelines. Tylenol is ok to take as needed. You will receive a call one business day prior to surgery with an arrival time and hospital directions. If your surgery is scheduled on a Monday, the hospital will be calling you on the Friday prior to your surgery. If you have not heard from anyone by 8pm, please call the hospital supervisor through the hospital  at [phone]. Charlene Bank [phone]). Do not eat or drink anything after midnight the night before your surgery, including candy, mints, lifesavers, or chewing gum. Do not drink alcohol 24hrs before your surgery. Try not to smoke at least 24hrs before your surgery. Follow the pre surgery showering instructions as listed in the [facility] Surgical Experience Booklet” or otherwise provided by your surgeon's office. Do not shave the surgical area 24 hours before surgery. Do not apply any lotions, creams, including makeup, cologne, deodorant, or perfumes after showering on the day of your surgery.      No contact lenses, eye make-up, or artificial eyelashes. Remove nail polish, including gel polish, and any artificial, gel, or acrylic nails if possible. Remove all jewelry including rings and body piercing jewelry. Wear causal clothing that is easy to take on and off. Consider your type of surgery. Keep any valuables, jewelry, piercings at home. Please bring any specially ordered equipment (sling, braces) if indicated. Arrange for a responsible person to drive you to and from the hospital on the day of your surgery. Visitor Guidelines discussed. Call the surgeon's office with any new illnesses, exposures, or additional questions prior to surgery. Please reference your [facility] Surgical Experience Booklet” for additional information to prepare for your upcoming surgery.

## 2023-07-25 ENCOUNTER — ANESTHESIA EVENT (OUTPATIENT)
Dept: PERIOP | Facility: HOSPITAL | Age: 33
End: 2023-07-25
Payer: COMMERCIAL

## 2023-07-27 ENCOUNTER — HOSPITAL ENCOUNTER (OUTPATIENT)
Facility: HOSPITAL | Age: 33
Setting detail: OUTPATIENT SURGERY
Discharge: HOME | End: 2023-07-27
Attending: OBSTETRICS & GYNECOLOGY | Admitting: OBSTETRICS & GYNECOLOGY
Payer: COMMERCIAL

## 2023-07-27 ENCOUNTER — ANESTHESIA (OUTPATIENT)
Dept: PERIOP | Facility: HOSPITAL | Age: 33
End: 2023-07-27
Payer: COMMERCIAL

## 2023-07-27 VITALS
WEIGHT: 199.3 LBS | BODY MASS INDEX: 35.31 KG/M2 | DIASTOLIC BLOOD PRESSURE: 65 MMHG | HEART RATE: 75 BPM | OXYGEN SATURATION: 95 % | TEMPERATURE: 98 F | HEIGHT: 63 IN | SYSTOLIC BLOOD PRESSURE: 135 MMHG | RESPIRATION RATE: 16 BRPM

## 2023-07-27 PROBLEM — Z98.890 HISTORY OF HYSTEROSCOPY: Status: ACTIVE | Noted: 2023-07-27

## 2023-07-27 PROBLEM — Z92.84 HISTORY OF UNINTENDED AWARENESS UNDER GENERAL ANESTHESIA: Status: ACTIVE | Noted: 2023-07-27

## 2023-07-27 LAB
EXT PREGNANCY TEST URINE: NEGATIVE
EXT. CONTROL: NORMAL

## 2023-07-27 PROCEDURE — 81025 URINE PREGNANCY TEST: CPT | Performed by: ANESTHESIOLOGY

## 2023-07-27 RX ORDER — SODIUM CHLORIDE, SODIUM LACTATE, POTASSIUM CHLORIDE, CALCIUM CHLORIDE 600; 310; 30; 20 MG/100ML; MG/100ML; MG/100ML; MG/100ML
125 INJECTION, SOLUTION INTRAVENOUS CONTINUOUS
Status: DISCONTINUED | OUTPATIENT
Start: 2023-07-27 | End: 2023-07-27 | Stop reason: HOSPADM

## 2023-07-27 RX ORDER — KETOROLAC TROMETHAMINE 30 MG/ML
INJECTION, SOLUTION INTRAMUSCULAR; INTRAVENOUS AS NEEDED
Status: DISCONTINUED | OUTPATIENT
Start: 2023-07-27 | End: 2023-07-27

## 2023-07-27 RX ORDER — IBUPROFEN 600 MG/1
600 TABLET ORAL EVERY 6 HOURS PRN
Status: CANCELLED | OUTPATIENT
Start: 2023-07-27

## 2023-07-27 RX ORDER — MIDAZOLAM HYDROCHLORIDE 2 MG/2ML
INJECTION, SOLUTION INTRAMUSCULAR; INTRAVENOUS AS NEEDED
Status: DISCONTINUED | OUTPATIENT
Start: 2023-07-27 | End: 2023-07-27

## 2023-07-27 RX ORDER — ONDANSETRON 2 MG/ML
INJECTION INTRAMUSCULAR; INTRAVENOUS AS NEEDED
Status: DISCONTINUED | OUTPATIENT
Start: 2023-07-27 | End: 2023-07-27

## 2023-07-27 RX ORDER — FENTANYL CITRATE 50 UG/ML
INJECTION, SOLUTION INTRAMUSCULAR; INTRAVENOUS AS NEEDED
Status: DISCONTINUED | OUTPATIENT
Start: 2023-07-27 | End: 2023-07-27

## 2023-07-27 RX ORDER — PROPOFOL 10 MG/ML
INJECTION, EMULSION INTRAVENOUS AS NEEDED
Status: DISCONTINUED | OUTPATIENT
Start: 2023-07-27 | End: 2023-07-27

## 2023-07-27 RX ORDER — ONDANSETRON 2 MG/ML
4 INJECTION INTRAMUSCULAR; INTRAVENOUS ONCE AS NEEDED
Status: DISCONTINUED | OUTPATIENT
Start: 2023-07-27 | End: 2023-07-27 | Stop reason: HOSPADM

## 2023-07-27 RX ORDER — SODIUM CHLORIDE 9 MG/ML
INJECTION, SOLUTION INTRAVENOUS AS NEEDED
Status: DISCONTINUED | OUTPATIENT
Start: 2023-07-27 | End: 2023-07-27 | Stop reason: HOSPADM

## 2023-07-27 RX ORDER — FENTANYL CITRATE/PF 50 MCG/ML
25 SYRINGE (ML) INJECTION
Status: DISCONTINUED | OUTPATIENT
Start: 2023-07-27 | End: 2023-07-27 | Stop reason: HOSPADM

## 2023-07-27 RX ORDER — LIDOCAINE HCL/PF 100 MG/5ML
SYRINGE (ML) INJECTION AS NEEDED
Status: DISCONTINUED | OUTPATIENT
Start: 2023-07-27 | End: 2023-07-27

## 2023-07-27 RX ORDER — DEXAMETHASONE SODIUM PHOSPHATE 10 MG/ML
INJECTION, SOLUTION INTRAMUSCULAR; INTRAVENOUS AS NEEDED
Status: DISCONTINUED | OUTPATIENT
Start: 2023-07-27 | End: 2023-07-27

## 2023-07-27 RX ORDER — MEPERIDINE HYDROCHLORIDE 25 MG/ML
12.5 INJECTION INTRAMUSCULAR; INTRAVENOUS; SUBCUTANEOUS
Status: DISCONTINUED | OUTPATIENT
Start: 2023-07-27 | End: 2023-07-27 | Stop reason: HOSPADM

## 2023-07-27 RX ORDER — ACETAMINOPHEN 325 MG/1
975 TABLET ORAL EVERY 6 HOURS PRN
Status: CANCELLED | OUTPATIENT
Start: 2023-07-27

## 2023-07-27 RX ADMIN — SODIUM CHLORIDE, SODIUM LACTATE, POTASSIUM CHLORIDE, AND CALCIUM CHLORIDE: .6; .31; .03; .02 INJECTION, SOLUTION INTRAVENOUS at 13:12

## 2023-07-27 RX ADMIN — FENTANYL CITRATE 50 MCG: 50 INJECTION INTRAMUSCULAR; INTRAVENOUS at 13:14

## 2023-07-27 RX ADMIN — LIDOCAINE HYDROCHLORIDE 100 MG: 20 INJECTION INTRAVENOUS at 12:52

## 2023-07-27 RX ADMIN — KETOROLAC TROMETHAMINE 30 MG: 30 INJECTION, SOLUTION INTRAMUSCULAR; INTRAVENOUS at 13:10

## 2023-07-27 RX ADMIN — SODIUM CHLORIDE, SODIUM LACTATE, POTASSIUM CHLORIDE, AND CALCIUM CHLORIDE 125 ML/HR: .6; .31; .03; .02 INJECTION, SOLUTION INTRAVENOUS at 10:15

## 2023-07-27 RX ADMIN — PROPOFOL 200 MG: 10 INJECTION, EMULSION INTRAVENOUS at 12:52

## 2023-07-27 RX ADMIN — DEXAMETHASONE SODIUM PHOSPHATE 5 MG: 10 INJECTION INTRAMUSCULAR; INTRAVENOUS at 12:55

## 2023-07-27 RX ADMIN — FENTANYL CITRATE 50 MCG: 50 INJECTION INTRAMUSCULAR; INTRAVENOUS at 12:54

## 2023-07-27 RX ADMIN — ONDANSETRON 4 MG: 2 INJECTION INTRAMUSCULAR; INTRAVENOUS at 12:55

## 2023-07-27 RX ADMIN — MIDAZOLAM 4 MG: 1 INJECTION INTRAMUSCULAR; INTRAVENOUS at 12:42

## 2023-07-27 NOTE — INTERVAL H&P NOTE
H&P reviewed. After examining the patient I find no changes in the patients condition since the H&P had been written.     Vitals:    07/27/23 1015   BP: 135/81   Pulse: 73   Resp: 16   Temp: 97.5 °F (36.4 °C)   SpO2: 98%

## 2023-07-27 NOTE — ANESTHESIA PREPROCEDURE EVALUATION
Procedure:  HYSTEROSCOPY (Uterus)    Relevant Problems   ANESTHESIA   (+) History of unintended awareness under general anesthesia      HEMATOLOGY   (+) Anemia   (+) Iron deficiency anemia      PULMONARY   (+) Exercise-induced asthma     Very anxious about Anesthesia. Severe burning with propofol in past that traveled from waste down as well as awareness while tucking arms. Physical Exam    Airway    Mallampati score: III  TM Distance: >3 FB  Neck ROM: full     Dental   No notable dental hx     Cardiovascular  Rhythm: regular, Rate: normal, Cardiovascular exam normal    Pulmonary  Pulmonary exam normal Breath sounds clear to auscultation,     Other Findings        Anesthesia Plan  ASA Score- 2     Anesthesia Type- general with ASA Monitors. Additional Monitors:   Airway Plan: LMA. Plan Factors-    Chart reviewed. Patient summary reviewed. Patient is not a current smoker. Patient instructed to abstain from smoking on day of procedure. Patient did not smoke on day of surgery. Induction- intravenous. Postoperative Plan-     Informed Consent- Anesthetic plan and risks discussed with patient.

## 2023-07-27 NOTE — ANESTHESIA POSTPROCEDURE EVALUATION
Post-Op Assessment Note    CV Status:  Stable  Pain Score: 1    Pain management: adequate     Mental Status:  Alert and awake   Hydration Status:  Euvolemic   PONV Controlled:  Controlled   Airway Patency:  Patent      Post Op Vitals Reviewed: Yes      Staff: Anesthesiologist         No notable events documented.     BP      Temp      Pulse     Resp      SpO2      /65   Pulse 75   Temp 98 °F (36.7 °C) (Temporal)   Resp 16   Ht 5' 3" (1.6 m)   Wt 90.4 kg (199 lb 4.7 oz)   SpO2 95%   BMI 35.30 kg/m²

## 2023-07-27 NOTE — OP NOTE
OPERATIVE REPORT  PATIENT NAME: Uri Chawla    :  1990  MRN: [id number]  Pt Location: [facility] OR ROOM 04    SURGERY DATE: 2023    Surgeon(s) and Role:     * Patrick Cavazos, DO - Primary     * Judge Carlita MD - Assisting    Preop Diagnosis:  Recurrent pregnancy loss [N96]    Post-Op Diagnosis Codes:     * Recurrent pregnancy loss [N96]      * Uterine septum     Procedure(s): HYSTEROSCOPY METROPLASTY    Specimen(s):  * No specimens in log *    Estimated Blood Loss:   Minimal    IVF: 1 liter    Hysteroscopic Fluid deficit:  395 ml      Urine:  10 ml    Drains:  * No LDAs found *    Anesthesia Type:   Choice    Operative Indications:  Recurrent pregnancy loss [N96]      Operative Findings: The uterus sounded to 8 cm anteverted using an endometrial biopsy Pipelle. The hysteroscopy both ostia were well visualized and there was a small septum more prominent on the right. Complications:   None    Procedure and Technique:  The patient was identified in the preoperative holding area and taken to the operative suite where she was placed in supine position. She then underwent general LMA anesthesia and was then placed in the dorsal lithotomy position and prepped and draped in the normal sterile fashion. A time-out for safety was held according to protocol and correct patient/procedure was confirmed. It was deemed that we could begin the procedure. The bladder was emptied using a straight catheter. The cervix was visualized using a Alegre retractor posteriorly and a Samaria retractor anteriorly. The anterior lip of the cervix was grasped with single-tooth tenaculum. The uterus sounded to 8 centimeters anteverted using an endometrial biopsy Pipelle. The cervix was progressively dilated 18 [place]  utilizing the forbes dilators. The hysteroscope was inserted and careful survey of the cavity revealed the findings as noted above.   The uterine septum  Was excised using  the hysteroscopic scissors,  the ostia were lined up and the septum was excised in the same plane,  until blood vessels were encountered and the ostia were in the same plane. At the conclusion of the procedure it did appear that the septum was excised in its entirety. The septum was small. All instruments were removed from the patient's vagina and hemostasis was observed. The patient was returned to supine position and awakened from anesthesia and taken to the recovery room she was noted to be in stable condition. At the conclusion of the procedure all sponge lap needle instrument counts were correct x2 per the RN. I was present for the entire procedure.     Patient Disposition:  PACU          SIGNATURE: Tamiko Obrien DO  DATE: July 27, 2023  TIME: 1:12 PM

## 2023-09-05 ENCOUNTER — OFFICE VISIT (OUTPATIENT)
Dept: OBGYN CLINIC | Facility: CLINIC | Age: 33
End: 2023-09-05
Payer: COMMERCIAL

## 2023-09-05 VITALS
DIASTOLIC BLOOD PRESSURE: 72 MMHG | WEIGHT: 200 LBS | BODY MASS INDEX: 35.44 KG/M2 | SYSTOLIC BLOOD PRESSURE: 122 MMHG | HEIGHT: 63 IN

## 2023-09-05 DIAGNOSIS — Z01.419 ENCOUNTER FOR ANNUAL ROUTINE GYNECOLOGICAL EXAMINATION: Primary | ICD-10-CM

## 2023-09-05 DIAGNOSIS — Z12.4 SCREENING FOR CERVICAL CANCER: ICD-10-CM

## 2023-09-05 PROBLEM — N97.9 FEMALE INFERTILITY: Status: ACTIVE | Noted: 2023-09-05

## 2023-09-05 PROBLEM — A41.9 SEPSIS (HCC): Status: RESOLVED | Noted: 2018-03-19 | Resolved: 2023-09-05

## 2023-09-05 PROCEDURE — G0145 SCR C/V CYTO,THINLAYER,RESCR: HCPCS | Performed by: OBSTETRICS & GYNECOLOGY

## 2023-09-05 PROCEDURE — 99385 PREV VISIT NEW AGE 18-39: CPT | Performed by: OBSTETRICS & GYNECOLOGY

## 2023-09-05 PROCEDURE — G0476 HPV COMBO ASSAY CA SCREEN: HCPCS | Performed by: OBSTETRICS & GYNECOLOGY

## 2023-09-05 RX ORDER — DOXYCYCLINE HYCLATE 100 MG
TABLET ORAL
COMMUNITY
Start: 2023-05-31

## 2023-09-05 RX ORDER — NORETHINDRONE ACETATE AND ETHINYL ESTRADIOL 1; .02 MG/1; MG/1
TABLET ORAL
COMMUNITY
Start: 2023-06-08

## 2023-09-05 RX ORDER — LABETALOL 200 MG/1
200 TABLET, FILM COATED ORAL 2 TIMES DAILY
COMMUNITY
Start: 2023-06-21

## 2023-09-05 RX ORDER — PREDNISONE 5 MG/1
5 TABLET ORAL DAILY
COMMUNITY
Start: 2023-05-31

## 2023-09-05 RX ORDER — PROGESTERONE 50 MG/ML
INJECTION, SOLUTION INTRAMUSCULAR DAILY
COMMUNITY

## 2023-09-05 NOTE — PROGRESS NOTES
Subjective      Alma Rajput is a 35 y.o. female who presents for annual exam.      Chief Complaint   Patient presents with   • New Patient Visit     Having embryo transfer      H/o endometriosis, uterine septum, recurrent pregnancy loss. 3 prior losses - 6-7 weeks. 2 prior transfers. She does have 8 remaining embryos   Currently due for transfer    Here to establish care. No other concerns         Last Pap: 2023  Pap 2020 NILM      HPV vaccine completed:no  Current contraception: none  History of abnormal Pap smear: no  History of abnormal mammogram: lump on right breast - biopsied many years ago, benign fibroadenoma. No further sx since them        Family history of uterine or ovarian cancer: no  Family history of breast cancer: yes - maternal aunt, , dx at young age 22. Did genetic counseling/testing which was normal   Family history of colon cancer: no      Menstrual History:  OB History        3    Para        Term                AB   3    Living   0       SAB   3    IAB        Ectopic        Multiple        Live Births                        Patient's last menstrual period was 2023 (approximate).          Past Medical History:   Diagnosis Date   • ADHD    • Anemia    • Anesthesia     Pt does not want propofol given while awake- pt reports severe buring sensation   • Anxiety    • Asthma    • Endometriosis    • Hearing decreased     right ear   • History of COVID-19     2020, 2022   • Ovarian cyst    • Pneumonia 2018,2018    hospitalized in March for same   • POTS (postural orthostatic tachycardia syndrome)    • Wears glasses      Past Surgical History:   Procedure Laterality Date   • BREAST LUMPECTOMY Right    • HYSTEROSCOPY N/A 2023    Procedure: HYSTEROSCOPY;  Surgeon: Andreia Rodriguez DO;  [facility];  Service: Gynecology   • PELVIC LAPAROSCOPY Bilateral 10/28/2021    Procedure: CYSTECTOMY OVARIAN, LAP;  Surgeon: Kelvin Villareal Micaela Maradiaga DO;  [facility];  Service: Gynecology   • OH HYSTEROSCOPY BX ENDOMETRIUM&/POLYPC W/WO D&C N/A 8/27/2018    Procedure: HYSTEROSCOPY; ENDOMETRIAL BIOPSY;  Surgeon: Kentrell Weiss DO;  [facility];  Service: Gynecology   • OH HYSTEROSCOPY DIAGNOSTIC SEPARATE PROCEDURE N/A 10/28/2021    Procedure: Hysteroscopy, polypectomy, with bx;  Surgeon: Kentrell Weiss DO;  [facility];  Service: Gynecology   • OH LAPAROSCOPY W/RMVL ADNEXAL STRUCTURES N/A 8/27/2018    Procedure: LAPAROSCOPY; BILATERAL OVARIAN CYSTECTOMY; FULGERATION OF ENDOMETRIOSIS; POSSIBLE LYSIS OF ADHESIONS; POSSIBLE BILATERAL SALPINGECTOMY;  Surgeon: Kentrell Weiss DO;  [facility];  Service: Gynecology   • OH LAPS FULG/EXC OVARY VISCERA/PERITONEAL SURFACE N/A 10/28/2021    Procedure: Alcantar Plough;  Surgeon: Kentrell Weiss DO;  [facility];  Service: Gynecology     Family History   Problem Relation Age of Onset   • Breast cancer Maternal Aunt    • Colon cancer Neg Hx    • Ovarian cancer Neg Hx        Social History     Tobacco Use   • Smoking status: Never   • Smokeless tobacco: Never   Vaping Use   • Vaping Use: Never used   Substance Use Topics   • Alcohol use: Not Currently   • Drug use: No          Current Outpatient Medications:   •  B Complex Vitamins (VITAMIN B COMPLEX PO), Take 1 capsule by mouth daily  , Disp: , Rfl:   •  doxycycline hyclate (VIBRA-TABS) 100 mg tablet, TAKE 1 TABLET BY MOUTH TWICE DAILY FOR 7 DAYS, Disp: , Rfl:   •  Ferrous Sulfate 134 MG TABS, Take by mouth daily, Disp: , Rfl:   •  labetalol (NORMODYNE) 200 mg tablet, Take 200 mg by mouth 2 (two) times a day, Disp: , Rfl:   •  labetalol (NORMODYNE) 300 mg tablet, Take 300 mg by mouth 2 (two) times a day, Disp: , Rfl:   •  leuprolide (LUPRON DEPOT) 3.75 mg injection, Inject 3.75 mg into a muscle every 28 days, Disp: , Rfl:   •  leuprolide (LUPRON DEPOT) 3.75 mg injection, Inject 3.75 mg into a muscle every 28 days, Disp: , Rfl:   •  levothyroxine 50 mcg tablet, Take 50 mcg by mouth daily, Disp: , Rfl:   •  metFORMIN (GLUCOPHAGE) 500 mg tablet, Take 500 mg by mouth 2 (two) times a day with meals, Disp: , Rfl:   •  Multiple Vitamins-Minerals (multivitamin with minerals) tablet, Take 1 tablet by mouth daily, Disp: , Rfl:   •  norethindrone-ethinyl estradiol (MICROGESTIN) 1-20 MG-MCG per tablet, TAKE 1 ACTIVE TABLET BY MOUTH DAILY CONTINUOUSLY, Disp: , Rfl:   •  predniSONE 5 mg tablet, Take 5 mg by mouth daily, Disp: , Rfl:   •  Prenatal Vit-Fe Fumarate-FA (Prenatal/Iron) 28-0.8 MG TABS, Take 2 tablets by mouth in the morning, Disp: , Rfl:   •  progesterone 50 mg/mL injection, Inject into a muscle daily, Disp: , Rfl:   •  VENTOLIN  (90 Base) MCG/ACT inhaler, Inhale 2 puffs every 6 (six) hours as needed for wheezing (Patient taking differently: Inhale 2 puffs as needed for wheezing), Disp: 1 Inhaler, Rfl: 6  •  ibuprofen (MOTRIN) 600 mg tablet, Take 1 tablet (600 mg total) by mouth every 6 (six) hours as needed for mild pain or moderate pain (Patient not taking: Reported on 7/21/2023), Disp: 30 tablet, Rfl: 0  •  methylphenidate (RITALIN) 20 MG tablet, Take 20 mg by mouth daily (Patient not taking: Reported on 9/5/2023), Disp: , Rfl:     Allergies   Allergen Reactions   • Mushroom Extract Complex - Food Allergy Hives and Swelling   • Rubus Fruticosus Hives     blackberries   • Penicillins Hives and Rash   • Zithromax [Azithromycin] Hives and Rash           Review of Systems   Constitutional: Negative for appetite change, chills and fever. Eyes: Negative for visual disturbance. Respiratory: Negative for cough, chest tightness and shortness of breath. Cardiovascular: Negative for chest pain. Gastrointestinal: Negative for abdominal distention, abdominal pain, constipation, diarrhea, nausea and vomiting. Endocrine: Negative for cold intolerance and heat intolerance.    Genitourinary: Negative for difficulty urinating, dyspareunia, dysuria, frequency, genital sores, pelvic pain, urgency, vaginal bleeding, vaginal discharge and vaginal pain. Musculoskeletal: Negative for arthralgias. Neurological: Negative for light-headedness and headaches. Hematological: Does not bruise/bleed easily. Psychiatric/Behavioral: Negative for behavioral problems. All other systems reviewed and are negative. /72   Ht 5' 3" (1.6 m)   Wt 90.7 kg (200 lb)   LMP 05/02/2023 (Approximate)   BMI 35.43 kg/m²         Physical Exam  Constitutional:       General: She is not in acute distress. Appearance: Normal appearance. Genitourinary:      Vulva, bladder and urethral meatus normal.      No lesions in the vagina. Right Labia: No rash, tenderness, lesions or skin changes. Left Labia: No tenderness, lesions, skin changes or rash. No labial fusion noted. No inguinal adenopathy present in the right or left side. No vaginal discharge, erythema, tenderness, bleeding or ulceration. No vaginal prolapse present. Right Adnexa: not tender, not full and no mass present. Left Adnexa: not tender, not full and no mass present. No cervical motion tenderness, discharge, friability, lesion or polyp. Uterus is not enlarged, fixed, tender or irregular. No uterine mass detected. Uterus is anteverted. Pelvic exam was performed with patient in the lithotomy position. Breasts:     Right: No swelling, bleeding, inverted nipple, mass, nipple discharge, skin change or tenderness. Left: No swelling, bleeding, inverted nipple, mass, nipple discharge, skin change or tenderness. HENT:      Head: Normocephalic and atraumatic. Neck:      Thyroid: No thyromegaly. Cardiovascular:      Rate and Rhythm: Normal rate and regular rhythm. Pulmonary:      Effort: Pulmonary effort is normal. No accessory muscle usage or respiratory distress. Abdominal:      General: There is no distension. Palpations: Abdomen is soft. Tenderness: There is no abdominal tenderness. There is no guarding or rebound. Musculoskeletal:         General: Normal range of motion. Cervical back: Normal range of motion and neck supple. Lymphadenopathy:      Upper Body:      Right upper body: No supraclavicular or axillary adenopathy. Left upper body: No supraclavicular or axillary adenopathy. Lower Body: No right inguinal and no right inguinal adenopathy. No left inguinal and no left inguinal adenopathy. Neurological:      General: No focal deficit present. Mental Status: She is alert. Skin:     General: Skin is warm and dry. Findings: No erythema. Psychiatric:         Mood and Affect: Mood normal.         Behavior: Behavior normal.   Vitals and nursing note reviewed. Exam conducted with a chaperone present. Encounter for annual routine gynecological examination  - Discussed ACOG guidelines for pap smear screening frequency: performed today  - Discussed healthy lifestyle recommendations for diet, exercise and self breast awareness.  - Discussed ACOG recommendations for screening mammograms: not indicated today. - Discussed age based recommendations for adequate calcium and vitamin D intake. No additional osteoporosis screening indicated at this time. - Discussed ACOG recommendations for colon cancer screening: not indicated at this time. - Safe sex practices were discussed and STI testing was not desired by the patient  - Contraceptive options were reviewed: n/a mid embryo transfer  - Routine follow up in 1 year was recommended or sooner as needed. All questions and concerns were addressed.

## 2023-09-05 NOTE — ASSESSMENT & PLAN NOTE
- Discussed ACOG guidelines for pap smear screening frequency: performed today  - Discussed healthy lifestyle recommendations for diet, exercise and self breast awareness.  - Discussed ACOG recommendations for screening mammograms: not indicated today. - Discussed age based recommendations for adequate calcium and vitamin D intake. No additional osteoporosis screening indicated at this time. - Discussed ACOG recommendations for colon cancer screening: not indicated at this time. - Safe sex practices were discussed and STI testing was not desired by the patient  - Contraceptive options were reviewed: n/a mid embryo transfer  - Routine follow up in 1 year was recommended or sooner as needed. All questions and concerns were addressed.

## 2023-09-06 LAB
HPV HR 12 DNA CVX QL NAA+PROBE: NEGATIVE
HPV16 DNA CVX QL NAA+PROBE: NEGATIVE
HPV18 DNA CVX QL NAA+PROBE: NEGATIVE

## 2023-09-12 LAB
LAB AP GYN PRIMARY INTERPRETATION: NORMAL
Lab: NORMAL

## 2023-11-04 PROBLEM — Z01.419 ENCOUNTER FOR ANNUAL ROUTINE GYNECOLOGICAL EXAMINATION: Status: RESOLVED | Noted: 2023-09-05 | Resolved: 2023-11-04

## 2023-11-30 ENCOUNTER — HOSPITAL ENCOUNTER (OUTPATIENT)
Dept: MRI IMAGING | Facility: HOSPITAL | Age: 33
End: 2023-11-30
Payer: COMMERCIAL

## 2023-11-30 DIAGNOSIS — N80.03 ADENOMYOSIS OF THE UTERUS: ICD-10-CM

## 2023-11-30 PROCEDURE — G1004 CDSM NDSC: HCPCS

## 2023-11-30 PROCEDURE — 72197 MRI PELVIS W/O & W/DYE: CPT

## 2023-11-30 PROCEDURE — A9585 GADOBUTROL INJECTION: HCPCS | Performed by: OBSTETRICS & GYNECOLOGY

## 2023-11-30 RX ORDER — GADOBUTROL 604.72 MG/ML
9 INJECTION INTRAVENOUS
Status: COMPLETED | OUTPATIENT
Start: 2023-11-30 | End: 2023-11-30

## 2023-11-30 RX ADMIN — GADOBUTROL 9 ML: 604.72 INJECTION INTRAVENOUS at 17:24

## 2024-05-29 NOTE — PROGRESS NOTES
"Weight Management Medical Nutrition Assessment  Deneen is here for medical meal planning. Current wt: 201.2 lbs. She is currently in the process of harvesting her eggs. Notified that should she become pregnant she would not be seen in our practice.   Hx of  \"Yoyo train of losing and gaining weight” and looking for something more permanent to lose weight. Was previously on metformin and did not tolerate positively due to GI effects. Pt states was offered wt loss injections but rejected. Feels she has an “all or nothing approach” then falls off. Per pt dietary recall pt struggles with portion sizes and balancing meals to make them palatable for both her and her . Discussed importance of consistent meal intake and importance of snack to avoid feeling too hungry at later meal times. Discussed benefits of food logging. Hydration adequate. Meal plan & other resources provided.     **Does not want to know her weight, only if lost or gained**  Dislikes: Fish, Pork, Deer    Patient seen by Medical Provider in past 6 months:  no  Requested to schedule appointment with Medical Provider: No    Anthropometric Measurements  Start Weight (#): 201.2 lbs 6/3/24  Current Weight (#): 201.2 lbs   TBW % Change from start weight:n/a  Ideal Body Weight (#):112.5 62.5\" (BMI 25 139.3 lbs)  Goal Weight (#):Wishes to feel healthier  Highest:201#  Lowest: 139#s     Weight Loss History  Previous weight loss attempts: Self Created Diets (Portion Control, Healthy Food Choices, etc.)    Food and Nutrition Related History  Wake up: 6:30   Bed Time:9-11 pm    Food Recall   Breakfast:7-10 am depending on the workday. Avocado toast 2 with egg on top or  Waffles with syrup or  Cereal(Pops or Captain crunch) with almond milk or lactose free milk. Yogurt greek with granola with granola.                 Snack: 10-11 am or 1-2 pm if breakfast is later. Ex:  Chocolate Premier shake, or goldfish, candy, \"really anything\". 4 tbsp Pb wit happle, " "trail mix with almonds and m and ms(1/2 cup of nuts with m and ms around 10), or even a bag of larger chips(fritos or doritos).  Lunch:3 p,m or 12-1 if she eats earlier. Sometimes skip lunch  around 3 times a week(doesnt feel hungry at times). Chicken Yosef salad with 5 tbsp of dressing. Or [employer] fast-food(10 nuggets, medium frosty and medium fries). [place]ham green tea 3 cream, 3 sugar, sausage egg and cheese brkft sw. Turkey Sw with talavera, Candy cuisine, pesto chicken  Snack: snack if she eats earlier and skip if later.Sometimes has snack chocolate protein shake, or goldfish, candy, \"really anything\". 4 Pb wit apple, trail mix with almonds and m and ms(1/2 cup of nuts with m and ms around 10. Unmeasured  Dinner:6-7 pm varies: Chicken (5-6 ounce)with broccoli ( 1 cup cooked) and yukon baked potato with 1 tbsp. Will dine out once on the weekday. [facility]  Snack:Occasionally. Cup of ice cream. Sugar cookies or chip ahoy cookies store bought dough to be bakes. 3-4 cookies. Chip ahoy cookies from packet.    Beverages: water and juice  Volume of beverage intake: 100 oz of water a day( occasional juice orange juice for brkfst around 8 ounce half juice and water)    Weekends: Same  Cravings: Sweets. Ice cream, cookies, chocolate.   Trouble area of day:Feel most hungry in the morning times. Or will feel hungrier at bedtime if eating an early dinner.    Frequency of Eating out: twice a week  Food restrictions:possibly gluten allergy and lactose introlerance.  Cooking: self and   Food Shopping:  will not stick to the grocery list     Physical Activity Intake  Activity:gym 3 times a week cardio.  Frequency: 3 times a week but has decreased due to surgery and egg retrieval process  Physical limitations/barriers to exercise: IVF limits and made her feel exhuasted. Can now resume exercising until next surgery scheduled end of June.    Estimated Needs  Energy  SECA: " BMR:n/a      X 1.3 -1000 =  Elza Plata Energy Needs: BMR : 1574  1-2# loss weekly sedentary:  [id number]            1-2# loss weekly lightly active:5822-9981  Maintenance calories for sedentary activity level: 1889  Protein:61-76  gm    (1.2-1.5g/kg IBW)  Fluid: 60 oz     (35mL/kg IBW)    Nutrition Diagnosis  Yes;    Overweight/obesity  related to Excess energy intake as evidenced by  BMI more than normative standard for age and sex (obesity-grade II 35-39.9)       Nutrition Intervention    Nutrition Prescription  Calories:1902-7756  Protein: gm    Meal Plan ([place]uan [place]/Pro)   Breakfast: 250-300, 20   Snack: 150-300 kcal/10-15 g Protein  Lunch: 400 kcal/20-30 g Protein  Snack: 150-200 kcal/10-15 g Protein  Dinner: 350-450 kcal/30 g protein  Snack: skip    Nutrition Education:    Calorie controlled menu  Lean protein food choices  Healthy snack options  Food journaling tips    Nutrition Counseling:  Strategies: meal planning, portion sizes, healthy snack choices, hydration, fiber intake, protein intake, exercise, food journal      Monitoring and Evaluation:  Evaluation criteria:  Energy Intake  Meet protein needs  Maintain adequate hydration  Monitor weekly weight  Meal planning/preparation  Food journal   Decreased portions at mealtimes and snacks  Physical activity     Barriers to learning:none  Readiness to change: Preparation:  (Getting ready to change)   Comprehension: very good  Expected Compliance: very good

## 2024-06-03 ENCOUNTER — CLINICAL SUPPORT (OUTPATIENT)
Dept: BARIATRICS | Facility: CLINIC | Age: 34
End: 2024-06-03

## 2024-06-03 VITALS — HEIGHT: 63 IN | WEIGHT: 201.2 LBS | BODY MASS INDEX: 35.65 KG/M2

## 2024-06-03 DIAGNOSIS — R63.5 ABNORMAL WEIGHT GAIN: Primary | ICD-10-CM

## 2024-06-03 PROCEDURE — WMDI30

## 2024-06-03 PROCEDURE — RECHECK

## 2024-06-13 ENCOUNTER — ANESTHESIA EVENT (OUTPATIENT)
Dept: PERIOP | Facility: HOSPITAL | Age: 34
End: 2024-06-13
Payer: COMMERCIAL

## 2024-06-20 NOTE — PRE-PROCEDURE INSTRUCTIONS
Pre-Surgery Instructions:   Medication Instructions    ibuprofen (MOTRIN) 600 mg tablet Stop taking 7 days prior to surgery.    labetalol (NORMODYNE) 300 mg tablet Take day of surgery.    methylphenidate (RITALIN) 20 MG tablet Hold day of surgery.    norethindrone-ethinyl estradiol (MICROGESTIN) 1-20 MG-MCG per tablet Take night before surgery    Prenatal Vit-Fe Fumarate-FA (Prenatal/Iron) 28-0.8 MG TABS Stop taking 7 days prior to surgery.    VENTOLIN  (90 Base) MCG/ACT inhaler Uses PRN- OK to take day of surgery        Medication instructions for day surgery reviewed. Please use only a sip of water to take your instructed medications. Avoid all over the counter vitamins, supplements and NSAIDS for one week prior to surgery per anesthesia guidelines. Tylenol is ok to take as needed.     You will receive a call one business day prior to surgery with an arrival time and hospital directions. If your surgery is scheduled on a Monday, the hospital will be calling you on the Friday prior to your surgery. If you have not heard from anyone by 8pm, please call the hospital supervisor through the hospital  at [phone]. ([place] [phone] or [place] [phone]).    Do not eat or drink anything after midnight the night before your surgery, including candy, mints, lifesavers, or chewing gum. Do not drink alcohol 24hrs before your surgery. Try not to smoke at least 24hrs before your surgery.       Follow the pre surgery showering instructions as listed in the “My Surgical Experience Booklet” or otherwise provided by your surgeon's office. Do not use a blade to shave the surgical area 1 week before surgery. It is okay to use a clean electric clippers up to 24 hours before surgery. Do not apply any lotions, creams, including makeup, cologne, deodorant, or perfumes after showering on the day of your surgery. Do not use dry shampoo, hair spray, hair gel, or any type of hair products.     No contact lenses,  eye make-up, or artificial eyelashes. Remove nail polish, including gel polish, and any artificial, gel, or acrylic nails if possible. Remove all jewelry including rings and body piercing jewelry.     Wear causal clothing that is easy to take on and off. Consider your type of surgery.    Keep any valuables, jewelry, piercings at home. Please bring any specially ordered equipment (sling, braces) if indicated.    Arrange for a responsible person to drive you to and from the hospital on the day of your surgery. Please confirm the visitor policy for the day of your procedure when you receive your phone call with an arrival time.     Call the surgeon's office with any new illnesses, exposures, or additional questions prior to surgery.    Please reference your “My Surgical Experience Booklet” for additional information to prepare for your upcoming surgery.

## 2024-06-22 NOTE — H&P
H&P Exam - Gynecology   Deneen Bryant 34 y.o. female MRN: [id number]  Unit/Bed#:  Encounter: [id number]      Assessment & Plan     A/P: Pt is a 34 y.o  with endometriosis, dysmenorrhea, bilateral ovarian cysts, pelvic pain refractory to medical management presenting for hysteroscopy an diagnostic laparoscopy  1) Plan for OR on 24 for hysteroscopy and diagnostic laparoscopy    History of Present Illness     HPI:  Deneen Bryant is a 34 y.o. female who presents with endometriosis, dysmenorrhea, bilateral ovarian cysts, pelvic pain refractory to medical management.    Oncology History    No history exists.       Review of Systems   Constitutional:  Negative for chills and fever.   HENT:  Negative for ear pain and sore throat.    Eyes:  Negative for pain and visual disturbance.   Respiratory:  Negative for cough and shortness of breath.    Cardiovascular:  Negative for chest pain and palpitations.   Gastrointestinal:  Negative for abdominal pain and vomiting.   Genitourinary:  Negative for dysuria and hematuria.   Musculoskeletal:  Negative for arthralgias and back pain.   Skin:  Negative for color change and rash.   Neurological:  Negative for seizures and syncope.   All other systems reviewed and are negative.      Historical Information   Past Medical History:   Diagnosis Date    ADHD     Anemia     Anesthesia     Pt does not want propofol given while awake- pt reports severe buring sensation    Anxiety     Asthma     Endometriosis     Hearing decreased     right ear    History of COVID-19     2020, 2022    Ovarian cyst     Pneumonia 2018,2018    hospitalized in March for same    POTS (postural orthostatic tachycardia syndrome)     Wears glasses      Past Surgical History:   Procedure Laterality Date    BREAST LUMPECTOMY Right     HYSTEROSCOPY N/A 2023    Procedure: HYSTEROSCOPY;  Surgeon: Tara Budinetz, DO;  Location: [facility];  Service: Gynecology    PELVIC LAPAROSCOPY  Bilateral 10/28/2021    Procedure: CYSTECTOMY OVARIAN, LAP;  Surgeon: Tara Budinetz, DO;  [facility];  Service: Gynecology    WI HYSTEROSCOPY BX ENDOMETRIUM&/POLYPC W/WO D&C N/A 8/27/2018    Procedure: HYSTEROSCOPY; ENDOMETRIAL BIOPSY;  Surgeon: Tara Budinetz, DO;  [facility];  Service: Gynecology    WI HYSTEROSCOPY DIAGNOSTIC SEPARATE PROCEDURE N/A 10/28/2021    Procedure: Hysteroscopy, polypectomy, with bx;  Surgeon: Tara Budinetz, DO;  [facility];  Service: Gynecology    WI LAPAROSCOPY W/RMVL ADNEXAL STRUCTURES N/A 8/27/2018    Procedure: LAPAROSCOPY; BILATERAL OVARIAN CYSTECTOMY; FULGERATION OF ENDOMETRIOSIS; POSSIBLE LYSIS OF ADHESIONS; POSSIBLE BILATERAL SALPINGECTOMY;  Surgeon: Tara Budinetz, DO;  [facility];  Service: Gynecology    WI LAPS FULG/EXC OVARY VISCERA/PERITONEAL SURFACE N/A 10/28/2021    Procedure: FULG ENDOMETRIOSIS;  Surgeon: Tara Budinetz, DO;  [facility];  Service: Gynecology     OB/GYN History: endometriosis, uterine septum, recurrent pregnancy loss, ovarian cyst    Family History   Problem Relation Age of Onset    Breast cancer Maternal Aunt     Colon cancer Neg Hx     Ovarian cancer Neg Hx      Social History   Social History     Substance and Sexual Activity   Alcohol Use Not Currently    Comment: 1 x year     Social History     Substance and Sexual Activity   Drug Use No     Social History     Tobacco Use   Smoking Status Never   Smokeless Tobacco Never       Meds/Allergies   No medications prior to admission.  Allergies   Allergen Reactions    Mushroom Extract Complex - Food Allergy Hives and Throat Swelling    Rubus Fruticosus Hives     blackberries    Metformin GI Intolerance    Penicillins Hives and Rash    Zithromax [Azithromycin] Hives and Rash       Objective   There were no vitals taken for this visit.    No intake or output data in the 24 hours ending 06/22/24 4895    Physical Exam  Constitutional:       Appearance: Normal  appearance. She is normal weight.   HENT:      Head: Normocephalic.      Right Ear: External ear normal.      Left Ear: External ear normal.      Nose: Nose normal.   Eyes:      Extraocular Movements: Extraocular movements intact.      Conjunctiva/sclera: Conjunctivae normal.   Cardiovascular:      Rate and Rhythm: Normal rate.      Pulses: Normal pulses.   Pulmonary:      Effort: Pulmonary effort is normal.   Skin:     General: Skin is warm and dry.   Neurological:      General: No focal deficit present.      Mental Status: She is alert and oriented to person, place, and time. Mental status is at baseline.   Psychiatric:         Mood and Affect: Mood normal.         Behavior: Behavior normal.         Thought Content: Thought content normal.         Judgment: Judgment normal.         Lab Results:   No visits with results within 1 Day(s) from this visit.   Latest known visit with results is:   Office Visit on 09/05/2023   Component Date Value    Case Report 09/05/2023                      Value:Gynecologic Cytology Report                       Case: [id number]                                  Authorizing Provider:  Brenda Story MD      Collected:           09/05/2023 [facility]              Ordering Location:     [facility] Complete  Received:            09/05/2023 [facility]                                     [facility]                                                                 First Screen:          Karrie Mendoza                                                                  Specimen:    LIQUID-BASED PAP, SCREENING, Endocervical                                                  Primary Interpretation 09/05/2023 Negative for intraepithelial lesion or malignancy     Specimen Adequacy 09/05/2023 Satisfactory for evaluation. Absence of endocervical/transformation zone component.     Additional Information 09/05/2023                      Value:This result contains rich text formatting which cannot be displayed  here.    HPV Other HR 09/05/2023 Negative     HPV16 09/05/2023 Negative     HPV18 09/05/2023 Negative         Code Status: Prior    Maddie Gongora  6/22/2024  5:45 PM         I confirmed with Deneen that if indicated and both tubes are damaged beyond repair they would be removed however this is unlikely.  She agreed.

## 2024-06-27 ENCOUNTER — HOSPITAL ENCOUNTER (OUTPATIENT)
Facility: HOSPITAL | Age: 34
Setting detail: OUTPATIENT SURGERY
Discharge: HOME | End: 2024-06-27
Attending: OBSTETRICS & GYNECOLOGY | Admitting: OBSTETRICS & GYNECOLOGY
Payer: COMMERCIAL

## 2024-06-27 ENCOUNTER — ANESTHESIA (OUTPATIENT)
Dept: PERIOP | Facility: HOSPITAL | Age: 34
End: 2024-06-27
Payer: COMMERCIAL

## 2024-06-27 VITALS
RESPIRATION RATE: 16 BRPM | WEIGHT: 200.4 LBS | HEART RATE: 83 BPM | DIASTOLIC BLOOD PRESSURE: 65 MMHG | TEMPERATURE: 97.2 F | SYSTOLIC BLOOD PRESSURE: 134 MMHG | OXYGEN SATURATION: 96 % | HEIGHT: 63 IN | BODY MASS INDEX: 35.51 KG/M2

## 2024-06-27 DIAGNOSIS — G89.18 POST-OPERATIVE PAIN: ICD-10-CM

## 2024-06-27 DIAGNOSIS — N80.30 ENDOMETRIOSIS OF PELVIC PERITONEUM: ICD-10-CM

## 2024-06-27 LAB
EXT PREGNANCY TEST URINE: NEGATIVE
EXT. CONTROL: NORMAL

## 2024-06-27 PROCEDURE — 88305 TISSUE EXAM BY PATHOLOGIST: CPT | Performed by: PATHOLOGY

## 2024-06-27 PROCEDURE — 88342 IMHCHEM/IMCYTCHM 1ST ANTB: CPT | Performed by: PATHOLOGY

## 2024-06-27 PROCEDURE — 81025 URINE PREGNANCY TEST: CPT | Performed by: OBSTETRICS & GYNECOLOGY

## 2024-06-27 RX ORDER — FENTANYL CITRATE/PF 50 MCG/ML
25 SYRINGE (ML) INJECTION
Status: DISCONTINUED | OUTPATIENT
Start: 2024-06-27 | End: 2024-06-27 | Stop reason: HOSPADM

## 2024-06-27 RX ORDER — ALBUTEROL SULFATE 2.5 MG/3ML
2.5 SOLUTION RESPIRATORY (INHALATION) ONCE AS NEEDED
Status: DISCONTINUED | OUTPATIENT
Start: 2024-06-27 | End: 2024-06-27 | Stop reason: HOSPADM

## 2024-06-27 RX ORDER — PROPOFOL 10 MG/ML
INJECTION, EMULSION INTRAVENOUS AS NEEDED
Status: DISCONTINUED | OUTPATIENT
Start: 2024-06-27 | End: 2024-06-27

## 2024-06-27 RX ORDER — ACETAMINOPHEN 325 MG/1
975 TABLET ORAL EVERY 6 HOURS PRN
Status: CANCELLED | OUTPATIENT
Start: 2024-06-27

## 2024-06-27 RX ORDER — MIDAZOLAM HYDROCHLORIDE 2 MG/2ML
INJECTION, SOLUTION INTRAMUSCULAR; INTRAVENOUS AS NEEDED
Status: DISCONTINUED | OUTPATIENT
Start: 2024-06-27 | End: 2024-06-27

## 2024-06-27 RX ORDER — ROCURONIUM BROMIDE 10 MG/ML
INJECTION, SOLUTION INTRAVENOUS AS NEEDED
Status: DISCONTINUED | OUTPATIENT
Start: 2024-06-27 | End: 2024-06-27

## 2024-06-27 RX ORDER — PROMETHAZINE HYDROCHLORIDE 25 MG/ML
12.5 INJECTION, SOLUTION INTRAMUSCULAR; INTRAVENOUS ONCE AS NEEDED
Status: DISCONTINUED | OUTPATIENT
Start: 2024-06-27 | End: 2024-06-27 | Stop reason: HOSPADM

## 2024-06-27 RX ORDER — IBUPROFEN 600 MG/1
600 TABLET ORAL EVERY 6 HOURS PRN
Qty: 30 TABLET | Refills: 0 | Status: SHIPPED | OUTPATIENT
Start: 2024-06-27

## 2024-06-27 RX ORDER — ACETAMINOPHEN 325 MG/1
975 TABLET ORAL ONCE
Status: COMPLETED | OUTPATIENT
Start: 2024-06-27 | End: 2024-06-27

## 2024-06-27 RX ORDER — KETOROLAC TROMETHAMINE 30 MG/ML
INJECTION, SOLUTION INTRAMUSCULAR; INTRAVENOUS AS NEEDED
Status: DISCONTINUED | OUTPATIENT
Start: 2024-06-27 | End: 2024-06-27

## 2024-06-27 RX ORDER — ONDANSETRON 2 MG/ML
INJECTION INTRAMUSCULAR; INTRAVENOUS AS NEEDED
Status: DISCONTINUED | OUTPATIENT
Start: 2024-06-27 | End: 2024-06-27

## 2024-06-27 RX ORDER — ACETAMINOPHEN 325 MG/1
975 TABLET ORAL EVERY 6 HOURS PRN
Status: DISCONTINUED | OUTPATIENT
Start: 2024-06-27 | End: 2024-06-27 | Stop reason: HOSPADM

## 2024-06-27 RX ORDER — LIDOCAINE HYDROCHLORIDE 20 MG/ML
INJECTION, SOLUTION EPIDURAL; INFILTRATION; INTRACAUDAL; PERINEURAL AS NEEDED
Status: DISCONTINUED | OUTPATIENT
Start: 2024-06-27 | End: 2024-06-27

## 2024-06-27 RX ORDER — HYDROMORPHONE HCL/PF 1 MG/ML
0.5 SYRINGE (ML) INJECTION
Status: DISCONTINUED | OUTPATIENT
Start: 2024-06-27 | End: 2024-06-27 | Stop reason: HOSPADM

## 2024-06-27 RX ORDER — DEXAMETHASONE SODIUM PHOSPHATE 10 MG/ML
INJECTION, SOLUTION INTRAMUSCULAR; INTRAVENOUS AS NEEDED
Status: DISCONTINUED | OUTPATIENT
Start: 2024-06-27 | End: 2024-06-27

## 2024-06-27 RX ORDER — BUPIVACAINE HYDROCHLORIDE 2.5 MG/ML
INJECTION, SOLUTION EPIDURAL; INFILTRATION; INTRACAUDAL AS NEEDED
Status: DISCONTINUED | OUTPATIENT
Start: 2024-06-27 | End: 2024-06-27 | Stop reason: HOSPADM

## 2024-06-27 RX ORDER — SODIUM CHLORIDE, SODIUM LACTATE, POTASSIUM CHLORIDE, CALCIUM CHLORIDE 600; 310; 30; 20 MG/100ML; MG/100ML; MG/100ML; MG/100ML
125 INJECTION, SOLUTION INTRAVENOUS CONTINUOUS
Status: DISCONTINUED | OUTPATIENT
Start: 2024-06-27 | End: 2024-06-27 | Stop reason: HOSPADM

## 2024-06-27 RX ORDER — SODIUM CHLORIDE 9 MG/ML
INJECTION, SOLUTION INTRAVENOUS AS NEEDED
Status: DISCONTINUED | OUTPATIENT
Start: 2024-06-27 | End: 2024-06-27 | Stop reason: HOSPADM

## 2024-06-27 RX ORDER — ONDANSETRON 2 MG/ML
4 INJECTION INTRAMUSCULAR; INTRAVENOUS ONCE
Status: COMPLETED | OUTPATIENT
Start: 2024-06-27 | End: 2024-06-27

## 2024-06-27 RX ORDER — OXYCODONE HYDROCHLORIDE AND ACETAMINOPHEN 5; 325 MG/1; MG/1
1 TABLET ORAL EVERY 4 HOURS PRN
Qty: 10 TABLET | Refills: 0 | Status: SHIPPED | OUTPATIENT
Start: 2024-06-27 | End: 2024-07-07

## 2024-06-27 RX ORDER — ONDANSETRON 2 MG/ML
4 INJECTION INTRAMUSCULAR; INTRAVENOUS ONCE AS NEEDED
Status: DISCONTINUED | OUTPATIENT
Start: 2024-06-27 | End: 2024-06-27 | Stop reason: HOSPADM

## 2024-06-27 RX ORDER — MAGNESIUM HYDROXIDE 1200 MG/15ML
LIQUID ORAL AS NEEDED
Status: DISCONTINUED | OUTPATIENT
Start: 2024-06-27 | End: 2024-06-27 | Stop reason: HOSPADM

## 2024-06-27 RX ORDER — FENTANYL CITRATE 50 UG/ML
INJECTION, SOLUTION INTRAMUSCULAR; INTRAVENOUS AS NEEDED
Status: DISCONTINUED | OUTPATIENT
Start: 2024-06-27 | End: 2024-06-27

## 2024-06-27 RX ADMIN — LIDOCAINE HYDROCHLORIDE 50 MG: 20 INJECTION, SOLUTION EPIDURAL; INFILTRATION; INTRACAUDAL at 13:28

## 2024-06-27 RX ADMIN — FENTANYL CITRATE 50 MCG: 50 INJECTION INTRAMUSCULAR; INTRAVENOUS at 14:24

## 2024-06-27 RX ADMIN — MIDAZOLAM 4 MG: 1 INJECTION INTRAMUSCULAR; INTRAVENOUS at 13:28

## 2024-06-27 RX ADMIN — ACETAMINOPHEN 975 MG: 325 TABLET, FILM COATED ORAL at 16:53

## 2024-06-27 RX ADMIN — SODIUM CHLORIDE, SODIUM LACTATE, POTASSIUM CHLORIDE, AND CALCIUM CHLORIDE 125 ML/HR: .6; .31; .03; .02 INJECTION, SOLUTION INTRAVENOUS at 09:25

## 2024-06-27 RX ADMIN — FENTANYL CITRATE 50 MCG: 50 INJECTION INTRAMUSCULAR; INTRAVENOUS at 14:19

## 2024-06-27 RX ADMIN — ACETAMINOPHEN 975 MG: 325 TABLET, FILM COATED ORAL at 09:36

## 2024-06-27 RX ADMIN — ROCURONIUM BROMIDE 10 MG: 10 INJECTION, SOLUTION INTRAVENOUS at 14:30

## 2024-06-27 RX ADMIN — FENTANYL CITRATE 25 MCG: 50 INJECTION INTRAMUSCULAR; INTRAVENOUS at 15:23

## 2024-06-27 RX ADMIN — LIDOCAINE HYDROCHLORIDE 50 MG: 20 INJECTION, SOLUTION EPIDURAL; INFILTRATION; INTRACAUDAL at 13:33

## 2024-06-27 RX ADMIN — FENTANYL CITRATE 100 MCG: 50 INJECTION INTRAMUSCULAR; INTRAVENOUS at 13:33

## 2024-06-27 RX ADMIN — ONDANSETRON 4 MG: 2 INJECTION INTRAMUSCULAR; INTRAVENOUS at 14:59

## 2024-06-27 RX ADMIN — KETOROLAC TROMETHAMINE 30 MG: 30 INJECTION, SOLUTION INTRAMUSCULAR; INTRAVENOUS at 14:59

## 2024-06-27 RX ADMIN — FENTANYL CITRATE 25 MCG: 50 INJECTION INTRAMUSCULAR; INTRAVENOUS at 15:46

## 2024-06-27 RX ADMIN — ONDANSETRON 4 MG: 2 INJECTION INTRAMUSCULAR; INTRAVENOUS at 17:35

## 2024-06-27 RX ADMIN — PROPOFOL 200 MG: 10 INJECTION, EMULSION INTRAVENOUS at 13:33

## 2024-06-27 RX ADMIN — SUGAMMADEX 200 MG: 100 INJECTION, SOLUTION INTRAVENOUS at 15:04

## 2024-06-27 RX ADMIN — DEXAMETHASONE SODIUM PHOSPHATE 10 MG: 10 INJECTION INTRAMUSCULAR; INTRAVENOUS at 13:34

## 2024-06-27 RX ADMIN — SODIUM CHLORIDE, SODIUM LACTATE, POTASSIUM CHLORIDE, AND CALCIUM CHLORIDE: .6; .31; .03; .02 INJECTION, SOLUTION INTRAVENOUS at 14:20

## 2024-06-27 RX ADMIN — ROCURONIUM BROMIDE 50 MG: 10 INJECTION, SOLUTION INTRAVENOUS at 13:34

## 2024-06-27 NOTE — OP NOTE
OPERATIVE REPORT  PATIENT NAME: Deneen Bryant    :  1990  MRN: [id number]  Pt Location: [facility] 04    SURGERY DATE: 2024    Surgeons and Role:     * Tara Budinetz, DO - Primary     * Pola Chaparro MD - Assisting    Preop Diagnosis:  Endometriosis of pelvic peritoneum [N80.30]  Ovarian cysts  Dysmenorrhea  Pelvic pain   Abnormal uterine bleeding       Post-Op Diagnosis Codes:     * Endometriosis of pelvic peritoneum [N80.30]     * Ovarian cysts     * Dysmenorrhea      * Pelvic pain       *Abnormal uterine bleeding       * Uterine septum     Procedure(s):  LAPAROSCOPY. FULGURATION OF ENDOMETRIOSIS B/L OVARIAN CYSTECTOMY  HYSTEROSCOPY. POLYPECTOMY. METROPLASTY    Specimen(s):  ID Type Source Tests Collected by Time Destination   1 : Endometrial polyp and lining - staining for  Tissue Endometrium TISSUE EXAM Tara Budinetz, DO 2024 1418    2 : Right ovarian endometrioma Tissue Soft Tissue, Other TISSUE EXAM Tara Budinetz, DO 2024 1436    3 : Left ovarian endometrioma Tissue Soft Tissue, Other TISSUE EXAM Tara Budinetz, DO 2024 1446        Estimated Blood Loss:   Minimal    Hysteroscopic Fluid deficit: 1800 ml    Urine: 500 ml    IVF: 1200 ml    Drains:  Urethral Catheter Non-latex 16 Fr. (Active)   Number of days: 0       Anesthesia Type:   Choice    Operative Indications:  Endometriosis of pelvic peritoneum [N80.30]  Ovarian cysts  Dysmenorrhea  Pelvic pain   Abnormal uterine bleeding     Operative Findings:  The uterus sounded to 9 cm anteverted using an endometrial biopsy Pipelle.  Through the hysteroscope both ostia were visualized.  There was some polyps within the cavity.  There was a small residual septum versus adhesions noted at the fundus.    Through the laparoscope the liver edge and gallbladder appeared normal.  The uterus was enlarged and boggy consistent with adenomyosis.  The anterior cul-de-sac was normal.  The posterior cul-de-sac did contain implants of  endometriosis bilaterally along the uterosacral ligaments.  Both fallopian tubes were normal and filled and spilled upon chromopertubation.  The right ovary contained 2 cystic areas of endometriosis.  The left ovary also contained 2 cystic areas of endometriosis which were excised and drained.  The left ovary was densely adhered to the left pelvic sidewall.  There was some adhesions partially obliterating the posterior cul-de-sac.    Bladder mucosa appeared normal without any evidence of endometriosis.  The bowel mucosa also appeared normal without any evidence of endometriosis.    Given the above findings this is consistent with stage III endometriosis.    Complications:   None    Procedure and Technique:  Operative Technique     The patient was identified in the preoperative holding area and taken to the operative suite where she was placed in supine position.  She then underwent endotracheal anesthesia and was placed in the dorsal lithotomy position and prepped and draped in normal sterile fashion.  A time-out was held according to protocol and was deemed we could begin the procedure.  A pham catheter was introduced into the bladder and left the place for the duration of the procedure.     Hysteroscopy   The cervix was visualized using a Alegre retractor posteriorly and a [place] retractor anteriorly.  The anterior lip of the cervix was grasped single-tooth tenaculum.  The uterus sounded to 9 centimeters anteverted using an endometrial biopsy Pipelle.  The cervix did not require dilation and the hysteroscope was inserted and careful survey of the cavity revealed the findings as noted above.  Using the hysteroscopic graspers the polyps were removed and biopsies were taken of the lining and sent to pathology for further evaluation.  Using the hysteroscopic scissors the septum/scar tissue was excised at the fundus until blood vessels were encountered.  This concluded this portion of the procedure and the instruments  were removed.    Laparoscopy   An intrauterine manipulator was then inserted and left in place for the duration of the procedure.  Gloves were then changed and attention was turned to the patient's abdomen.     The umbilicus was everted using Allis clamps.  Towel clamps were used to tent the abdomen up.  A 5 mm incision was made at the base of the umbilicus.  The Veress needle was inserted and the abdomen was insufflated to a pressure of 15 millimeters of mercury.  A 5 millimeter trocar and port were then inserted under direct visualization and there was no injury to bowel or blood vessels at the entry point.  The entire abdomen and pelvis was inspected and there was no evidence of injury to bowel, bladder, vasculature, or other structures. Attention was then turned to the pelvis.  The patient was then placed in Trendelenburg.     Attention was turned to the patient's right lateral side and injection of 0.25% Marcaine was performed.  A 5 millimeter skin incision was then made on the patient's right lateral side through which a 5 millimeter trocar and port then passed under direct visualization.  There was no injury to bowel or blood vessels at the entry point.     Attention was then turned to the patient's left lateral side.  Injection of 0.25% Marcaine was performed.  A 5 millimeter skin incision was then made on the patient's left lateral side which a 5 millimeter trocar and port then passed under direct visualization.  There was no injury to bowel or blood vessels at the entry point.    Attention was first turned to the right ovary.  An incision was made in an avascular portion over the ovarian cortex above the endometrioma.  Chocolate like cyst fluid was expelled and suction irrigated.  Portions of the cyst wall were removed and sent to pathology for further evaluation.  The remainder was coagulated.  Attention was turned to the second right ovarian cyst wall and another incision was made and a portion of that  cyst was excised and drained and then fulgurated using the laparoscopic spatula.  Tissue was sent to pathology for further evaluation.  The third cyst was coming off of the right ovary exophytically which was removed using the laparoscopic Enseal and sent to pathology for further evaluation.  Copious suction irrigation revealed hemostasis.    Tension was then turned to the left ovary.  An incision was made using the laparoscopic scissors in an avascular portion of the ovary over the cyst.  The cyst was then drained and portions of the cyst wall were sent to pathology for further evaluation.  Remainder was coagulated using laparoscopic spatula.  A second incision was made in an avascular portion of the ovary.  Chocolate fluid was drained and the remaining area coagulated.    The implants of endometriosis along the posterior cul-de-sac and uterosacral ligaments were also coagulated using the laparoscopic spatula.    Copious suction irrigation of the pelvis revealed hemostasis.     CO2 was allowed to escape from the abdomen. Adequate hemostasis was visualized. The inferior trocars were removed under direct visualization. The laparoscope was withdrawn from the abdomen, followed by its trocar sleeve at the umbilicus.  Skin incisions were closed with 4-0 Monocryl and surgical glue placed over top.     Attention was turned to the vagina.  All instruments were removed from the patient's vagina and hemostasis was noted to be observed.  The Brown catheter was removed.  The patient was then returned to supine position and awakened from anesthesia and taken to the recovery room she was noted to be in stable condition. At the conclusion of the procedure, all needle, sponge, and instrument counts were noted to be correct x2.  I was present and scrubbed for the entire procedure       I was present for the entire procedure.    Patient Disposition:  PACU     This procedure was not performed to treat colon cancer through  resection      SIGNATURE: Tara Budinetz,   DATE: June 27, 2024  TIME: 3:01 PM

## 2024-06-27 NOTE — ANESTHESIA PREPROCEDURE EVALUATION
Procedure:  LAPAROSCOPY DIAGNOSTIC (Uterus)  HYSTEROSCOPY (Uterus)    Relevant Problems   ANESTHESIA   (+) History of unintended awareness under general anesthesia      HEMATOLOGY   (+) Anemia   (+) Iron deficiency anemia      PULMONARY   (+) Exercise-induced asthma      Past Medical History:   Diagnosis Date    ADHD     Anemia     Anesthesia     Pt does not want propofol given while awake- pt reports severe buring sensation    Anxiety     Asthma     Endometriosis     Hearing decreased     right ear    History of COVID-19     Nov 2020, Nov 2022    Ovarian cyst     Pneumonia March 2018,July 2018    hospitalized in March for same    POTS (postural orthostatic tachycardia syndrome)     Wears glasses      Lab Results   Component Value Date    WBC 10.80 (H) 07/20/2018    HGB 11.4 (L) 07/20/2018    HCT 34.1 (L) 07/20/2018    MCV 82 07/20/2018     (L) 07/20/2018         Physical Exam    Airway    Mallampati score: II  TM Distance: >3 FB  Neck ROM: full     Dental   No notable dental hx     Cardiovascular  Rhythm: regular, Rate: normal    Pulmonary   Breath sounds clear to auscultation    Other Findings  Intercisor Distance > 3cm    post-pubertal.      Anesthesia Plan  ASA Score- 2     Anesthesia Type- general with ASA Monitors.         Additional Monitors:     Airway Plan: ETT.    Comment: Discussed benefits/risks of general anesthesia including possibility of mouth/throat pain, injury to lips/teeth, nausea/vomiting, and surgical pain along with more rare complications such as stroke, MI, pneumonia, aspiration, and injury to blood vessels. All questions answered.  .       Plan Factors-Exercise tolerance (METS): >4 METS.    Chart reviewed. EKG reviewed.  Existing labs reviewed.                   Induction- intravenous.    Postoperative Plan- Plan for postoperative opioid use. Planned trial extubation        Informed Consent- Anesthetic plan and risks discussed with patient.  I personally reviewed this patient with  System downtime from 01-05. Hourly rounds completed. the CRNA. Discussed and agreed on the Anesthesia Plan with the CRNA..

## 2024-06-27 NOTE — ANESTHESIA POSTPROCEDURE EVALUATION
Post-Op Assessment Note    CV Status:  Stable  Pain Score: 0    Pain management: adequate       Mental Status:  Awake   Hydration Status:  Euvolemic   PONV Controlled:  Controlled   Airway Patency:  Patent     Post Op Vitals Reviewed: Yes    No anethesia notable event occurred.    Staff: CRNA               BP   143/78   Temp   98.1   Pulse  88   Resp   12   SpO2   95%

## 2024-09-26 ENCOUNTER — ANESTHESIA EVENT (OUTPATIENT)
Dept: PERIOP | Facility: AMBULARY SURGERY CENTER | Age: 34
End: 2024-09-26
Payer: COMMERCIAL

## 2024-09-26 NOTE — PRE-PROCEDURE INSTRUCTIONS
Pre-Surgery Instructions:   Medication Instructions    ibuprofen (MOTRIN) 600 mg tablet Stop taking 7 days prior to surgery.    labetalol (NORMODYNE) 300 mg tablet Take day of surgery.    leuprolide (LUPRON DEPOT 1 MONTH KIT) 7.5 mg injection No hold    methylphenidate (RITALIN) 20 MG tablet Hold day of surgery.    Prenatal Vit-Fe Fumarate-FA (Prenatal/Iron) 28-0.8 MG TABS Hold day of surgery.      Medication instructions for day surgery reviewed. Please use only a sip of water to take your instructed medications. Avoid all over the counter vitamins, supplements and NSAIDS for one week prior to surgery per anesthesia guidelines. Tylenol is ok to take as needed.     You will receive a call one business day prior to surgery with an arrival time and hospital directions. If your surgery is scheduled on a Monday, the hospital will be calling you on the Friday prior to your surgery. If you have not heard from anyone by 8pm, please call the hospital supervisor through the hospital  at [phone]. ([place] [phone] or [place] [phone]).    Do not eat or drink anything after midnight the night before your surgery, including candy, mints, lifesavers, or chewing gum. Do not drink alcohol 24hrs before your surgery. Try not to smoke at least 24hrs before your surgery.       Follow the pre surgery showering instructions as listed in the “My Surgical Experience Booklet” or otherwise provided by your surgeon's office. Do not use a blade to shave the surgical area 1 week before surgery. It is okay to use a clean electric clippers up to 24 hours before surgery. Do not apply any lotions, creams, including makeup, cologne, deodorant, or perfumes after showering on the day of your surgery. Do not use dry shampoo, hair spray, hair gel, or any type of hair products.     No contact lenses, eye make-up, or artificial eyelashes. Remove nail polish, including gel polish, and any artificial, gel, or acrylic nails if  possible. Remove all jewelry including rings and body piercing jewelry.     Wear causal clothing that is easy to take on and off. Consider your type of surgery.    Keep any valuables, jewelry, piercings at home. Please bring any specially ordered equipment (sling, braces) if indicated.    Arrange for a responsible person to drive you to and from the hospital on the day of your surgery. Please confirm the visitor policy for the day of your procedure when you receive your phone call with an arrival time.     Call the surgeon's office with any new illnesses, exposures, or additional questions prior to surgery.    Please reference your “My Surgical Experience Booklet” for additional information to prepare for your upcoming surgery.

## 2024-10-06 PROBLEM — N93.9 ABNORMAL UTERINE BLEEDING (AUB): Status: ACTIVE | Noted: 2024-10-06

## 2024-10-06 NOTE — H&P
H&P - OB/GYN   Name: Deneen Bryant 34 y.o. female I MRN: [id number]  Unit/Bed#: OR POOL I Date of Admission: 10/7/2024   Date of Service: 10/7/2024 I Hospital Day: 0     Assessment & Plan  Abnormal uterine bleeding (AUB)  Proceed to OR for EUA, hysteroscopy    History of Present Illness   Deneen Bryant is a 34 y.o. female who presents with AUB.    Review of Systems   Constitutional:  Negative for chills and fever.   Respiratory:  Negative for cough, shortness of breath and wheezing.    Cardiovascular:  Negative for chest pain and leg swelling.   Gastrointestinal:  Negative for abdominal pain, diarrhea, nausea and vomiting.   Genitourinary:  Negative for pelvic pain, vaginal bleeding and vaginal discharge.   Musculoskeletal:  Negative for back pain.   Neurological:  Negative for weakness, light-headedness and headaches.     Historical Information   I have reviewed the patient's PMH, PSH, Social History, Family History, Meds, and Allergies  OB/GYN History:     Objective :  Temp:  [98.1 °F (36.7 °C)] 98.1 °F (36.7 °C)  HR:  [94] 94  BP: (164)/(87) 164/87  Resp:  [16] 16  SpO2:  [97 %] 97 %  O2 Device: None (Room air)    Physical Exam  Vitals and nursing note reviewed.   Constitutional:       General: She is not in acute distress.     Appearance: She is well-developed.   HENT:      Head: Normocephalic and atraumatic.   Eyes:      Conjunctiva/sclera: Conjunctivae normal.   Cardiovascular:      Rate and Rhythm: Normal rate and regular rhythm.      Heart sounds: No murmur heard.  Pulmonary:      Effort: Pulmonary effort is normal. No respiratory distress.      Breath sounds: Normal breath sounds.   Abdominal:      Palpations: Abdomen is soft.      Tenderness: There is no abdominal tenderness.   Musculoskeletal:         General: No swelling.      Cervical back: Neck supple.   Skin:     General: Skin is warm and dry.      Capillary Refill: Capillary refill takes less than 2 seconds.   Neurological:      Mental  Status: She is alert.   Psychiatric:         Mood and Affect: Mood normal.              Rosmery Martin MD  OBGYN PGY-3

## 2024-10-07 ENCOUNTER — HOSPITAL ENCOUNTER (OUTPATIENT)
Facility: AMBULARY SURGERY CENTER | Age: 34
Setting detail: OUTPATIENT SURGERY
Discharge: HOME | End: 2024-10-07
Attending: OBSTETRICS & GYNECOLOGY | Admitting: OBSTETRICS & GYNECOLOGY
Payer: COMMERCIAL

## 2024-10-07 ENCOUNTER — ANESTHESIA (OUTPATIENT)
Dept: PERIOP | Facility: AMBULARY SURGERY CENTER | Age: 34
End: 2024-10-07
Payer: COMMERCIAL

## 2024-10-07 VITALS
SYSTOLIC BLOOD PRESSURE: 133 MMHG | DIASTOLIC BLOOD PRESSURE: 84 MMHG | HEART RATE: 80 BPM | RESPIRATION RATE: 18 BRPM | WEIGHT: 200 LBS | BODY MASS INDEX: 34.15 KG/M2 | HEIGHT: 64 IN | TEMPERATURE: 97.9 F | OXYGEN SATURATION: 97 %

## 2024-10-07 DIAGNOSIS — N80.9 ENDOMETRIOSIS, UNSPECIFIED: ICD-10-CM

## 2024-10-07 LAB
EXT PREGNANCY TEST URINE: NEGATIVE
EXT. CONTROL: NORMAL

## 2024-10-07 PROCEDURE — 88305 TISSUE EXAM BY PATHOLOGIST: CPT | Performed by: PATHOLOGY

## 2024-10-07 PROCEDURE — 88342 IMHCHEM/IMCYTCHM 1ST ANTB: CPT | Performed by: PATHOLOGY

## 2024-10-07 PROCEDURE — 81025 URINE PREGNANCY TEST: CPT | Performed by: STUDENT IN AN ORGANIZED HEALTH CARE EDUCATION/TRAINING PROGRAM

## 2024-10-07 RX ORDER — MAGNESIUM HYDROXIDE 1200 MG/15ML
LIQUID ORAL AS NEEDED
Status: DISCONTINUED | OUTPATIENT
Start: 2024-10-07 | End: 2024-10-07 | Stop reason: HOSPADM

## 2024-10-07 RX ORDER — FENTANYL CITRATE/PF 50 MCG/ML
25 SYRINGE (ML) INJECTION
Status: DISCONTINUED | OUTPATIENT
Start: 2024-10-07 | End: 2024-10-07 | Stop reason: HOSPADM

## 2024-10-07 RX ORDER — HYDROMORPHONE HCL/PF 1 MG/ML
0.5 SYRINGE (ML) INJECTION
Status: DISCONTINUED | OUTPATIENT
Start: 2024-10-07 | End: 2024-10-07 | Stop reason: HOSPADM

## 2024-10-07 RX ORDER — ONDANSETRON 2 MG/ML
4 INJECTION INTRAMUSCULAR; INTRAVENOUS ONCE AS NEEDED
Status: DISCONTINUED | OUTPATIENT
Start: 2024-10-07 | End: 2024-10-07 | Stop reason: HOSPADM

## 2024-10-07 RX ORDER — LIDOCAINE HYDROCHLORIDE 10 MG/ML
0.5 INJECTION, SOLUTION EPIDURAL; INFILTRATION; INTRACAUDAL; PERINEURAL ONCE AS NEEDED
Status: DISCONTINUED | OUTPATIENT
Start: 2024-10-07 | End: 2024-10-07 | Stop reason: HOSPADM

## 2024-10-07 RX ORDER — FENTANYL CITRATE 50 UG/ML
INJECTION, SOLUTION INTRAMUSCULAR; INTRAVENOUS AS NEEDED
Status: DISCONTINUED | OUTPATIENT
Start: 2024-10-07 | End: 2024-10-07

## 2024-10-07 RX ORDER — ONDANSETRON 2 MG/ML
INJECTION INTRAMUSCULAR; INTRAVENOUS AS NEEDED
Status: DISCONTINUED | OUTPATIENT
Start: 2024-10-07 | End: 2024-10-07

## 2024-10-07 RX ORDER — IBUPROFEN 600 MG/1
600 TABLET, FILM COATED ORAL EVERY 6 HOURS PRN
Status: DISCONTINUED | OUTPATIENT
Start: 2024-10-07 | End: 2024-10-07 | Stop reason: HOSPADM

## 2024-10-07 RX ORDER — PROPOFOL 10 MG/ML
INJECTION, EMULSION INTRAVENOUS CONTINUOUS PRN
Status: DISCONTINUED | OUTPATIENT
Start: 2024-10-07 | End: 2024-10-07

## 2024-10-07 RX ORDER — PROMETHAZINE HYDROCHLORIDE 25 MG/ML
6.25 INJECTION, SOLUTION INTRAMUSCULAR; INTRAVENOUS ONCE
Status: DISCONTINUED | OUTPATIENT
Start: 2024-10-07 | End: 2024-10-07 | Stop reason: HOSPADM

## 2024-10-07 RX ORDER — SODIUM CHLORIDE, SODIUM LACTATE, POTASSIUM CHLORIDE, CALCIUM CHLORIDE 600; 310; 30; 20 MG/100ML; MG/100ML; MG/100ML; MG/100ML
INJECTION, SOLUTION INTRAVENOUS CONTINUOUS PRN
Status: DISCONTINUED | OUTPATIENT
Start: 2024-10-07 | End: 2024-10-07

## 2024-10-07 RX ORDER — SODIUM CHLORIDE, SODIUM LACTATE, POTASSIUM CHLORIDE, CALCIUM CHLORIDE 600; 310; 30; 20 MG/100ML; MG/100ML; MG/100ML; MG/100ML
75 INJECTION, SOLUTION INTRAVENOUS CONTINUOUS
Status: DISCONTINUED | OUTPATIENT
Start: 2024-10-07 | End: 2024-10-07 | Stop reason: HOSPADM

## 2024-10-07 RX ORDER — ACETAMINOPHEN 325 MG/1
975 TABLET ORAL EVERY 6 HOURS PRN
Status: DISCONTINUED | OUTPATIENT
Start: 2024-10-07 | End: 2024-10-07 | Stop reason: HOSPADM

## 2024-10-07 RX ORDER — KETOROLAC TROMETHAMINE 30 MG/ML
INJECTION, SOLUTION INTRAMUSCULAR; INTRAVENOUS AS NEEDED
Status: DISCONTINUED | OUTPATIENT
Start: 2024-10-07 | End: 2024-10-07

## 2024-10-07 RX ORDER — DEXAMETHASONE SODIUM PHOSPHATE 10 MG/ML
INJECTION, SOLUTION INTRAMUSCULAR; INTRAVENOUS AS NEEDED
Status: DISCONTINUED | OUTPATIENT
Start: 2024-10-07 | End: 2024-10-07

## 2024-10-07 RX ORDER — MIDAZOLAM HYDROCHLORIDE 2 MG/2ML
INJECTION, SOLUTION INTRAMUSCULAR; INTRAVENOUS AS NEEDED
Status: DISCONTINUED | OUTPATIENT
Start: 2024-10-07 | End: 2024-10-07

## 2024-10-07 RX ORDER — PROPOFOL 10 MG/ML
INJECTION, EMULSION INTRAVENOUS AS NEEDED
Status: DISCONTINUED | OUTPATIENT
Start: 2024-10-07 | End: 2024-10-07

## 2024-10-07 RX ORDER — ACYCLOVIR 200 MG/1
400 CAPSULE ORAL 3 TIMES DAILY
COMMUNITY

## 2024-10-07 RX ORDER — SODIUM CHLORIDE, SODIUM LACTATE, POTASSIUM CHLORIDE, CALCIUM CHLORIDE 600; 310; 30; 20 MG/100ML; MG/100ML; MG/100ML; MG/100ML
125 INJECTION, SOLUTION INTRAVENOUS CONTINUOUS
Status: DISCONTINUED | OUTPATIENT
Start: 2024-10-07 | End: 2024-10-07

## 2024-10-07 RX ADMIN — DEXAMETHASONE SODIUM PHOSPHATE 10 MG: 10 INJECTION, SOLUTION INTRAMUSCULAR; INTRAVENOUS at 09:45

## 2024-10-07 RX ADMIN — MIDAZOLAM 2 MG: 1 INJECTION INTRAMUSCULAR; INTRAVENOUS at 09:41

## 2024-10-07 RX ADMIN — PROPOFOL 30 MG: 10 INJECTION, EMULSION INTRAVENOUS at 09:45

## 2024-10-07 RX ADMIN — SODIUM CHLORIDE, SODIUM LACTATE, POTASSIUM CHLORIDE, AND CALCIUM CHLORIDE: .6; .31; .03; .02 INJECTION, SOLUTION INTRAVENOUS at 08:29

## 2024-10-07 RX ADMIN — KETOROLAC TROMETHAMINE 30 MG: 30 INJECTION, SOLUTION INTRAMUSCULAR; INTRAVENOUS at 09:47

## 2024-10-07 RX ADMIN — FENTANYL CITRATE 25 MCG: 50 INJECTION INTRAMUSCULAR; INTRAVENOUS at 10:03

## 2024-10-07 RX ADMIN — PROPOFOL 120 MCG/KG/MIN: 10 INJECTION, EMULSION INTRAVENOUS at 09:45

## 2024-10-07 RX ADMIN — FENTANYL CITRATE 25 MCG: 50 INJECTION INTRAMUSCULAR; INTRAVENOUS at 09:54

## 2024-10-07 RX ADMIN — ONDANSETRON 4 MG: 2 INJECTION INTRAMUSCULAR; INTRAVENOUS at 09:45

## 2024-10-07 RX ADMIN — DEXMEDETOMIDINE 8 MCG: 100 INJECTION, SOLUTION INTRAVENOUS at 09:45

## 2024-10-07 RX ADMIN — FENTANYL CITRATE 50 MCG: 50 INJECTION INTRAMUSCULAR; INTRAVENOUS at 09:45

## 2024-10-07 NOTE — ANESTHESIA PREPROCEDURE EVALUATION
Procedure:  HYSTEROSCOPY (Uterus)    Relevant Problems   ANESTHESIA   (+) History of unintended awareness under general anesthesia      HEMATOLOGY   (+) Anemia   (+) Iron deficiency anemia      PULMONARY   (+) Exercise-induced asthma      Neurology/Sleep   (+) POTS (postural orthostatic tachycardia syndrome)        Physical Exam    Airway    Mallampati score: II  TM Distance: >3 FB  Neck ROM: full     Dental   No notable dental hx     Cardiovascular  Rhythm: regular, Rate: normal, Cardiovascular exam normal    Pulmonary  Pulmonary exam normal Breath sounds clear to auscultation    Other Findings  post-pubertal.      Anesthesia Plan  ASA Score- 2     Anesthesia Type- IV sedation with anesthesia with ASA Monitors.         Additional Monitors:     Airway Plan:     Comment: Discussed risks/benefits, including medication reactions, awareness, aspiration, and serious/life threatening complications. Plan to maintain native airway with IVGA, monitored with EtCO2.       Plan Factors-Exercise tolerance (METS): >4 METS.    Chart reviewed.    Patient summary reviewed.      Patient instructed to abstain from smoking on day of procedure. Patient did not smoke on day of surgery.            Induction- intravenous.    Postoperative Plan-         Informed Consent- Anesthetic plan and risks discussed with patient.  I personally reviewed this patient with the CRNA. Discussed and agreed on the Anesthesia Plan with the CRNA..

## 2024-10-07 NOTE — OP NOTE
OPERATIVE REPORT  PATIENT NAME: Deneen Bryant    :  1990  MRN: [id number]  Pt Location: [facility] OR [facility]OM 06    SURGERY DATE: 10/7/2024    Surgeons and Role:     * Tara Budinetz, DO - Primary     * Rosmery Martin MD - Assisting    Preop Diagnosis:  Endometriosis, unspecified [N80.9]  Abnormal uterine bleeding  Dysmenorrhea    Post-Op Diagnosis Codes:     * Endometriosis, unspecified [N80.9]     * Abnormal uterine bleeding     * Intrauterine adhesion     Procedure(s):  HYSTEROSCOPY;EUA Endometrial Biopsy; LYSIS OF ADHESIONS    Specimen(s):  ID Type Source Tests Collected by Time Destination   1 : [facility]- SEE COMMENTS Tissue Endometrium TISSUE EXAM Tara Budinetz, DO 10/7/2024 0959        Estimated Blood Loss:   Minimal    IVF: 600 ml     Hysteroscopic Fluid deficit: 620 ml    Drains:  * No LDAs found *    Anesthesia Type:   Choice    Operative Indications:  Endometriosis, unspecified [N80.9]  Abnormal uterine bleeding  Dysmenorrhea    Operative Findings:  Uterus sounded to 8 cm anteverted using an endometrial biopsy Pipelle.  Through the hysteroscope both ostia were visualized.  There was an adhesion band at the fundus otherwise uterine cavity was normal.      Complications:   None    Procedure and Technique:  The patient was identified in the preoperative holding area and taken to the operative suite where she was placed in supine position.  She then underwent anesthesia and was placed in the dorsal lithotomy position and prepped and draped in normal sterile fashion.  A time-out was held according to protocol and it was seemed we could begin the procedure.      The cervix was visualized using a Alegre retractor posteriorly and a Christiano retractor anteriorly.  The anterior lip of the cervix grasped with single-tooth tenaculum.  The uterus sounded to 8 centimeters anteverted using an endometrial biopsy Pipelle.  The cervix was progressively dilated to 21 Using forbes dilators.  The Myosure hysteroscope was then  inserted and careful survey of the cavity revealed the findings as noted above.  Both ostia were well visualized.  Biopsies were taken of the lining and all tissue was sent to pathology for further evaluation.  Using the hysteroscopic scissors the fundal adhesion band was lysed.     Normal anatomy was then restored. This concluded the procedure and all instruments removed the patient's vagina.  Hemostasis was observed.  The patient was then awakened from anesthesia and taken to the recovery for which she was noted to be in stable condition.  It should be noted at the end of procedure all sponge lap needle instrument counts were correct x2 per the RN.     I was present for the entire procedure.    Patient Disposition:  PACU              SIGNATURE: Tara Budinetz, DO  DATE: October 7, 2024  TIME: 10:05 AM

## 2024-10-07 NOTE — ANESTHESIA POSTPROCEDURE EVALUATION
Post-Op Assessment Note    CV Status:  Stable  Pain Score: 0    Pain management: adequate    Multimodal analgesia used between 6 hours prior to anesthesia start to PACU discharge    Mental Status:  Alert and awake   Hydration Status:  Stable   PONV Controlled:  None   Airway Patency:  Patent     Post Op Vitals Reviewed: Yes    No anethesia notable event occurred.    Staff: CRNA               BP   124/77   Temp   97.2   Pulse  78   Resp   16   SpO2   98

## 2024-10-08 NOTE — ANESTHESIA POSTPROCEDURE EVALUATION
Post-Op Assessment Note    CV Status:  Stable  Pain Score: 0    Pain management: adequate       Mental Status:  Awake and sleepy   Hydration Status:  Stable   PONV Controlled:  None   Airway Patency:  Patent     Post Op Vitals Reviewed: Yes    No anethesia notable event occurred.    Staff: Anesthesiologist           Last Filed PACU Vitals:  Vitals Value Taken Time   Temp 97 °F (36.1 °C) 10/07/24 1027   Pulse 78 10/07/24 1027   /79 10/07/24 1027   Resp 20 10/07/24 1027   SpO2 95 % 10/07/24 1027   Vitals shown include unfiled device data.    Modified Jesika:  Activity: 2 (10/7/2024 11:07 AM)  Respiration: 2 (10/7/2024 11:07 AM)  Circulation: 2 (10/7/2024 11:07 AM)  Consciousness: 2 (10/7/2024 11:07 AM)  Oxygen Saturation: 2 (10/7/2024 11:07 AM)  Modified Jesika Score: 10 (10/7/2024 11:07 AM)

## 2024-10-21 ENCOUNTER — ANNUAL EXAM (OUTPATIENT)
Dept: OBGYN CLINIC | Facility: CLINIC | Age: 34
End: 2024-10-21
Payer: COMMERCIAL

## 2024-10-21 VITALS — BODY MASS INDEX: 34.33 KG/M2 | HEIGHT: 64 IN | SYSTOLIC BLOOD PRESSURE: 130 MMHG | DIASTOLIC BLOOD PRESSURE: 68 MMHG

## 2024-10-21 DIAGNOSIS — Z01.419 ENCOUNTER FOR ANNUAL ROUTINE GYNECOLOGICAL EXAMINATION: Primary | ICD-10-CM

## 2024-10-21 PROCEDURE — 99395 PREV VISIT EST AGE 18-39: CPT | Performed by: OBSTETRICS & GYNECOLOGY

## 2024-10-21 NOTE — PROGRESS NOTES
Subjective      Deneen Bryant is a 34 y.o. female who presents for annual exam.      Chief Complaint   Patient presents with    Gynecologic Exam     Yearly     Recent hysteroscopy with FRANCISCO (few weeks ago) for lysis of adhesions  Transfer anticipated late November/early December   SAB last November   Currently on lupron         Last Pap: 2023 NILM/HPV neg       HPV vaccine completed:no  Current contraception: none  History of abnormal Pap smear: no  History of abnormal mammogram: yes - h/o fibroadenoma right breast      Family history of uterine or ovarian cancer: no  Family history of breast cancer: yes - maternal aunt, , dx at young age 25. Did genetic counseling/testing which was normal   Family history of colon cancer: no      Menstrual History:  OB History          4    Para        Term                AB   4    Living   0         SAB   4    IAB        Ectopic        Multiple        Live Births                        Patient's last menstrual period was 2023 (approximate).         Past Medical History:   Diagnosis Date    ADHD     Anemia     Anesthesia     Pt does not want propofol given while awake- pt reports severe buring sensation    Anxiety     Asthma     Endometriosis     Female infertility     Hearing decreased     right ear    History of COVID-19     2020, 2022    Hypertension 2020    Occured after having covid    Miscarriage     Ovarian cyst     Pneumonia 2018,2018    hospitalized in March for same    POTS (postural orthostatic tachycardia syndrome)     Wears glasses      Past Surgical History:   Procedure Laterality Date    BREAST LUMPECTOMY Right     HYSTEROSCOPY N/A 2023    Procedure: HYSTEROSCOPY;  Surgeon: Tara Budinetz, DO;  [facility];  Service: Gynecology    HYSTEROSCOPY N/A 2024    Procedure: HYSTEROSCOPY, POLYPECTOMY, metroplasty;  Surgeon: Tara Budinetz, DO;  [facility];  Service: Gynecology     LAPAROSCOPY N/A 6/27/2024    Procedure: LAPAROSCOPY, fulguration of endometriosis, B/L ovarian cystectomy;  Surgeon: Tara Budinetz, DO;  [facility];  Service: Gynecology    PELVIC LAPAROSCOPY Bilateral 10/28/2021    Procedure: CYSTECTOMY OVARIAN, LAP;  Surgeon: Tara Budinetz, DO;  [facility];  Service: Gynecology    PA HYSTEROSCOPY BX ENDOMETRIUM&/POLYPC W/WO D&C N/A 8/27/2018    Procedure: HYSTEROSCOPY; ENDOMETRIAL BIOPSY;  Surgeon: Tara Budinetz, DO;  [facility];  Service: Gynecology    PA HYSTEROSCOPY BX ENDOMETRIUM&/POLYPC W/WO D&C N/A 10/7/2024    Procedure: HYSTEROSCOPY;EUA Endometrial Biopsy; LYSIS OF ADHESIONS;  Surgeon: Tara Budinetz, DO;  [facility];  Service: Gynecology    PA HYSTEROSCOPY DIAGNOSTIC SEPARATE PROCEDURE N/A 10/28/2021    Procedure: Hysteroscopy, polypectomy, with bx;  Surgeon: Tara Budinetz, DO;  [facility];  Service: Gynecology    PA LAPAROSCOPY W/RMVL ADNEXAL STRUCTURES N/A 8/27/2018    Procedure: LAPAROSCOPY; BILATERAL OVARIAN CYSTECTOMY; FULGERATION OF ENDOMETRIOSIS; POSSIBLE LYSIS OF ADHESIONS; POSSIBLE BILATERAL SALPINGECTOMY;  Surgeon: Tara Budinetz, DO;  [facility];  Service: Gynecology    PA LAPS FULG/EXC OVARY VISCERA/PERITONEAL SURFACE N/A 10/28/2021    Procedure: FULG ENDOMETRIOSIS;  Surgeon: Tara Budinetz, DO;  [facility];  Service: Gynecology     Family History   Problem Relation Age of Onset    Breast cancer Maternal Aunt     Colon cancer Neg Hx     Ovarian cancer Neg Hx        Social History     Tobacco Use    Smoking status: Never    Smokeless tobacco: Never   Vaping Use    Vaping status: Never Used   Substance Use Topics    Alcohol use: Not Currently     Comment: Occassionally    Drug use: No          Current Outpatient Medications:     acyclovir (ZOVIRAX) 200 mg capsule, Take 400 mg by mouth 3 (three) times a day, Disp: , Rfl:     labetalol (NORMODYNE) 300 mg tablet, Take 300 mg by mouth 2  (two) times a day, Disp: , Rfl:     methylphenidate (RITALIN) 20 MG tablet, Take 30 mg by mouth daily Monday through Fridays, Disp: , Rfl:     Prenatal Vit-Fe Fumarate-FA (Prenatal/Iron) 28-0.8 MG TABS, Take 2 tablets by mouth in the morning, Disp: , Rfl:     ibuprofen (MOTRIN) 600 mg tablet, Take 1 tablet (600 mg total) by mouth every 6 (six) hours as needed for mild pain or moderate pain, Disp: 30 tablet, Rfl: 0    leuprolide (LUPRON DEPOT 1 MONTH KIT) 7.5 mg injection, Inject 7.5 mg into a muscle every 28 days Next injection Oct 1st (Patient not taking: Reported on 10/21/2024), Disp: , Rfl:     norethindrone-ethinyl estradiol (MICROGESTIN) 1-20 MG-MCG per tablet, TAKE 1 ACTIVE TABLET BY MOUTH DAILY CONTINUOUSLY (Patient not taking: Reported on 9/26/2024), Disp: , Rfl:     Allergies   Allergen Reactions    Bactrim [Sulfamethoxazole-Trimethoprim] Delirium    Mushroom Extract Complex - Food Allergy Hives and Throat Swelling    Rubus Fruticosus Hives     blackberries    Metformin GI Intolerance    Penicillins Hives and Rash    Zithromax [Azithromycin] Hives and Rash           Review of Systems   Constitutional:  Negative for appetite change, chills and fever.   Eyes:  Negative for visual disturbance.   Respiratory:  Negative for cough, chest tightness and shortness of breath.    Cardiovascular:  Negative for chest pain.   Gastrointestinal:  Negative for abdominal distention, abdominal pain, constipation, diarrhea, nausea and vomiting.   Endocrine: Negative for cold intolerance and heat intolerance.   Genitourinary:  Negative for difficulty urinating, dyspareunia, dysuria, frequency, genital sores, pelvic pain, urgency, vaginal bleeding, vaginal discharge and vaginal pain.   Musculoskeletal:  Negative for arthralgias.   Neurological:  Negative for light-headedness and headaches.   Hematological:  Does not bruise/bleed easily.   Psychiatric/Behavioral:  Negative for behavioral problems.    All other systems reviewed  "and are negative.      /68 (BP Location: Right arm, Patient Position: Sitting, Cuff Size: Standard)   Ht 5' 4\" (1.626 m)   LMP 03/08/2023 (Approximate)   BMI 34.33 kg/m²         Physical Exam  Constitutional:       General: She is not in acute distress.     Appearance: Normal appearance.   Genitourinary:      Vulva, bladder and urethral meatus normal.      No lesions in the vagina.      Right Labia: No rash, tenderness, lesions or skin changes.     Left Labia: No tenderness, lesions, skin changes or rash.     No labial fusion noted.      No inguinal adenopathy present in the right or left side.     No vaginal discharge, erythema, tenderness, bleeding or ulceration.      No vaginal prolapse present.       Right Adnexa: not tender, not full and no mass present.     Left Adnexa: not tender, not full and no mass present.     No cervical motion tenderness, discharge, friability, lesion or polyp.      Uterus is not enlarged, fixed, tender or irregular.      No uterine mass detected.     Uterus is anteverted.      Pelvic exam was performed with patient in the lithotomy position.   Breasts:     Right: No swelling, bleeding, inverted nipple, mass, nipple discharge, skin change or tenderness.      Left: No swelling, bleeding, inverted nipple, mass, nipple discharge, skin change or tenderness.   HENT:      Head: Normocephalic and atraumatic.   Neck:      Thyroid: No thyromegaly.   Cardiovascular:      Rate and Rhythm: Normal rate and regular rhythm.   Pulmonary:      Effort: Pulmonary effort is normal. No accessory muscle usage or respiratory distress.   Abdominal:      General: There is no distension.      Palpations: Abdomen is soft.      Tenderness: There is no abdominal tenderness. There is no guarding or rebound.   Musculoskeletal:         General: Normal range of motion.      Cervical back: Normal range of motion and neck supple.   Lymphadenopathy:      Upper Body:      Right upper body: No supraclavicular or " axillary adenopathy.      Left upper body: No supraclavicular or axillary adenopathy.      Lower Body: No right inguinal and no right inguinal adenopathy. No left inguinal and no left inguinal adenopathy.   Neurological:      General: No focal deficit present.      Mental Status: She is alert.   Skin:     General: Skin is warm and dry.      Findings: No erythema.   Psychiatric:         Mood and Affect: Mood normal.         Behavior: Behavior normal.   Vitals and nursing note reviewed. Exam conducted with a chaperone present.             Encounter for annual routine gynecological examination  - Discussed ACOG guidelines for pap smear screening frequency: not indicated today. Recommend repeat pap smear in [id number]  - Discussed healthy lifestyle recommendations for diet, exercise and self breast awareness.  - Discussed ACOG recommendations for screening mammograms: not indicated today.  - Discussed age based recommendations for adequate calcium and vitamin D intake. No additional osteoporosis screening indicated at this time.  - Discussed ACOG recommendations for colon cancer screening: not indicated at this time.  - Safe sex practices were discussed and STI testing was not desired by the patient  - Contraceptive options were reviewed: n/a pursuing FET  - Routine follow up in 1 year was recommended or sooner as needed. All questions and concerns were addressed.

## 2024-10-21 NOTE — ASSESSMENT & PLAN NOTE
- Discussed ACOG guidelines for pap smear screening frequency: not indicated today. Recommend repeat pap smear in [id number]  - Discussed healthy lifestyle recommendations for diet, exercise and self breast awareness.  - Discussed ACOG recommendations for screening mammograms: not indicated today.  - Discussed age based recommendations for adequate calcium and vitamin D intake. No additional osteoporosis screening indicated at this time.  - Discussed ACOG recommendations for colon cancer screening: not indicated at this time.  - Safe sex practices were discussed and STI testing was not desired by the patient  - Contraceptive options were reviewed: n/a pursuing FET  - Routine follow up in 1 year was recommended or sooner as needed. All questions and concerns were addressed.

## 2024-11-20 PROBLEM — Z01.419 ENCOUNTER FOR ANNUAL ROUTINE GYNECOLOGICAL EXAMINATION: Status: RESOLVED | Noted: 2024-10-21 | Resolved: 2024-11-20

## 2025-01-13 ENCOUNTER — OFFICE VISIT (OUTPATIENT)
Dept: URGENT CARE | Facility: CLINIC | Age: 35
End: 2025-01-13
Payer: COMMERCIAL

## 2025-01-13 VITALS
OXYGEN SATURATION: 97 % | HEIGHT: 63 IN | HEART RATE: 96 BPM | SYSTOLIC BLOOD PRESSURE: 131 MMHG | RESPIRATION RATE: 18 BRPM | DIASTOLIC BLOOD PRESSURE: 89 MMHG | TEMPERATURE: 97.8 F | BODY MASS INDEX: 36.54 KG/M2 | WEIGHT: 206.2 LBS

## 2025-01-13 DIAGNOSIS — M65.4 DE QUERVAIN'S TENOSYNOVITIS, RIGHT: Primary | ICD-10-CM

## 2025-01-13 PROCEDURE — 99203 OFFICE O/P NEW LOW 30 MIN: CPT

## 2025-01-13 RX ORDER — METHYLPREDNISOLONE 4 MG/1
TABLET ORAL
Qty: 21 TABLET | Refills: 0 | Status: SHIPPED | OUTPATIENT
Start: 2025-01-13

## 2025-01-13 RX ORDER — BUPROPION HYDROCHLORIDE 150 MG/1
1 TABLET ORAL EVERY MORNING
COMMUNITY
Start: 2025-01-02 | End: 2026-01-02

## 2025-01-13 NOTE — PATIENT INSTRUCTIONS
"Patient Education     de Quervain tendinopathy   The Basics   Written by the doctors and editors at [facility]   What is de Quervain tendinopathy? -- de Quervain tendinopathy is a condition that causes pain in the thumb and wrist. It is caused by a problem with a tendon. Tendons are strong bands of tissue that connect muscles to bones. de Quervain tendinopathy is sometimes called \"de Quervain tenosynovitis.\"  de Quervain tendinopathy involves tendons that connect the forearm muscles to the thumb. These tendons and the covering around them get inflamed. This causes symptoms.  Most often, de Quervain tendinopathy happens when people use their wrist and thumb too much in certain ways. This includes gripping or grabbing objects (like a tool, golf club, or tennis racket) over and over. But, it can also happen to people for no obvious reason.  What are the symptoms of de Quervain tendinopathy? -- Symptoms include:   Pain in the wrist or thumb   Trouble gripping objects   Swelling in the wrist  Will I need tests? -- Probably not. Your doctor can usually tell if you have de Quervain tendinopathy by learning about your symptoms and doing an exam. During the exam, they will carefully check your thumb, hand, and wrist.  How is de Quervain tendinopathy treated? -- Treatment includes:   Resting your thumb - To avoid moving your thumb, you can wear a splint made for keeping the thumb still.   Ice - You can put a cold gel pack, bag of ice, or bag of frozen vegetables on the painful or swollen area every 4 to 6 hours, for 15 minutes each time.   Pain-relieving medicines called \"NSAIDs\" - NSAIDs are a large group of medicines that includes ibuprofen (sample brand names: Advil, Motrin) and naproxen (sample brand name: Aleve).   Exercises - After your symptoms improve, your doctor or nurse will show you exercises to help your wrist and thumb move more easily.  If your symptoms don't get better with treatment, your doctor might recommend " other treatments. These can include:   Getting a shot of a steroid medicine around the tendon in your wrist - This can help with pain.   Surgery to cut or loosen the covering around the tendon  All topics are updated as new evidence becomes available and our peer review process is complete.  This topic retrieved from [employer] on: Feb 26, 2024.  Topic 73223 Version 12.0  Release: 32.2.4 - C32.56  © 2024 UpToDate, Inc. and/or its affiliates. All rights reserved.  Consumer Information Use and Disclaimer   Disclaimer: This generalized information is a limited summary of diagnosis, treatment, and/or medication information. It is not meant to be comprehensive and should be used as a tool to help the user understand and/or assess potential diagnostic and treatment options. It does NOT include all information about conditions, treatments, medications, side effects, or risks that may apply to a specific patient. It is not intended to be medical advice or a substitute for the medical advice, diagnosis, or treatment of a health care provider based on the health care provider's examination and assessment of a patient's specific and unique circumstances. Patients must speak with a health care provider for complete information about their health, medical questions, and treatment options, including any risks or benefits regarding use of medications. This information does not endorse any treatments or medications as safe, effective, or approved for treating a specific patient. Up[employer], Inc[employer] and its affiliates disclaim any warranty or liability relating to this information or the use thereof.The use of this information is governed by the Terms of Use, available at https://www.wolterskluwer.com/en/know/clinical-effectiveness-terms. 2024© UpToDate, Inc. and its affiliates and/or licensors. All rights reserved.  Copyright   © 2024 Up[employer]Date, [employer]. and/or its affiliates. All rights reserved.

## 2025-01-13 NOTE — PROGRESS NOTES
[facility]        NAME: Deneen Bryant is a 34 y.o. female  : 1990    MRN: [id number]  DATE: 2025  TIME: 11:46 AM    Assessment and Plan   De Quervain's tenosynovitis, right [M65.4]  1. De Quervain's tenosynovitis, right  methylPREDNISolone 4 MG tablet therapy pack    Ambulatory Referral to Orthopedic Surgery      Recommended to follow up with orthopedic surgery. Patient agreeable to plan.  Patient Instructions     Follow up with PCP in 3-5 days.  Proceed to ER if symptoms worsen.    If tests have been performed at [facility], our office will contact you with results if changes need to be made to the care plan discussed with you at the visit.  You can review your full results on [facility].    Chief Complaint     Chief Complaint   Patient presents with    Wrist Pain     Right wrist pain and swelling that started Saturday. No injury       History of Present Illness     Pt is a 34-year-old female presenting for right wrist pain and swelling x 2 days. She notes she did not injure it at all.   She notes pain over the radial aspect of the wrist. She does note she was working with a plant on Saturday, and thought maybe she was having an allergic reaction, but the swelling never went down. She states she did try Bendaryl with minimal relief. She denies a hx of symptoms like this in the past. Denies any other symptoms at this time.    Wrist Pain   Pertinent negatives include no fever.       Review of Systems   Review of Systems   Constitutional:  Negative for chills and fever.   Respiratory:  Negative for shortness of breath and wheezing.    Gastrointestinal:  Negative for nausea and vomiting.   Genitourinary: Negative.    Musculoskeletal:  Positive for arthralgias (mild pain) and joint swelling.   Skin:  Negative for color change and rash.   Neurological: Negative.        Current Medications       Current Outpatient Medications:     acyclovir (ZOVIRAX) 200 mg capsule, Take 400 mg by  mouth 3 (three) times a day, Disp: , Rfl:     buPROPion (WELLBUTRIN XL) 150 mg 24 hr tablet, Take 1 tablet by mouth every morning, Disp: , Rfl:     labetalol (NORMODYNE) 300 mg tablet, Take 300 mg by mouth 2 (two) times a day, Disp: , Rfl:     methylPREDNISolone 4 MG tablet therapy pack, Use as directed on package, Disp: 21 tablet, Rfl: 0    Prenatal Vit-Fe Fumarate-FA (Prenatal/Iron) 28-0.8 MG TABS, Take 2 tablets by mouth in the morning, Disp: , Rfl:     ibuprofen (MOTRIN) 600 mg tablet, Take 1 tablet (600 mg total) by mouth every 6 (six) hours as needed for mild pain or moderate pain (Patient not taking: Reported on 1/13/2025), Disp: 30 tablet, Rfl: 0    leuprolide (LUPRON DEPOT 1 MONTH KIT) 7.5 mg injection, Inject 7.5 mg into a muscle every 28 days Next injection Oct 1st (Patient not taking: Reported on 1/13/2025), Disp: , Rfl:     methylphenidate (RITALIN) 20 MG tablet, Take 30 mg by mouth daily Monday through Fridays (Patient not taking: Reported on 1/13/2025), Disp: , Rfl:     norethindrone-ethinyl estradiol (MICROGESTIN) 1-20 MG-MCG per tablet, TAKE 1 ACTIVE TABLET BY MOUTH DAILY CONTINUOUSLY (Patient not taking: Reported on 1/13/2025), Disp: , Rfl:     Current Allergies     Allergies as of 01/13/2025 - Reviewed 01/13/2025   Allergen Reaction Noted    Bactrim [sulfamethoxazole-trimethoprim] Delirium 09/26/2024    Mushroom extract complex - food allergy Hives and Throat Swelling 04/24/2017    Rubus fruticosus Hives 04/24/2017    Metformin GI Intolerance 06/20/2024    Penicillins Hives and Rash 03/23/2016    Zithromax [azithromycin] Hives and Rash 03/23/2016            The following portions of the patient's history were reviewed and updated as appropriate: allergies, current medications, past family history, past medical history, past social history, past surgical history and problem list.     Past Medical History:   Diagnosis Date    ADHD     Anemia     Anesthesia     Pt does not want propofol given while  awake- pt reports severe buring sensation    Anxiety     Asthma     Endometriosis     Female infertility     Hearing decreased     right ear    History of COVID-19     Nov 2020, Nov 2022    Hypertension 11/2020    Occured after having covid    Miscarriage     Ovarian cyst     Pneumonia March 2018,July 2018    hospitalized in March for same    POTS (postural orthostatic tachycardia syndrome)     Wears glasses        Past Surgical History:   Procedure Laterality Date    BREAST LUMPECTOMY Right     HYSTEROSCOPY N/A 7/27/2023    Procedure: HYSTEROSCOPY;  Surgeon: Tara Budinetz, DO;  [facility];  Service: Gynecology    HYSTEROSCOPY N/A 6/27/2024    Procedure: HYSTEROSCOPY, POLYPECTOMY, metroplasty;  Surgeon: Tara Budinetz, DO;  [facility];  Service: Gynecology    LAPAROSCOPY N/A 6/27/2024    Procedure: LAPAROSCOPY, fulguration of endometriosis, B/L ovarian cystectomy;  Surgeon: Tara Budinetz, DO;  [facility];  Service: Gynecology    PELVIC LAPAROSCOPY Bilateral 10/28/2021    Procedure: CYSTECTOMY OVARIAN, LAP;  Surgeon: Tara Budinetz, DO;  [facility];  Service: Gynecology    IA HYSTEROSCOPY BX ENDOMETRIUM&/POLYPC W/WO D&C N/A 8/27/2018    Procedure: HYSTEROSCOPY; ENDOMETRIAL BIOPSY;  Surgeon: Tara Budinetz, DO;  [facility];  Service: Gynecology    IA HYSTEROSCOPY BX ENDOMETRIUM&/POLYPC W/WO D&C N/A 10/7/2024    Procedure: HYSTEROSCOPY;EUA Endometrial Biopsy; LYSIS OF ADHESIONS;  Surgeon: Tara Budinetz, DO;  [facility];  Service: Gynecology    IA HYSTEROSCOPY DIAGNOSTIC SEPARATE PROCEDURE N/A 10/28/2021    Procedure: Hysteroscopy, polypectomy, with bx;  Surgeon: Tara Budinetz, DO;  [facility];  Service: Gynecology    IA LAPAROSCOPY W/RMVL ADNEXAL STRUCTURES N/A 8/27/2018    Procedure: LAPAROSCOPY; BILATERAL OVARIAN CYSTECTOMY; FULGERATION OF ENDOMETRIOSIS; POSSIBLE LYSIS OF ADHESIONS; POSSIBLE BILATERAL SALPINGECTOMY;  Surgeon: Tara Budinetz, DO;  " [facility];  Service: Gynecology    MS LAPS FULG/EXC OVARY VISCERA/PERITONEAL SURFACE N/A 10/28/2021    Procedure: FULG ENDOMETRIOSIS;  Surgeon: Tara Budinetz, DO;  [facility];  Service: Gynecology       Family History   Problem Relation Age of Onset    Breast cancer Maternal Aunt     Colon cancer Neg Hx     Ovarian cancer Neg Hx          Medications have been verified.        Objective   /89   Pulse 96   Temp 97.8 °F (36.6 °C) (Tympanic)   Resp 18   Ht 5' 3\" (1.6 m)   Wt 93.5 kg (206 lb 3.2 oz)   LMP 01/06/2025   SpO2 97%   BMI 36.53 kg/m²   Patient's last menstrual period was 01/06/2025.       Physical Exam     Physical Exam  Vitals and nursing note reviewed.   Constitutional:       General: She is not in acute distress.     Appearance: Normal appearance. She is normal weight. She is not ill-appearing, toxic-appearing or diaphoretic.   HENT:      Head: Normocephalic and atraumatic.      Right Ear: External ear normal.      Left Ear: External ear normal.      Nose: Nose normal.      Mouth/Throat:      Mouth: Mucous membranes are moist.   Eyes:      Conjunctiva/sclera: Conjunctivae normal.   Cardiovascular:      Rate and Rhythm: Normal rate.      Pulses: Normal pulses.   Pulmonary:      Effort: Pulmonary effort is normal.      Breath sounds: Normal breath sounds.   Musculoskeletal:         General: Swelling and tenderness (over radial aspect of right wrist with forearm tenderness as well, FInkelstein test +) present. No deformity or signs of injury. Normal range of motion.      Cervical back: Normal range of motion.   Skin:     General: Skin is warm and dry.      Capillary Refill: Capillary refill takes less than 2 seconds.   Neurological:      General: No focal deficit present.      Mental Status: She is alert. Mental status is at baseline.   Psychiatric:         Mood and Affect: Mood normal.         Behavior: Behavior normal.                     "

## 2025-01-17 RX ORDER — ESTRADIOL 2 MG/1
TABLET ORAL
COMMUNITY
Start: 2024-12-02

## 2025-01-17 RX ORDER — FOLLITROPIN 900 [IU]/1.5ML
INJECTION, SOLUTION SUBCUTANEOUS
COMMUNITY
Start: 2024-11-15

## 2025-01-17 RX ORDER — ALBUTEROL SULFATE 90 UG/1
2 INHALANT RESPIRATORY (INHALATION) EVERY 6 HOURS PRN
COMMUNITY

## 2025-01-17 RX ORDER — DOXYCYCLINE HYCLATE 100 MG
1 TABLET ORAL 2 TIMES DAILY
COMMUNITY
Start: 2024-10-22

## 2025-01-17 RX ORDER — PREDNISONE 10 MG/1
1 TABLET ORAL DAILY
COMMUNITY
Start: 2024-10-24

## 2025-01-17 RX ORDER — LEVOTHYROXINE SODIUM 75 UG/1
75 TABLET ORAL DAILY
COMMUNITY
Start: 2024-12-24

## 2025-01-17 RX ORDER — PROGESTERONE 200 MG/1
CAPSULE ORAL
COMMUNITY
Start: 2024-10-22

## 2025-01-17 RX ORDER — LETROZOLE 2.5 MG/1
2 TABLET, FILM COATED ORAL DAILY
COMMUNITY
Start: 2024-12-23

## 2025-01-28 ENCOUNTER — NURSE TRIAGE (OUTPATIENT)
Age: 35
End: 2025-01-28

## 2025-01-28 NOTE — TELEPHONE ENCOUNTER
"Pt calling in stating that she has a red \"rash\" that isnt improving that is on her right breast. Pt says she has a family history of breast cancer, and pt is concerned. Pts rash is underneath the right breast on the left side.  Pt had a lump removed on the same breast years ago.  Pt denies fever, breast pain, nipple discharge, redness       Pt would like to be seen, pt is scheduled with Dr Schmitt for tomorrow.           Reason for Disposition   Patient wants to be seen    Answer Assessment - Initial Assessment Questions  1. SYMPTOM: \"What's the main symptom you're concerned about?\"  (e.g., lump, nipple discharge, pain, rash )      Rash on right breast   2. LOCATION: \"Where is the rash located?\"      Underneath the right breast on the left side.   3. ONSET: \"When did symptoms  start?\"      A couple days ago  4. PRIOR HISTORY: \"Do you have any history of prior problems with your breasts?\" (e.g., breast cancer, breast implant, fibrocystic breast disease)      Pt had a lump removed on the same breast years ago.   5. CAUSE: \"What do you think is causing this symptom?\"      Pt unsure   6. OTHER SYMPTOMS: \"Do you have any other symptoms?\" (e.g., fever, breast pain, nipple discharge, redness or rash)      Pt denies fever, breast pain, nipple discharge, redness   7. PREGNANCY-BREASTFEEDING: \"Is there any chance you are pregnant?\" \"When was your last menstrual period?\" \"Are you breastfeeding?\"      Pt denies pregnancy, 1/20/25.    Protocols used: Breast Symptoms-Adult-[place]    "

## 2025-01-28 NOTE — PROGRESS NOTES
Assessment/Plan:  Problem List Items Addressed This Visit          Oncology    Family history of breast cancer    Relevant Orders    Mammo diagnostic bilateral w 3d and cad    US breast right limited (diagnostic)    Ambulatory Referral to Oncology Genetics       Other    Breast skin changes - Primary    Relevant Medications    mupirocin (BACTROBAN) 2 % ointment    Other Relevant Orders    Mammo diagnostic bilateral w 3d and cad    US breast right limited (diagnostic)        Discussed skin changes could be resolving folliculitis or small breast skin abscess.  Advised mupirocin or Bactroban ointment to use 3 times a day.  She is concerned about possibility of inflammatory breast cancer as her aunt previously presented with this.  Bilateral diagnostic mammogram was ordered.  Patient with family history of breast cancer and BRCA mutation.  Patient states she had negative BRCA testing through 23 in May but referred her for formal genetic counseling to see if complete panel could be ordered.  We will call patient with test results and coordinate additional testing or referrals if needed.  Discussed possibility of referral to dermatology versus breast specialist depending on testing results and if skin changes resolved.    Subjective   Patient ID: Deneen Bryant is a 34 y.o. female.    Patient is here for a problem visit.    Chief Complaint   Patient presents with    Breast Problem     Pt noticed a spot on her right breast on Saturday, pt states that is causes no discomfort, red in color      She noticed a rash on her right breast, started on Saturday.  No itching. Some redness, does not bother her.  She is not having fevers.  No h/o breast trauma.    Maternal aunt had breast cancer at age 25.  Her aunt had some BRCA mutation.  Patient had [facility] testing and was negative for BRCA.    She has had a mammogram.    H/o lumpectomy in 2011 R side, cyst removed, Dr. Thierno Murillo/[facility]  Mammogram was done in 2016 with  Dr. More    Menstrual History:  OB History          5    Para   0    Term   0       0    AB   5    Living   0         SAB   5    IAB   0    Ectopic   0    Multiple   0    Live Births   0                Menarche age: 15  Patient's last menstrual period was 2025 (exact date).   Sexually active  She is on DEPO Lupron, planning on IVF, working with [facility]        Past Medical History:   Diagnosis Date    ADHD     Anemia     Anesthesia     Pt does not want propofol given while awake- pt reports severe buring sensation    Anxiety     Asthma     Endometriosis     Female infertility     Hearing decreased     right ear    History of COVID-19     2020, 2022    Hypertension 2020    Occured after having covid    Miscarriage     Ovarian cyst     Pneumonia 2018,2018    hospitalized in March for same    POTS (postural orthostatic tachycardia syndrome)     Wears glasses        Past Surgical History:   Procedure Laterality Date    BREAST LUMPECTOMY Right     HYSTEROSCOPY N/A 2023    Procedure: HYSTEROSCOPY;  Surgeon: Tara Budinetz, DO;  [facility];  Service: Gynecology    HYSTEROSCOPY N/A 2024    Procedure: HYSTEROSCOPY, POLYPECTOMY, metroplasty;  Surgeon: Tara Budinetz, DO;  [facility];  Service: Gynecology    LAPAROSCOPY N/A 2024    Procedure: LAPAROSCOPY, fulguration of endometriosis, B/L ovarian cystectomy;  Surgeon: Tara Budinetz, DO;  [facility];  Service: Gynecology    PELVIC LAPAROSCOPY Bilateral 10/28/2021    Procedure: CYSTECTOMY OVARIAN, LAP;  Surgeon: Tara Budinetz, DO;  [facility];  Service: Gynecology    PA HYSTEROSCOPY BX ENDOMETRIUM&/POLYPC W/WO D&C N/A 2018    Procedure: HYSTEROSCOPY; ENDOMETRIAL BIOPSY;  Surgeon: Tara Budinetz, DO;  [facility];  Service: Gynecology    PA HYSTEROSCOPY BX ENDOMETRIUM&/POLYPC W/WO D&C N/A 10/7/2024    Procedure: HYSTEROSCOPY;EUA Endometrial Biopsy; LYSIS OF  ADHESIONS;  Surgeon: Tara Budinetz, DO;  [facility];  Service: Gynecology    IA HYSTEROSCOPY DIAGNOSTIC SEPARATE PROCEDURE N/A 10/28/2021    Procedure: Hysteroscopy, polypectomy, with bx;  Surgeon: Tara Budinetz, DO;  [facility];  Service: Gynecology    IA LAPAROSCOPY W/RMVL ADNEXAL STRUCTURES N/A 8/27/2018    Procedure: LAPAROSCOPY; BILATERAL OVARIAN CYSTECTOMY; FULGERATION OF ENDOMETRIOSIS; POSSIBLE LYSIS OF ADHESIONS; POSSIBLE BILATERAL SALPINGECTOMY;  Surgeon: Tara Budinetz, DO;  [facility];  Service: Gynecology    IA LAPS FULG/EXC OVARY VISCERA/PERITONEAL SURFACE N/A 10/28/2021    Procedure: FULG ENDOMETRIOSIS;  Surgeon: Tara Budinetz, DO;  [facility];  Service: Gynecology       Social History     Tobacco Use    Smoking status: Never    Smokeless tobacco: Never   Vaping Use    Vaping status: Never Used   Substance Use Topics    Alcohol use: Not Currently     Comment: Occassionally    Drug use: No        Allergies   Allergen Reactions    Bactrim [Sulfamethoxazole-Trimethoprim] Delirium    Mushroom Extract Complex - Food Allergy Hives and Throat Swelling    Rubus Fruticosus Hives     blackberries    Metformin GI Intolerance    Penicillins Hives and Rash    Zithromax [Azithromycin] Hives and Rash         Current Outpatient Medications:     acyclovir (ZOVIRAX) 200 mg capsule, Take 400 mg by mouth 3 (three) times a day, Disp: , Rfl:     albuterol (PROVENTIL HFA,VENTOLIN HFA) 90 mcg/act inhaler, Inhale 2 puffs every 6 (six) hours as needed for wheezing, Disp: , Rfl:     buPROPion (WELLBUTRIN XL) 150 mg 24 hr tablet, Take 1 tablet by mouth every morning, Disp: , Rfl:     labetalol (NORMODYNE) 300 mg tablet, Take 300 mg by mouth 2 (two) times a day, Disp: , Rfl:     levothyroxine 75 mcg tablet, Take 75 mcg by mouth daily, Disp: , Rfl:     mupirocin (BACTROBAN) 2 % ointment, Apply topically 3 (three) times a day, Disp: 30 g, Rfl: 1    Prenatal Vit-Fe Fumarate-FA  "(Prenatal/Iron) 28-0.8 MG TABS, Take 2 tablets by mouth in the morning, Disp: , Rfl:     doxycycline hyclate (VIBRA-TABS) 100 mg tablet, Take 1 tablet by mouth 2 (two) times a day (Patient not taking: Reported on 1/29/2025), Disp: , Rfl:     estradiol (ESTRACE) 2 MG tablet, INSERT 1 TABLET VAGINALLY ONCE DAILY IN THE EVENING (Patient not taking: Reported on 1/29/2025), Disp: , Rfl:     Gonal-f RFF Rediject 900 UNIT/1.5ML SOPN, , Disp: , Rfl:     letrozole (FEMARA) 2.5 mg tablet, Take 2 tablets by mouth in the morning (Patient not taking: Reported on 1/29/2025), Disp: , Rfl:     methylPREDNISolone 4 MG tablet therapy pack, Use as directed on package (Patient not taking: Reported on 1/29/2025), Disp: 21 tablet, Rfl: 0    predniSONE 10 mg tablet, Take 1 tablet by mouth in the morning (Patient not taking: Reported on 1/29/2025), Disp: , Rfl:     Progesterone 200 MG CAPS, TAKE 1 CAPSULE BY MOUTH TWICE DAILY BY VAGINAL ROUTE (Patient not taking: Reported on 1/29/2025), Disp: , Rfl:       Pertinent laboratory testing, imaging studies and prior external records reviewed    Review of Systems   Constitutional: Negative.    HENT: Negative.     Eyes: Negative.    Respiratory: Negative.     Cardiovascular: Negative.    Gastrointestinal: Negative.    Endocrine: Negative.    Genitourinary:         As noted in HPI   Musculoskeletal: Negative.    Skin: Negative.    Allergic/Immunologic: Negative.    Neurological: Negative.    Hematological: Negative.    Psychiatric/Behavioral: Negative.           /80 (BP Location: Right arm, Patient Position: Sitting, Cuff Size: Adult)   Pulse 80   Ht 5' 3\" (1.6 m)   LMP 01/20/2025 (Exact Date)   SpO2 97%   BMI 36.53 kg/m²       Physical Exam  Constitutional:       Appearance: Normal appearance.   Genitourinary:      Genitourinary Comments: Obtained verbal consent to obtain photo documentation/clinical IMAGE on Spyra sang for the purpose of medical records     Breasts:     Right: Skin " change present. No swelling, bleeding, inverted nipple, mass, nipple discharge or tenderness.      Left: Normal. No bleeding, inverted nipple, mass, nipple discharge, skin change or tenderness.   HENT:      Head: Normocephalic.      Nose: Nose normal.   Pulmonary:      Effort: Pulmonary effort is normal.   Chest:      Chest wall: No mass, lacerations, deformity, swelling, tenderness, crepitus or edema.       Abdominal:      General: There is no distension.      Palpations: Abdomen is soft.      Tenderness: There is no abdominal tenderness.   Lymphadenopathy:      Upper Body:      Right upper body: No supraclavicular or axillary adenopathy.      Left upper body: No supraclavicular or axillary adenopathy.   Neurological:      General: No focal deficit present.      Mental Status: She is alert and oriented to person, place, and time.   Skin:     General: Skin is warm and dry.   Psychiatric:         Mood and Affect: Mood normal.         Behavior: Behavior normal.         Thought Content: Thought content normal.   Vitals and nursing note reviewed.             Future Appointments   Date Time Provider Department [place]   10/23/2025  8:30 AM Brenda Story MD [facility]

## 2025-01-29 ENCOUNTER — OFFICE VISIT (OUTPATIENT)
Dept: OBGYN CLINIC | Facility: CLINIC | Age: 35
End: 2025-01-29
Payer: COMMERCIAL

## 2025-01-29 VITALS
SYSTOLIC BLOOD PRESSURE: 122 MMHG | DIASTOLIC BLOOD PRESSURE: 80 MMHG | HEIGHT: 63 IN | HEART RATE: 80 BPM | BODY MASS INDEX: 36.53 KG/M2 | OXYGEN SATURATION: 97 %

## 2025-01-29 DIAGNOSIS — Z80.3 FAMILY HISTORY OF BREAST CANCER: ICD-10-CM

## 2025-01-29 DIAGNOSIS — R23.4 BREAST SKIN CHANGES: Primary | ICD-10-CM

## 2025-01-29 PROCEDURE — 99213 OFFICE O/P EST LOW 20 MIN: CPT | Performed by: OBSTETRICS & GYNECOLOGY

## 2025-01-29 RX ORDER — MUPIROCIN 20 MG/G
OINTMENT TOPICAL 3 TIMES DAILY
Qty: 30 G | Refills: 1 | Status: SHIPPED | OUTPATIENT
Start: 2025-01-29

## 2025-02-21 ENCOUNTER — ANESTHESIA EVENT (OUTPATIENT)
Dept: PERIOP | Facility: AMBULARY SURGERY CENTER | Age: 35
End: 2025-02-21
Payer: COMMERCIAL

## 2025-02-21 NOTE — PRE-PROCEDURE INSTRUCTIONS
Pre-Surgery Instructions:   Medication Instructions    acyclovir (ZOVIRAX) 200 mg capsule Uses PRN- OK to take day of surgery    buPROPion (WELLBUTRIN XL) 150 mg 24 hr tablet Take day of surgery.    labetalol (NORMODYNE) 300 mg tablet Take day of surgery.    levothyroxine 75 mcg tablet Take day of surgery.    Prenatal Vit-Fe Fumarate-FA (Prenatal/Iron) 28-0.8 MG TABS Stop taking 7 days prior to surgery.    Medication instructions for day of surgery reviewed. Please take all instructed medications with only a sip of water.       You will receive a call one business day prior to surgery with an arrival time and hospital directions. If your surgery is scheduled on a Monday, the hospital will be calling you on the Friday prior to your surgery. If you have not heard from anyone by 8pm, please call the hospital supervisor through the hospital  at [phone]. ([place] [phone] or [place] [phone]).    Do not eat or drink anything after midnight the night before your surgery, including candy, mints, lifesavers, or chewing gum. Do not drink alcohol 24hrs before your surgery. Try not to smoke at least 24hrs before your surgery.       Follow the pre surgery showering instructions as listed in the “My Surgical Experience Booklet” or otherwise provided by your surgeon's office. Do not use a blade to shave the surgical area 1 week before surgery. It is okay to use a clean electric clippers up to 24 hours before surgery. Do not apply any lotions, creams, including makeup, cologne, deodorant, or perfumes after showering on the day of your surgery. Do not use dry shampoo, hair spray, hair gel, or any type of hair products.     No contact lenses, eye make-up, or artificial eyelashes. Remove nail polish, including gel polish, and any artificial, gel, or acrylic nails if possible. Remove all jewelry including rings and body piercing jewelry.     Wear causal clothing that is easy to take on and off. Consider your  type of surgery.    Keep any valuables, jewelry, piercings at home. Please bring any specially ordered equipment (sling, braces) if indicated.    Arrange for a responsible person to drive you to and from the hospital on the day of your surgery. Please confirm the visitor policy for the day of your procedure when you receive your phone call with an arrival time.     Call the surgeon's office with any new illnesses, exposures, or additional questions prior to surgery.    Please reference your “My Surgical Experience Booklet” for additional information to prepare for your upcoming surgery.

## 2025-03-02 NOTE — H&P
H&P Exam  Deneen Bryant 35 y.o. female MRN: [id number]  Unit/Bed#:  Encounter: [id number]      HPI:  Deneen Bryant is a 35 y.o.  female with known history of AUB, endometriosis, and dysmenorrhea who presents for scheduled EUA, hysteroscopy. She reports being in her usual state of health.      OB History    Para Term  AB Living   5 0 0 0 5 0   SAB IAB Ectopic Multiple Live Births   5 0 0 0 0      # Outcome Date GA Lbr Zeke/2nd Weight Sex Type Anes PTL Lv   5 SAB      SAB      4 SAB      SAB      3 SAB      SAB      2 SAB      SAB      1 SAB      SAB          Review of Systems   All other systems reviewed and are negative.      Historical Information   Past Medical History:   Diagnosis Date    ADHD     Anemia     Anesthesia     Pt does not want propofol given while awake- pt reports severe buring sensation    Anxiety     Asthma     Endometriosis     Female infertility     Hearing decreased     right ear    History of COVID-19     2020, 2022    Hypertension 2020    Occured after having covid    Miscarriage     Ovarian cyst     Pneumonia 2018,2018    hospitalized in March for same    POTS (postural orthostatic tachycardia syndrome)     Tachycardia     Wears glasses      Past Surgical History:   Procedure Laterality Date    BREAST LUMPECTOMY Right     HYSTEROSCOPY N/A 2023    Procedure: HYSTEROSCOPY;  Surgeon: Tara Budinetz, DO;  Location: [facility];  Service: Gynecology    HYSTEROSCOPY N/A 2024    Procedure: HYSTEROSCOPY, POLYPECTOMY, metroplasty;  Surgeon: Tara Budinetz, DO;  Location[facility];  Service: Gynecology    LAPAROSCOPY N/A 2024    Procedure: LAPAROSCOPY, fulguration of endometriosis, B/L ovarian cystectomy;  Surgeon: Tara Budinetz, DO;  Location: [facility];  Service: Gynecology    PELVIC LAPAROSCOPY Bilateral 10/28/2021    Procedure: CYSTECTOMY OVARIAN, LAP;  Surgeon: Tara Budinetz, DO;  Location: [facility];  Service: Gynecology    OR  "HYSTEROSCOPY BX ENDOMETRIUM&/POLYPC W/WO D&C N/A 8/27/2018    Procedure: HYSTEROSCOPY; ENDOMETRIAL BIOPSY;  Surgeon: Tara Budinetz, DO;  [facility]: [facility];  Service: Gynecology    FL HYSTEROSCOPY BX ENDOMETRIUM&/POLYPC W/WO D&C N/A 10/7/2024    Procedure: HYSTEROSCOPY;EUA Endometrial Biopsy; LYSIS OF ADHESIONS;  Surgeon: Tara Budinetz, DO;  [facility]: [facility];  Service: Gynecology    FL HYSTEROSCOPY DIAGNOSTIC SEPARATE PROCEDURE N/A 10/28/2021    Procedure: Hysteroscopy, polypectomy, with bx;  Surgeon: Tara Budinetz, DO;  [facility];  Service: Gynecology    FL LAPAROSCOPY W/RMVL ADNEXAL STRUCTURES N/A 8/27/2018    Procedure: LAPAROSCOPY; BILATERAL OVARIAN CYSTECTOMY; FULGERATION OF ENDOMETRIOSIS; POSSIBLE LYSIS OF ADHESIONS; POSSIBLE BILATERAL SALPINGECTOMY;  Surgeon: Tara Budinetz, DO;  [facility]: [facility];  Service: Gynecology    FL LAPS FULG/EXC OVARY VISCERA/PERITONEAL SURFACE N/A 10/28/2021    Procedure: FULG ENDOMETRIOSIS;  Surgeon: Tara Budinetz, DO;  Location[facility];  Service: Gynecology     Social History   Social History     Substance and Sexual Activity   Alcohol Use Not Currently    Comment: Occassionally     Social History     Substance and Sexual Activity   Drug Use No     Social History     Tobacco Use   Smoking Status Never   Smokeless Tobacco Never     Meds/Allergies    No medications prior to admission.     Allergies   Allergen Reactions    Bactrim [Sulfamethoxazole-Trimethoprim] Delirium    Mushroom Extract Complex - Food Allergy Hives and Throat Swelling    Rubus Fruticosus Hives     blackberries    Metformin GI Intolerance    Penicillins Hives and Rash    Zithromax [Azithromycin] Hives and Rash       OBJECTIVE:    Vitals: Height 5' 3\" (1.6 m), weight 93.4 kg (206 lb), not currently breastfeeding.Body mass index is 36.49 kg/m².    Physical Exam  Constitutional:       Appearance: Normal appearance.   Cardiovascular:      Rate and Rhythm: Normal rate.   Pulmonary:     "  Effort: Pulmonary effort is normal.   Genitourinary:     Comments: Deferred to OR  Musculoskeletal:         General: Normal range of motion.   Skin:     General: Skin is warm.   Neurological:      Mental Status: She is alert.       Assessment & Plan     ASSESSMENT:  34 yo female who presents for scheduled procedure: EUA, hysteroscopy.     PLAN:    - To OR as planned  - Antibiotics not indicated    Nikole Ponce MD  3/2/2025  12:46 PM

## 2025-03-03 ENCOUNTER — HOSPITAL ENCOUNTER (OUTPATIENT)
Facility: AMBULARY SURGERY CENTER | Age: 35
Setting detail: OUTPATIENT SURGERY
Discharge: HOME | End: 2025-03-03
Attending: OBSTETRICS & GYNECOLOGY | Admitting: OBSTETRICS & GYNECOLOGY
Payer: COMMERCIAL

## 2025-03-03 ENCOUNTER — ANESTHESIA (OUTPATIENT)
Dept: PERIOP | Facility: AMBULARY SURGERY CENTER | Age: 35
End: 2025-03-03
Payer: COMMERCIAL

## 2025-03-03 VITALS
TEMPERATURE: 97.3 F | RESPIRATION RATE: 15 BRPM | DIASTOLIC BLOOD PRESSURE: 59 MMHG | HEART RATE: 84 BPM | HEIGHT: 63 IN | SYSTOLIC BLOOD PRESSURE: 121 MMHG | BODY MASS INDEX: 35.79 KG/M2 | OXYGEN SATURATION: 95 % | WEIGHT: 202 LBS

## 2025-03-03 DIAGNOSIS — N80.9 ENDOMETRIOSIS, UNSPECIFIED: ICD-10-CM

## 2025-03-03 DIAGNOSIS — N96 RECURRENT PREGNANCY LOSS: ICD-10-CM

## 2025-03-03 PROBLEM — G47.30 SLEEP APNEA: Status: ACTIVE | Noted: 2025-03-03

## 2025-03-03 LAB
EXT PREGNANCY TEST URINE: NEGATIVE
EXT. CONTROL: NORMAL

## 2025-03-03 PROCEDURE — 88305 TISSUE EXAM BY PATHOLOGIST: CPT | Performed by: PATHOLOGY

## 2025-03-03 PROCEDURE — 88341 IMHCHEM/IMCYTCHM EA ADD ANTB: CPT | Performed by: PATHOLOGY

## 2025-03-03 PROCEDURE — 88342 IMHCHEM/IMCYTCHM 1ST ANTB: CPT | Performed by: PATHOLOGY

## 2025-03-03 PROCEDURE — 81025 URINE PREGNANCY TEST: CPT | Performed by: OBSTETRICS & GYNECOLOGY

## 2025-03-03 RX ORDER — FENTANYL CITRATE 50 UG/ML
INJECTION, SOLUTION INTRAMUSCULAR; INTRAVENOUS AS NEEDED
Status: DISCONTINUED | OUTPATIENT
Start: 2025-03-03 | End: 2025-03-03

## 2025-03-03 RX ORDER — ONDANSETRON 2 MG/ML
INJECTION INTRAMUSCULAR; INTRAVENOUS AS NEEDED
Status: DISCONTINUED | OUTPATIENT
Start: 2025-03-03 | End: 2025-03-03

## 2025-03-03 RX ORDER — IBUPROFEN 600 MG/1
600 TABLET, FILM COATED ORAL EVERY 6 HOURS PRN
Status: CANCELLED | OUTPATIENT
Start: 2025-03-03

## 2025-03-03 RX ORDER — ACETAMINOPHEN 325 MG/1
975 TABLET ORAL EVERY 6 HOURS PRN
Status: CANCELLED | OUTPATIENT
Start: 2025-03-03

## 2025-03-03 RX ORDER — MAGNESIUM HYDROXIDE 1200 MG/15ML
LIQUID ORAL AS NEEDED
Status: DISCONTINUED | OUTPATIENT
Start: 2025-03-03 | End: 2025-03-03 | Stop reason: HOSPADM

## 2025-03-03 RX ORDER — HYDROMORPHONE HCL/PF 1 MG/ML
0.25 SYRINGE (ML) INJECTION
Status: DISCONTINUED | OUTPATIENT
Start: 2025-03-03 | End: 2025-03-03 | Stop reason: HOSPADM

## 2025-03-03 RX ORDER — ONDANSETRON 2 MG/ML
4 INJECTION INTRAMUSCULAR; INTRAVENOUS EVERY 6 HOURS PRN
Status: CANCELLED | OUTPATIENT
Start: 2025-03-03

## 2025-03-03 RX ORDER — FENTANYL CITRATE/PF 50 MCG/ML
25 SYRINGE (ML) INJECTION
Status: DISCONTINUED | OUTPATIENT
Start: 2025-03-03 | End: 2025-03-03 | Stop reason: HOSPADM

## 2025-03-03 RX ORDER — ONDANSETRON 2 MG/ML
4 INJECTION INTRAMUSCULAR; INTRAVENOUS ONCE AS NEEDED
Status: DISCONTINUED | OUTPATIENT
Start: 2025-03-03 | End: 2025-03-03 | Stop reason: HOSPADM

## 2025-03-03 RX ORDER — SODIUM CHLORIDE, SODIUM LACTATE, POTASSIUM CHLORIDE, CALCIUM CHLORIDE 600; 310; 30; 20 MG/100ML; MG/100ML; MG/100ML; MG/100ML
INJECTION, SOLUTION INTRAVENOUS CONTINUOUS PRN
Status: DISCONTINUED | OUTPATIENT
Start: 2025-03-03 | End: 2025-03-03

## 2025-03-03 RX ORDER — LIDOCAINE HYDROCHLORIDE 10 MG/ML
INJECTION, SOLUTION EPIDURAL; INFILTRATION; INTRACAUDAL; PERINEURAL AS NEEDED
Status: DISCONTINUED | OUTPATIENT
Start: 2025-03-03 | End: 2025-03-03

## 2025-03-03 RX ORDER — DEXAMETHASONE SODIUM PHOSPHATE 10 MG/ML
INJECTION, SOLUTION INTRAMUSCULAR; INTRAVENOUS AS NEEDED
Status: DISCONTINUED | OUTPATIENT
Start: 2025-03-03 | End: 2025-03-03

## 2025-03-03 RX ORDER — PROPOFOL 10 MG/ML
INJECTION, EMULSION INTRAVENOUS AS NEEDED
Status: DISCONTINUED | OUTPATIENT
Start: 2025-03-03 | End: 2025-03-03

## 2025-03-03 RX ORDER — MIDAZOLAM HYDROCHLORIDE 2 MG/2ML
INJECTION, SOLUTION INTRAMUSCULAR; INTRAVENOUS AS NEEDED
Status: DISCONTINUED | OUTPATIENT
Start: 2025-03-03 | End: 2025-03-03

## 2025-03-03 RX ORDER — PROPOFOL 10 MG/ML
INJECTION, EMULSION INTRAVENOUS CONTINUOUS PRN
Status: DISCONTINUED | OUTPATIENT
Start: 2025-03-03 | End: 2025-03-03

## 2025-03-03 RX ADMIN — LIDOCAINE HYDROCHLORIDE 50 MG: 10 INJECTION, SOLUTION EPIDURAL; INFILTRATION; INTRACAUDAL at 11:51

## 2025-03-03 RX ADMIN — FENTANYL CITRATE 25 MCG: 50 INJECTION INTRAMUSCULAR; INTRAVENOUS at 12:30

## 2025-03-03 RX ADMIN — DEXAMETHASONE SODIUM PHOSPHATE 10 MG: 10 INJECTION INTRAMUSCULAR; INTRAVENOUS at 11:52

## 2025-03-03 RX ADMIN — PROPOFOL 140 MG: 10 INJECTION, EMULSION INTRAVENOUS at 11:51

## 2025-03-03 RX ADMIN — PROPOFOL 150 MCG/KG/MIN: 10 INJECTION, EMULSION INTRAVENOUS at 11:52

## 2025-03-03 RX ADMIN — FENTANYL CITRATE 50 MCG: 50 INJECTION INTRAMUSCULAR; INTRAVENOUS at 12:08

## 2025-03-03 RX ADMIN — MIDAZOLAM 2 MG: 1 INJECTION INTRAMUSCULAR; INTRAVENOUS at 11:47

## 2025-03-03 RX ADMIN — DEXMEDETOMIDINE 8 MCG: 100 INJECTION, SOLUTION INTRAVENOUS at 11:47

## 2025-03-03 RX ADMIN — DEXMEDETOMIDINE 8 MCG: 100 INJECTION, SOLUTION INTRAVENOUS at 11:51

## 2025-03-03 RX ADMIN — FENTANYL CITRATE 25 MCG: 50 INJECTION INTRAMUSCULAR; INTRAVENOUS at 11:57

## 2025-03-03 RX ADMIN — ONDANSETRON 4 MG: 2 INJECTION INTRAMUSCULAR; INTRAVENOUS at 11:47

## 2025-03-03 RX ADMIN — FENTANYL CITRATE 25 MCG: 50 INJECTION INTRAMUSCULAR; INTRAVENOUS at 12:02

## 2025-03-03 RX ADMIN — SODIUM CHLORIDE, SODIUM LACTATE, POTASSIUM CHLORIDE, AND CALCIUM CHLORIDE: .6; .31; .03; .02 INJECTION, SOLUTION INTRAVENOUS at 09:04

## 2025-03-03 NOTE — ANESTHESIA PREPROCEDURE EVALUATION
Procedure:  HYSTEROSCOPY (Uterus)    Relevant Problems   ANESTHESIA   (+) History of unintended awareness under general anesthesia      HEMATOLOGY   (+) Anemia   (+) Iron deficiency anemia      PULMONARY   (+) Exercise-induced asthma   (+) Sleep apnea      Meds  Labetalol today  Albuterol 1x/month    NPO?:  940pm food  1140 drink  Physical Exam    Airway    Mallampati score: III         Dental   No notable dental hx     Cardiovascular  Cardiovascular exam normal    Pulmonary  Pulmonary exam normal     Other Findings      Anesthesia Plan  ASA Score- 2     Anesthesia Type- general with ASA Monitors.         Additional Monitors:     Airway Plan: ETT and LMA.           Plan Factors-Exercise tolerance (METS): >4 METS.    Chart reviewed. EKG reviewed.   Patient summary reviewed.                  Induction- intravenous.    Postoperative Plan- Plan for postoperative opioid use. Planned trial extubation    Perioperative Resuscitation Plan - Level 1 - Full Code.   Patient would like to be fully resuscitated in the event of an emergency.    Informed Consent- Anesthetic plan and risks discussed with patient.  I personally reviewed this patient with the CRNA. Discussed and agreed on the Anesthesia Plan with the CRNA..      NPO Status:  Vitals Value Taken Time   Date of last liquid 03/02/25 03/03/25 0858   Time of last liquid 2343 03/03/25 0858   Date of last solid 03/02/25 03/03/25 0858   Time of last solid 2140 03/03/25 0858

## 2025-03-03 NOTE — ANESTHESIA POSTPROCEDURE EVALUATION
Post-Op Assessment Note    CV Status:  Stable  Pain Score: 0    Pain management: adequate       Mental Status:  Sleepy and arousable   PONV Controlled:  None   Airway Patency:  Patent     Post Op Vitals Reviewed: Yes    No anethesia notable event occurred.    Staff: CRNA           Last Filed PACU Vitals:  Vitals Value Taken Time   Temp 98.6    Pulse 80    /80    Resp 16    SpO2 97

## 2025-03-03 NOTE — OP NOTE
OPERATIVE REPORT  PATIENT NAME: Deneen Bryant    :  1990  MRN: [id number]  Pt Location: [facility] ROOM 06    SURGERY DATE: 3/3/2025    Surgeons and Role:     * Tara Budinetz, DO - Primary     * Nikole Ponce MD - Assisting    Preop Diagnosis:  Abnormal uterine bleeding   Dysmenorrhea  Endometriosis     Post-Op Diagnosis Codes:  Abnormal uterine bleeding   Dysmenorrhea  Endometriosis       Procedure(s):  HYSTEROSCOPY. EUA. BIOPSY    Specimen(s):  ID Type Source Tests Collected by Time Destination   1 : ENDOMETRIAL BIOSPY FOR  STAINING Tissue Endometrium TISSUE EXAM Aliya BudinevarshaDO 3/3/2025 1208        Estimated Blood Loss:   Minimal    IVF: 200 ml    Urine:  250 ml    Hysteroscopic Fluid deficit: 165 ml      Drains:  * No LDAs found *    Anesthesia Type:   Choice    Operative Indications:    Abnormal uterine bleeding and dysmenorrhea despite medical management    Operative Findings:  Exam under anesthesia the uterus appeared slightly large and globular consistent with adenomyosis findings.    The uterus sounded to 8 cm anteverted using an endometrial biopsy Pipelle.  Through the hysteroscope both ostia were visualized.  Uterine cavity was normal.  Biopsies were taken of the lining given her abnormal uterine bleeding.      Complications:   None    Procedure and Technique:  The patient was identified in the preoperative holding area and taken to the operative suite where she was placed in supine position.  She then underwent anesthesia  and was placed in the dorsal lithotomy position and prepped and draped in normal sterile fashion.  A time-out was held according to protocol and it was seemed we could begin the procedure.  The bladder was emptied using a straight catheter.     The cervix was visualized using a Alegre retractor posteriorly and a Christiano retractor anteriorly.  The anterior lip of the cervix grasped with single-tooth tenaculum.  The uterus sounded to 8 centimeters anteverted using an  endometrial biopsy Pipelle.  The cervix was progressively dilated to 21 Using  dilators.  The Myosure hysteroscope was then inserted and careful survey of the cavity revealed the findings as noted above.  Both ostia were well visualized and the cavity was normal.  Biopsies were taken of the lining and tissue sent to pathology for further evaluation given the patient's continued heavy abnormal uterine bleeding despite medical management    This concluded the procedure and all instruments removed the patient's vagina. Hemostasis was observed.  The patient was then awakened from anesthesia and taken to the recovery for which she was noted to be in stable condition.  It should be noted at the end of procedure all sponge lap needle instrument counts were correct x2 per the RN.     I was present for the entire procedure.    Patient Disposition:  PACU              SIGNATURE: Tara Budinetz, DO  DATE: March 3, 2025  TIME: 12:10 PM

## 2025-03-03 NOTE — DISCHARGE INSTR - AVS FIRST PAGE
Gynecology Discharge Instructions  1. Nothing in the vagina for 6-8 weeks and until cleared by Dr. Budinetz.  2. Call the office for fever greater than 100.4, heavy vaginal bleeding or increasing pain.  3. Activity as tolerated

## 2025-03-03 NOTE — ANESTHESIA POSTPROCEDURE EVALUATION
Post-Op Assessment Note    CV Status:  Stable    Pain management: adequate       Mental Status:  Alert and awake   Hydration Status:  Euvolemic   PONV Controlled:  Controlled   Airway Patency:  Patent     Post Op Vitals Reviewed: Yes    No anethesia notable event occurred.            Last Filed PACU Vitals:  Vitals Value Taken Time   Temp 98.7 °F (37.1 °C) 03/03/25 1245   Pulse 75 03/03/25 1247   /65 03/03/25 1245   Resp 10 03/03/25 1247   SpO2 95 % 03/03/25 1247   Vitals shown include unfiled device data.    Modified Jesika:     Vitals Value Taken Time   Activity 2 03/03/25 1245   Respiration 2 03/03/25 1245   Circulation 2 03/03/25 1245   Consciousness 1 03/03/25 1245   Oxygen Saturation 2 03/03/25 1245     Modified Jesika Score: 9

## 2025-03-06 PROCEDURE — 88342 IMHCHEM/IMCYTCHM 1ST ANTB: CPT | Performed by: PATHOLOGY

## 2025-03-06 PROCEDURE — 88341 IMHCHEM/IMCYTCHM EA ADD ANTB: CPT | Performed by: PATHOLOGY

## 2025-03-06 PROCEDURE — 88305 TISSUE EXAM BY PATHOLOGIST: CPT | Performed by: PATHOLOGY

## (undated) DEVICE — ENDOPATH XCEL UNIVERSAL TROCAR STABLILITY SLEEVES: Brand: ENDOPATH XCEL

## (undated) DEVICE — GLOVE INDICATOR PI UNDERGLOVE SZ 7 BLUE

## (undated) DEVICE — DRAPE LAPAROTOMY W/POUCHES

## (undated) DEVICE — 3000CC GUARDIAN II: Brand: GUARDIAN

## (undated) DEVICE — STRL ALLENTOWN HYSTEROSCOPY PK: Brand: CARDINAL HEALTH

## (undated) DEVICE — SPONGE LAP 18 X 18 IN

## (undated) DEVICE — DEVICE MYOSURE TISSUE REMOVAL HYSTEROSCOPIC 10-401FC

## (undated) DEVICE — PACK FLUENT DISP

## (undated) DEVICE — UTERINE MANIPULATOR 4.5MM STRL

## (undated) DEVICE — ELECTRODE LAP SPATULA CRV E-Z CLEAN 33CM -0018C

## (undated) DEVICE — IV EXTENSION TUBING 33 IN

## (undated) DEVICE — 2000CC GUARDIAN II: Brand: GUARDIAN

## (undated) DEVICE — PREMIUM DRY TRAY LF: Brand: MEDLINE INDUSTRIES, INC.

## (undated) DEVICE — NEEDLE 25G X 1 1/2

## (undated) DEVICE — ENDOMETRIAL BX PIPELLE

## (undated) DEVICE — IRRIG ENDO FLO TUBING

## (undated) DEVICE — ENDOPATH XCEL BLADELESS TROCARS WITH STABILITY SLEEVES: Brand: ENDOPATH XCEL

## (undated) DEVICE — SCD SEQUENTIAL COMPRESSION COMFORT SLEEVE MEDIUM KNEE LENGTH: Brand: KENDALL SCD

## (undated) DEVICE — SUT VICRYL 4-0 PS-2 27 IN J426H

## (undated) DEVICE — PLASTIC ADHESIVE BANDAGE: Brand: CURITY

## (undated) DEVICE — PAD SANITARY

## (undated) DEVICE — INTENDED FOR TISSUE SEPARATION, AND OTHER PROCEDURES THAT REQUIRE A SHARP SURGICAL BLADE TO PUNCTURE OR CUT.: Brand: BARD-PARKER SAFETY BLADES SIZE 11, STERILE

## (undated) DEVICE — PVC URETHRAL CATHETER: Brand: DOVER

## (undated) DEVICE — DRAPE EQUIPMENT RF WAND

## (undated) DEVICE — GLOVE PI ULTRA TOUCH SZ.6.5

## (undated) DEVICE — BETHLEHEM UNIVERSAL MINOR VAG: Brand: CARDINAL HEALTH

## (undated) DEVICE — UNDER BUTTOCKS DRAPE W/FLUID CONTROL POUCH: Brand: CONVERTORS

## (undated) DEVICE — [HIGH FLOW INSUFFLATOR,  DO NOT USE IF PACKAGE IS DAMAGED,  KEEP DRY,  KEEP AWAY FROM SUNLIGHT,  PROTECT FROM HEAT AND RADIOACTIVE SOURCES.]: Brand: PNEUMOSURE

## (undated) DEVICE — METZENBAUM ADTEC SINGLE USE DISSECTING SCISSORS, SHAFT ONLY, MONOPOLAR, CURVED TO LEFT, WORKING LENGTH: 12 1/4", (310 MM), DIAM. 5 MM, INSULATED, DOUBLE ACTION, STERILE, DISPOSABLE, PACKAGE OF 10 PIECES: Brand: AESCULAP

## (undated) DEVICE — ENDOPATH 5MM CURVED SCISSORS WITH MONOPOLAR CAUTERY: Brand: ENDOPATH

## (undated) DEVICE — TROCAR: Brand: KII FIOS FIRST ENTRY

## (undated) DEVICE — TROCAR: Brand: KII® SLEEVE

## (undated) DEVICE — ADHESIVE SKIN HIGH VISCOSITY EXOFIN 1ML

## (undated) DEVICE — 5 MM CURVED DISSECTORS WITH MONOPOLAR CAUTERY: Brand: ENDOPATH

## (undated) DEVICE — ADHESIVE SKN CLSR HISTOACRYL FLEX 0.5ML LF

## (undated) DEVICE — SPATULA SURG PKS PLASMA 33CM ANGLED

## (undated) DEVICE — CYSTO TUBING SINGLE IRRIGATION

## (undated) DEVICE — TUBING INSUFFLATION SET ISO CONNECTOR

## (undated) DEVICE — LIGHT GLOVE GREEN

## (undated) DEVICE — LIGHT HANDLE COVER SLEEVE DISP BLUE STELLAR

## (undated) DEVICE — 1820 FOAM BLOCK NEEDLE COUNTER: Brand: DEVON

## (undated) DEVICE — CHLORAPREP HI-LITE 26ML ORANGE

## (undated) DEVICE — Device: Brand: OMNICLOSE TROCAR SITE CLOSURE DEVICE

## (undated) DEVICE — STERILE SURGICAL LUBRICANT,  TUBE: Brand: SURGILUBE

## (undated) DEVICE — ENDOPATH PNEUMONEEDLE INSUFFLATION NEEDLES WITH LUER LOCK CONNECTORS 120MM: Brand: ENDOPATH

## (undated) DEVICE — FORCEPS PK CUTTING 5MM 33CM

## (undated) DEVICE — MYOSURE SEAL SET

## (undated) DEVICE — BLUE HEAT SCOPE WARMER

## (undated) DEVICE — TOWEL SET X-RAY

## (undated) DEVICE — MAYO STAND COVER: Brand: CONVERTORS

## (undated) DEVICE — BETHLEHEM UNIVERSAL GYN LAP PK: Brand: CARDINAL HEALTH

## (undated) DEVICE — CHLORHEXIDINE 4PCT 4 OZ

## (undated) DEVICE — SYRINGE 50ML LL

## (undated) DEVICE — ENSEAL X1 TISSUE SEALER, CURVED JAW, 37 CM SHAFT LENGTH: Brand: ENSEAL

## (undated) DEVICE — STERILE 8 INCH PROCTO SWAB: Brand: CARDINAL HEALTH

## (undated) DEVICE — GLOVE SRG BIOGEL 6.5

## (undated) DEVICE — INSUFFLATION NEEDLE TO ESTABLISH PNEUMOPERITONEUM.: Brand: INSUFFLATION NEEDLE

## (undated) DEVICE — DEVICE MYOSURE LITE TISSUE REMOVAL HYSTEROSCOPIC

## (undated) DEVICE — ANTI-FOG SOLUTION WITH FOAM PAD: Brand: DEVON

## (undated) DEVICE — 3M™ IOBAN™ 2 ANTIMICROBIAL INCISE DRAPE 6650EZ: Brand: IOBAN™ 2

## (undated) DEVICE — HEAVY DUTY TABLE COVER: Brand: CONVERTORS

## (undated) DEVICE — EXOFIN PRECISION PEN HIGH VISCOSITY TOPICAL SKIN ADHESIVE: Brand: EXOFIN PRECISION PEN, 1G

## (undated) DEVICE — SPONGE 4 X 4 XRAY 16 PLY STRL LF RFD

## (undated) DEVICE — SYRINGE 10ML LL

## (undated) DEVICE — VIAL DECANTER

## (undated) DEVICE — ENDOPOUCH RETRIEVER SPECIMEN RETRIEVAL BAGS: Brand: ENDOPOUCH RETRIEVER

## (undated) DEVICE — UTERINE MANIP 4.5MM